# Patient Record
Sex: MALE | Race: WHITE | Employment: FULL TIME | URBAN - METROPOLITAN AREA
[De-identification: names, ages, dates, MRNs, and addresses within clinical notes are randomized per-mention and may not be internally consistent; named-entity substitution may affect disease eponyms.]

---

## 2020-06-20 ENCOUNTER — HOSPITAL ENCOUNTER (EMERGENCY)
Age: 45
Discharge: HOME | End: 2020-06-20
Attending: EMERGENCY MEDICINE
Payer: MEDICARE

## 2020-06-20 VITALS
TEMPERATURE: 98.5 F | BODY MASS INDEX: 32.07 KG/M2 | HEART RATE: 92 BPM | SYSTOLIC BLOOD PRESSURE: 129 MMHG | HEIGHT: 73 IN | DIASTOLIC BLOOD PRESSURE: 87 MMHG | RESPIRATION RATE: 17 BRPM | WEIGHT: 242 LBS | OXYGEN SATURATION: 92 %

## 2020-06-20 LAB
AMORPHOUS: ABNORMAL /HPF
ANION GAP SERPL CALCULATED.3IONS-SCNC: 15 MMOL/L (ref 3–16)
BACTERIA: ABNORMAL /HPF
BASOPHILS ABSOLUTE: 0 K/UL (ref 0–0.2)
BASOPHILS RELATIVE PERCENT: 0.3 %
BILIRUBIN URINE: NEGATIVE
BLOOD, URINE: ABNORMAL
BUN BLDV-MCNC: 16 MG/DL (ref 7–20)
CALCIUM SERPL-MCNC: 10.3 MG/DL (ref 8.3–10.6)
CHLORIDE BLD-SCNC: 98 MMOL/L (ref 99–110)
CLARITY: CLEAR
CO2: 22 MMOL/L (ref 21–32)
COLOR: YELLOW
CREAT SERPL-MCNC: 1 MG/DL (ref 0.9–1.3)
EKG ATRIAL RATE: 132 BPM
EKG DIAGNOSIS: NORMAL
EKG P AXIS: 9 DEGREES
EKG P-R INTERVAL: 156 MS
EKG Q-T INTERVAL: 294 MS
EKG QRS DURATION: 92 MS
EKG QTC CALCULATION (BAZETT): 435 MS
EKG R AXIS: 16 DEGREES
EKG T AXIS: 57 DEGREES
EKG VENTRICULAR RATE: 132 BPM
EOSINOPHILS ABSOLUTE: 0 K/UL (ref 0–0.6)
EOSINOPHILS RELATIVE PERCENT: 0.4 %
EPITHELIAL CELLS, UA: ABNORMAL /HPF (ref 0–5)
ETHANOL: NORMAL MG/DL (ref 0–0.08)
GFR AFRICAN AMERICAN: >60
GFR NON-AFRICAN AMERICAN: >60
GLUCOSE BLD-MCNC: 125 MG/DL (ref 70–99)
GLUCOSE URINE: NEGATIVE MG/DL
HCT VFR BLD CALC: 42.9 % (ref 40.5–52.5)
HEMOGLOBIN: 14.7 G/DL (ref 13.5–17.5)
KETONES, URINE: NEGATIVE MG/DL
LEUKOCYTE ESTERASE, URINE: NEGATIVE
LYMPHOCYTES ABSOLUTE: 1.1 K/UL (ref 1–5.1)
LYMPHOCYTES RELATIVE PERCENT: 11 %
MCH RBC QN AUTO: 31 PG (ref 26–34)
MCHC RBC AUTO-ENTMCNC: 34.2 G/DL (ref 31–36)
MCV RBC AUTO: 90.4 FL (ref 80–100)
MICROSCOPIC EXAMINATION: YES
MONOCYTES ABSOLUTE: 0.7 K/UL (ref 0–1.3)
MONOCYTES RELATIVE PERCENT: 7 %
MUCUS: ABNORMAL /LPF
NEUTROPHILS ABSOLUTE: 8.2 K/UL (ref 1.7–7.7)
NEUTROPHILS RELATIVE PERCENT: 81.3 %
NITRITE, URINE: NEGATIVE
PDW BLD-RTO: 13.2 % (ref 12.4–15.4)
PH UA: 6 (ref 5–8)
PLATELET # BLD: 266 K/UL (ref 135–450)
PMV BLD AUTO: 8.7 FL (ref 5–10.5)
POTASSIUM REFLEX MAGNESIUM: 4.5 MMOL/L (ref 3.5–5.1)
PROTEIN UA: 100 MG/DL
RBC # BLD: 4.75 M/UL (ref 4.2–5.9)
RBC UA: ABNORMAL /HPF (ref 0–4)
SODIUM BLD-SCNC: 135 MMOL/L (ref 136–145)
SPECIFIC GRAVITY UA: >=1.03 (ref 1–1.03)
URINE TYPE: ABNORMAL
UROBILINOGEN, URINE: 0.2 E.U./DL
WBC # BLD: 10.1 K/UL (ref 4–11)
WBC UA: ABNORMAL /HPF (ref 0–5)

## 2020-06-20 PROCEDURE — 93005 ELECTROCARDIOGRAM TRACING: CPT | Performed by: PHYSICIAN ASSISTANT

## 2020-06-20 PROCEDURE — G0480 DRUG TEST DEF 1-7 CLASSES: HCPCS

## 2020-06-20 PROCEDURE — 99284 EMERGENCY DEPT VISIT MOD MDM: CPT

## 2020-06-20 PROCEDURE — 85025 COMPLETE CBC W/AUTO DIFF WBC: CPT

## 2020-06-20 PROCEDURE — 80048 BASIC METABOLIC PNL TOTAL CA: CPT

## 2020-06-20 PROCEDURE — 6370000000 HC RX 637 (ALT 250 FOR IP): Performed by: PHYSICIAN ASSISTANT

## 2020-06-20 PROCEDURE — 81001 URINALYSIS AUTO W/SCOPE: CPT

## 2020-06-20 PROCEDURE — 2580000003 HC RX 258: Performed by: PHYSICIAN ASSISTANT

## 2020-06-20 RX ORDER — 0.9 % SODIUM CHLORIDE 0.9 %
1000 INTRAVENOUS SOLUTION INTRAVENOUS ONCE
Status: COMPLETED | OUTPATIENT
Start: 2020-06-20 | End: 2020-06-20

## 2020-06-20 RX ORDER — LEVETIRACETAM 500 MG/1
500 TABLET ORAL 2 TIMES DAILY
Qty: 60 TABLET | Refills: 0 | Status: SHIPPED | OUTPATIENT
Start: 2020-06-20 | End: 2022-02-06 | Stop reason: ALTCHOICE

## 2020-06-20 RX ORDER — LEVETIRACETAM 250 MG/1
1000 TABLET ORAL ONCE
Status: COMPLETED | OUTPATIENT
Start: 2020-06-20 | End: 2020-06-20

## 2020-06-20 RX ADMIN — LEVETIRACETAM 1000 MG: 250 TABLET ORAL at 10:46

## 2020-06-20 RX ADMIN — SODIUM CHLORIDE 1000 ML: 9 INJECTION, SOLUTION INTRAVENOUS at 10:47

## 2020-06-20 ASSESSMENT — ENCOUNTER SYMPTOMS
VOMITING: 0
DIARRHEA: 0
SHORTNESS OF BREATH: 0
ABDOMINAL PAIN: 0
GASTROINTESTINAL NEGATIVE: 1
RESPIRATORY NEGATIVE: 1

## 2020-06-20 NOTE — ED PROVIDER NOTES
[address] [facility]          PHYSICIAN ASSISTANT NOTE       Date of evaluation: 6/20/2020    Chief Complaint     Seizures      History of Present Illness     Monika Patel is a 54 y.o. male who presents seizure. Patient arrives via EMS for a reported seizure at work. Patient reports he does have a history of seizures and ran out of his keppra last week because he missed his appointment. EMS reports patient was post-ictal at the scene. Patient complains of a mild headache which is typical for him after a seizure. Patient denies any other pain. No recent illness, fever, cough, shortness of breath, vomiting, diarrhea. Otherwise well. Review of Systems     Review of Systems   Constitutional: Negative. Negative for fever. Respiratory: Negative. Negative for shortness of breath. Cardiovascular: Negative. Negative for chest pain. Gastrointestinal: Negative. Negative for abdominal pain, diarrhea and vomiting. Musculoskeletal: Negative. Negative for myalgias, neck pain and neck stiffness. Skin: Negative. Neurological: Positive for seizures. Negative for facial asymmetry, speech difficulty and weakness. All other systems reviewed and are negative. Past Medical, Surgical, Family, and Social History     He has a past medical history of Hypertension, Obesity, and Seizures ([facility] [address] He has a past surgical history that includes fracture surgery. His family history includes Other in his mother; Stroke in his father. He reports that he has quit smoking. He has never used smokeless tobacco. He reports current alcohol use. He reports that he does not use drugs.     Medications     Previous Medications    ATORVASTATIN (LIPITOR) 10 MG TABLET    Po daily    CHLORTHALIDONE (HYGROTEN) 50 MG TABLET    Po daily    LISINOPRIL (PRINIVIL;ZESTRIL) 40 MG TABLET    TAKE 1 TABLET BY MOUTH EVERY DAY    LORAZEPAM (ATIVAN) 0.5 MG TABLET    Take 1 tablet by mouth every 8 hours as needed during the hospital encounter of 06/20/20   CBC Auto Differential   Result Value Ref Range    WBC 10.1 4.0 - 11.0 K/uL    RBC 4.75 4.20 - 5.90 M/uL    Hemoglobin 14.7 13.5 - 17.5 g/dL    Hematocrit 42.9 40.5 - 52.5 %    MCV 90.4 80.0 - 100.0 fL    MCH 31.0 26.0 - 34.0 pg    MCHC 34.2 31.0 - 36.0 g/dL    RDW 13.2 12.4 - 15.4 %    Platelets 429 134 - 990 K/uL    MPV 8.7 5.0 - 10.5 fL    Neutrophils % 81.3 %    Lymphocytes % 11.0 %    Monocytes % 7.0 %    Eosinophils % 0.4 %    Basophils % 0.3 %    Neutrophils Absolute 8.2 (H) 1.7 - 7.7 K/uL    Lymphocytes Absolute 1.1 1.0 - 5.1 K/uL    Monocytes Absolute 0.7 0.0 - 1.3 K/uL    Eosinophils Absolute 0.0 0.0 - 0.6 K/uL    Basophils Absolute 0.0 0.0 - 0.2 K/uL   Basic Metabolic Panel w/ Reflex to MG   Result Value Ref Range    Sodium 135 (L) 136 - 145 mmol/L    Potassium reflex Magnesium 4.5 3.5 - 5.1 mmol/L    Chloride 98 (L) 99 - 110 mmol/L    CO2 22 21 - 32 mmol/L    Anion Gap 15 3 - 16    Glucose 125 (H) 70 - 99 mg/dL    BUN 16 7 - 20 mg/dL    CREATININE 1.0 0.9 - 1.3 mg/dL    GFR Non-African American >60 >60    GFR African American >60 >60    Calcium 10.3 8.3 - 10.6 mg/dL   Urinalysis, reflex to microscopic   Result Value Ref Range    Color, UA Yellow Straw/Yellow    Clarity, UA Clear Clear    Glucose, Ur Negative Negative mg/dL    Bilirubin Urine Negative Negative    Ketones, Urine Negative Negative mg/dL    Specific Gravity, UA >=1.030 1.005 - 1.030    Blood, Urine TRACE-INTACT (A) Negative    pH, UA 6.0 5.0 - 8.0    Protein,  (A) Negative mg/dL    Urobilinogen, Urine 0.2 <2.0 E.U./dL    Nitrite, Urine Negative Negative    Leukocyte Esterase, Urine Negative Negative    Microscopic Examination YES     Urine Type NotGiven    Ethanol   Result Value Ref Range    Ethanol Lvl None Detected mg/dL   Microscopic Urinalysis   Result Value Ref Range    Mucus, UA Rare (A) None Seen /LPF    WBC, UA 0-2 0 - 5 /HPF    RBC, UA 0-2 0 - 4 /HPF    Epithelial Cells, UA 0-1 0 - 5 /HPF    Bacteria, UA Rare (A) None Seen /HPF    Amorphous, UA 1+ /HPF   EKG 12 Lead   Result Value Ref Range    Ventricular Rate 132 BPM    Atrial Rate 132 BPM    P-R Interval 156 ms    QRS Duration 92 ms    Q-T Interval 294 ms    QTc Calculation (Bazett) 435 ms    P Axis 9 degrees    R Axis 16 degrees    T Axis 57 degrees    Diagnosis       EKG performed in ER and to be interpreted by ER physician. Confirmed by MD, ER ([id number]),  Jose Miguel Guzman ([id number]) on 6/20/2020 10:58:23 AM       ED BEDSIDE ULTRASOUND:      RECENT VITALS:  BP: 129/87, Temp: 98.5 °F (36.9 °C), Pulse: 92, Resp: 17, SpO2: 92 %     Procedures         ED Course     Nursing Notes, Past Medical Hx,Past Surgical Hx, Social Hx, Allergies, and Family Hx were reviewed. The patient was given the following medications:  Orders Placed This Encounter   Medications    0.9 % sodium chloride bolus    levETIRAcetam (KEPPRA) tablet 1,000 mg    levETIRAcetam (KEPPRA) 500 MG tablet     Sig: Take 1 tablet by mouth 2 times daily     Dispense:  60 tablet     Refill:  0       CONSULTS:  None    MEDICAL DECISION MAKING / ASSESSMENT / Will Alexandra is a 54 y.o. male with seizure. Patient is well appearing and in no acute distress. Patient is tachycardia and hypertensive on arrival. Patient is alert and oriented with a non-focal neurologic exam. No meningismus. No recent illness. Seizure likely due to medication non-compliance. Patient given a loading dose of keppra in the ER. Patient is mildly hyponatremia, given fluids in the ER. Blood work otherwise reassuring. Urinalysis without infection. Patient's heart rate improved to 92 in the ER. Patient is stable, tolerating oral intake, ambulatory. Will discharge with keppra prescription. Follow-up Neurology. Return precautions discussed. This patient was also evaluated by the attending physician. All care plans were discussed and agreed upon. Clinical Impression     1.  Seizure ([address]        Disposition PATIENT REFERRED TO:  Lily Wong MD  [facility] [address]  [phone]    Schedule an appointment as soon as possible for a visit       [facility] Emergency Department  [address] [place] [address]  [phone]    As needed, If symptoms worsen      DISCHARGE MEDICATIONS:  New Prescriptions    LEVETIRACETAM (KEPPRA) 500 MG TABLET    Take 1 tablet by mouth 2 times daily       DISPOSITION Decision To Discharge 06/20/2020 11:34:53 AM        Fabian Nava PA-C  06/20/20 2056

## 2020-06-20 NOTE — ED NOTES
Discharge order received. Patient being DC home with family. All paperwork explained to patient. He verbalizes understanding and denies any questions. IV removed. All belongings sent with patient.       Cassandra Odonnell RN  06/20/20 1666

## 2020-06-20 NOTE — ED TRIAGE NOTES
Pt states that he missed a doctor appointment last week and ran out of his keppra yesterday. Takes Keppra 500 mg BID. Pt was at work and had a seizure, fell from standing position. C/o head pain. EMS states that he was post ictal on arrival, is now A&O x4    Seizure precautions in place. Side rails padded on both sides of stretcher. Oxygen and suction available at head of bed. Will continue seizure precautions for patient safety.

## 2020-07-12 ENCOUNTER — HOSPITAL ENCOUNTER (EMERGENCY)
Age: 45
Discharge: HOME | End: 2020-07-12
Attending: STUDENT IN AN ORGANIZED HEALTH CARE EDUCATION/TRAINING PROGRAM
Payer: MEDICARE

## 2020-07-12 ENCOUNTER — APPOINTMENT (OUTPATIENT)
Dept: GENERAL RADIOLOGY | Age: 45
End: 2020-07-12
Payer: MEDICARE

## 2020-07-12 VITALS
TEMPERATURE: 99.5 F | HEART RATE: 86 BPM | RESPIRATION RATE: 14 BRPM | OXYGEN SATURATION: 96 % | DIASTOLIC BLOOD PRESSURE: 94 MMHG | SYSTOLIC BLOOD PRESSURE: 148 MMHG

## 2020-07-12 PROCEDURE — 99282 EMERGENCY DEPT VISIT SF MDM: CPT

## 2020-07-12 PROCEDURE — 73130 X-RAY EXAM OF HAND: CPT

## 2020-07-12 PROCEDURE — 12002 RPR S/N/AX/GEN/TRNK2.6-7.5CM: CPT

## 2020-07-12 ASSESSMENT — PAIN SCALES - GENERAL: PAINLEVEL_OUTOF10: 9

## 2020-07-12 ASSESSMENT — PAIN DESCRIPTION - ORIENTATION: ORIENTATION: RIGHT

## 2020-07-12 ASSESSMENT — PAIN DESCRIPTION - PROGRESSION: CLINICAL_PROGRESSION: GRADUALLY WORSENING

## 2020-07-12 ASSESSMENT — PAIN DESCRIPTION - ONSET: ONSET: SUDDEN

## 2020-07-12 ASSESSMENT — PAIN DESCRIPTION - FREQUENCY: FREQUENCY: CONTINUOUS

## 2020-07-12 ASSESSMENT — PAIN DESCRIPTION - PAIN TYPE: TYPE: ACUTE PAIN

## 2020-07-12 ASSESSMENT — PAIN DESCRIPTION - LOCATION: LOCATION: HAND

## 2020-07-12 ASSESSMENT — PAIN DESCRIPTION - DESCRIPTORS: DESCRIPTORS: SORE

## 2020-07-12 NOTE — ED TRIAGE NOTES
Laceration to right middle knuckle he was reaching into his  yesterday and cut his knuckle, bleeding is controlled however when he bends is knuckle is opens the wound back up

## 2020-07-12 NOTE — ED NOTES
Pt d/c home with avs no s/s of distress noted he denies questions      Dennise Coello, RN  07/12/20 2858

## 2020-07-12 NOTE — ED PROVIDER NOTES
need for additional repair and nerve damage    Alternatives discussed:  No treatment  Anesthesia (see MAR for exact dosages): Anesthesia method:  Local infiltration    Local anesthetic:  Lidocaine 1% WITH epi  Laceration details:     Location:  Hand    Hand location:  R hand, dorsum    Wound length (cm): 4cm. Laceration depth: 1cm. Repair type:     Repair type:  Simple  Pre-procedure details:     Preparation:  Patient was prepped and draped in usual sterile fashion  Exploration:     Hemostasis achieved with:  Epinephrine  Treatment:     Area cleansed with:  Saline    Amount of cleaning:  Standard    Irrigation solution:  Sterile saline    Visualized foreign bodies/material removed: no    Skin repair:     Repair method:  Sutures    Suture size:  4-0    Suture material:  Prolene    Suture technique:  Simple interrupted  Approximation:     Approximation:  Close  Post-procedure details:     Dressing:  Antibiotic ointment and adhesive bandage    Patient tolerance of procedure: Tolerated well, no immediate complications        ASSESSMENT AND PLAN:  Yovana Zuniga is a 54 y.o. male presents this afternoon accompanied by wife with a head lac. On exam nontoxic in no acute distress and lack was repaired using 4-0 Prolene with 7 sutures. States that he is up-to-date with his tetanus shots and no indication for tetanus. He was discharged home to follow-up with PCP or return to the emergency room in 10 to 14 days for suture removal.     ClINICAL IMPRESSION:  1.  Laceration of right hand without foreign body, initial encounter          PATIENT REFERRED TO:  Annelise Roger MD  [facility] [address]  [phone]    Schedule an appointment as soon as possible for a visit in 2 days        DISCHARGE MEDICATIONS:  Discharge Medication List as of 7/12/2020  1:14 PM        DISCONTINUED MEDICATIONS:  Discharge Medication List as of 7/12/2020  1:14 PM        DISPOSITION Decision To Discharge 07/12/2020 01:12:51 PM  -We have instructed the patient, Sandeep Helton) to return to the ED or call him PCP if him pain/symptoms worsen. -Findings and recommendations explained to patient. He expressed understanding and agreed with the plan. Adenike Navarro MD (electronically signed)  7/13/2020  _________________________________________________________________________________________  _________________________________________________________________________________________  This record is transcribed utilizing voice recognition technology. There are inherent limitations in this technology. In addition, there may be limitations in editing of this report. If there are any discrepancies, please contact me directly.         Adenike Navarro MD  07/13/20 0710

## 2020-07-19 ENCOUNTER — HOSPITAL ENCOUNTER (EMERGENCY)
Age: 45
Discharge: HOME | End: 2020-07-19
Attending: EMERGENCY MEDICINE
Payer: MEDICARE

## 2020-07-19 PROCEDURE — 99281 EMR DPT VST MAYX REQ PHY/QHP: CPT

## 2020-07-19 RX ORDER — CEPHALEXIN 500 MG/1
500 CAPSULE ORAL 4 TIMES DAILY
Qty: 40 CAPSULE | Refills: 0 | Status: SHIPPED | OUTPATIENT
Start: 2020-07-19 | End: 2020-07-29

## 2020-07-19 ASSESSMENT — PAIN SCALES - GENERAL: PAINLEVEL_OUTOF10: 6

## 2020-07-19 ASSESSMENT — PAIN DESCRIPTION - DESCRIPTORS: DESCRIPTORS: SORE

## 2020-07-19 ASSESSMENT — PAIN DESCRIPTION - PAIN TYPE: TYPE: ACUTE PAIN

## 2020-07-19 ASSESSMENT — PAIN DESCRIPTION - LOCATION: LOCATION: HAND

## 2020-07-19 ASSESSMENT — PAIN DESCRIPTION - ORIENTATION: ORIENTATION: RIGHT

## 2020-07-19 NOTE — ED NOTES
Pt wearing mask from home. Staff wearing procedural mask and eye protection (helmet or goggles) for staff protection-COVID 19      pt seen here last sunday and received sutures top of right hand (hand cut on edge of ). developed redness inpast 2 days with some clear drainage today. laceration fairly well approximated but has some yellowish-white tissue at laceration line. pain at 6.  no OTC pain meds taken. pt concerned that it may be infected.          Daryl Hdz, KATIE  07/19/20 1809

## 2020-07-19 NOTE — ED PROVIDER NOTES
eMERGENCY dEPARTMENT eNCOUnter      Pt Name: Neftali Shrestha  MRN: [id number]  [place] 1975  Date of evaluation: 7/19/2020  Provider: Kevin Peña MD     [address] [place] [address]       Chief Complaint   Patient presents with    Suture / Staple Removal     pt seen here last sunday and received sutures top of right hand (hand cut on edge of ). developed redness inpast 2 days with some clear drainage today. laceration fairly well approximated but has some yellowish-white tissue at laceration line. pain at 6.  no OTC pain meds taken. pt concerned that it may be infected.  Wound Infection         HISTORY OF PRESENT ILLNESS   (Location/Symptom, Timing/Onset,Context/Setting, Quality, Duration, Modifying Factors, Severity) Note limiting factors. HPI    Neftali Shrestha is a 54 y.o. male who presents to the emergency department for suture removal.  Patient was seen a week ago for laceration of the right hand specifically the knuckle at the MCP joint third finger. Patient received 6 sutures. Patient now thinks is infected is red warm to touch. Swollen. Patient's sutures has been there for at least 7 days. There is no fevers no chills    Nursing Notes were reviewed. REVIEW OFSYSTEMS    (2+ for level 4; 10+ for level 5)   Review of Systems    General: No fevers, chills or night sweats, No weight loss    Head:  No Sore throat,  No Ear Pain    Chest:  Nontender. No Cough, No SOB,  Chest Pain    GI: No abdominal pain or vomiting    : No dysuria or hematuria    Musculoskeletal: No unrelenting pain or night pain    Neurologic: No bowel or bladder incontinence, No saddle anesthesia, No leg weakness    All other systems reviewed and are negative.         PAST MEDICAL HISTORY     Past Medical History:   Diagnosis Date    Hyperlipidemia     Hypertension     Obesity     Seizures ([facility] [address]        SURGICAL HISTORY       Past Surgical History:   Procedure Laterality Date    FRACTURE SURGERY         CURRENT MEDICATIONS       Previous Medications    ATORVASTATIN (LIPITOR) 10 MG TABLET    Po daily    LEVETIRACETAM (KEPPRA) 500 MG TABLET    Take 1 tablet by mouth 2 times daily    LISINOPRIL (PRINIVIL;ZESTRIL) 40 MG TABLET    TAKE 1 TABLET BY MOUTH EVERY DAY    MULTIVITAMIN (THERAGRAN) PER TABLET    Take 1 tablet by mouth daily. ALLERGIES     Patient has no known allergies.     FAMILY HISTORY       Family History   Problem Relation Age of Onset    Other Mother     Stroke Father         SOCIAL HISTORY       Social History     Socioeconomic History    Marital status:      Spouse name: None    Number of children: None    Years of education: None    Highest education level: None   Occupational History    None   Social Needs    Financial resource strain: None    Food insecurity     Worry: None     Inability: None    Transportation needs     Medical: None     Non-medical: None   Tobacco Use    Smoking status: Former Smoker    Smokeless tobacco: Never Used   Substance and Sexual Activity    Alcohol use: Yes     Comment: 4 times a week    Drug use: No    Sexual activity: Not Currently     Partners: Female   Lifestyle    Physical activity     Days per week: None     Minutes per session: None    Stress: None   Relationships    Social connections     Talks on phone: None     Gets together: None     Attends [employer] service: None     Active member of club or organization: None     Attends meetings of clubs or organizations: None     Relationship status: None    Intimate partner violence     Fear of current or ex partner: None     Emotionally abused: None     Physically abused: None     Forced sexual activity: None   Other Topics Concern    None   Social History Narrative    None       SCREENINGS           PHYSICAL EXAM    (up to 7 for level 4, 8 or more for level 5)     ED Triage Vitals [07/19/20 1419]   BP Temp Temp Source Pulse Resp SpO2 Height Weight   128/83 98.1 °F (36.7 °C) Oral 100 20 95 % 6' 1\" (1.854 m) 241 lb 10 oz (109.6 kg)       Physical Exam    General: Alert and awake ×3. Nontoxic appearance. Well-developed well-nourished 58-year-old white male in no distress  HEENT: Normocephalic atraumatic. Neck is supple. Airway intact. No adenopathy  Cardiac: Regular rate and rhythm with no murmurs rubs or gallops  Pulmonary: Lungs are clear in all lung fields. No wheezing. No Rales. Abdomen: Soft and nontender. Negative hepatosplenomegaly. Bowel sounds are active  Extremities: Moving all extremities. No calf tenderness. Peripheral pulses all intact. Patient right hand has 6 sutures well-healed with moderate swelling and redness. That is consistent for a cellulitis his hands warm with a little rash. Sutures will need to be removed. There is no drainage at this time. Flexion extension is fine but moderate swelling over the joint skin: No skin lesions. No rashes  Neurologic: Cranial nerves II through XII was grossly intact. Nonfocal neurological exam  Psychiatric: Patient is pleasant. Mood is appropriate. DIAGNOSTIC RESULTS     EKG (Per Emergency Physician):       RADIOLOGY (Per Emergency Physician): Interpretation per the Radiologist below, if available at the time of this note:  No results found. ED BEDSIDE ULTRASOUND:   Performed by ED Physician - none    LABS:  Labs Reviewed - No data to display     All other labs were within normal range or not returned as of this dictation. Procedures    Suture removal without any difficulty. A total of 6 was removed. There is still some redness. No pus noted. EMERGENCY DEPARTMENT COURSE and DIFFERENTIAL DIAGNOSIS/MDM:   Vitals:    Vitals:    07/19/20 1419   BP: 128/83   Pulse: 100   Resp: 20   Temp: 98.1 °F (36.7 °C)   TempSrc: Oral   SpO2: 95%   Weight: 241 lb 10 oz (109.6 kg)   Height: 6' 1\" (1.854 m)       Medications - No data to display    MDM.     This is a 58-year-old sustained a laceration of the right hand 7 days ago requiring sutures. Patient is here for suture removal.  It was removed no foreign body noted there is redness. Suspect some infection placed on Keflex patient will follow-up with family physician discharged in good improved condition  REVAL:         CRITICAL CARE TIME   Total CriticalCare time was 0 minutes, excluding separately reportable procedures. There was a high probability of clinically significant/life threatening deterioration in the patient's condition which required my urgent intervention. CONSULTS:  None    PROCEDURES:  Unless otherwise noted below, none     [unfilled]    FINAL IMPRESSION      1. Encounter for removal of sutures    2. Cellulitis of finger of right hand          DISPOSITION/PLAN   DISPOSITION Decision To Discharge 07/19/2020 02:39:01 PM      PATIENT REFERRED TO:  Lori Marks MD  [facility] [address]  [phone]    Schedule an appointment as soon as possible for a visit in 1 week  As needed      DISCHARGE MEDICATIONS:  New Prescriptions    CEPHALEXIN (KEFLEX) 500 MG CAPSULE    Take 1 capsule by mouth 4 times daily for 10 days          (Please note:  Portions of this note were completed with a voice recognition program.Efforts were made to edit the dictations but occasionally words and phrases are mis-transcribed.)  Form v2016. J.[id number]    DEMETRA PETTY MD (electronically signed)  Emergency Medicine Provider        Darryn Fields MD  07/19/20 7974

## 2020-07-23 VITALS
HEIGHT: 73 IN | SYSTOLIC BLOOD PRESSURE: 119 MMHG | BODY MASS INDEX: 32.02 KG/M2 | TEMPERATURE: 98.1 F | RESPIRATION RATE: 20 BRPM | WEIGHT: 241.62 LBS | HEART RATE: 88 BPM | OXYGEN SATURATION: 95 % | DIASTOLIC BLOOD PRESSURE: 82 MMHG

## 2020-07-24 NOTE — ED NOTES
EMD removed sutures. EMD states pt got a little diaphoretic when sutures removed. No steri strips to lac line at EMD's request.    Home wound care teaching with pt. Pt no longer diaphoretic. Denies dizziness.    VSS   Pain to area at [address] [place] [address]  07/23/20 8298

## 2020-07-24 NOTE — ED NOTES
Discharge instructions with pt. Home wound care teaching again with pt. Explained rx. Pain to area at 6.    Home ambulatory     Bucky Fitzgerald RN  07/23/20 9445

## 2020-11-03 ENCOUNTER — HOSPITAL ENCOUNTER (EMERGENCY)
Age: 45
Discharge: HOME | End: 2020-11-03
Payer: MEDICARE

## 2020-11-03 VITALS
SYSTOLIC BLOOD PRESSURE: 140 MMHG | TEMPERATURE: 97.4 F | RESPIRATION RATE: 16 BRPM | DIASTOLIC BLOOD PRESSURE: 91 MMHG | OXYGEN SATURATION: 96 % | HEIGHT: 73 IN | HEART RATE: 95 BPM | BODY MASS INDEX: 31.96 KG/M2 | WEIGHT: 241.18 LBS

## 2020-11-03 PROCEDURE — 6370000000 HC RX 637 (ALT 250 FOR IP): Performed by: NURSE PRACTITIONER

## 2020-11-03 PROCEDURE — 99282 EMERGENCY DEPT VISIT SF MDM: CPT

## 2020-11-03 PROCEDURE — 16000 INITIAL TREATMENT OF BURN(S): CPT

## 2020-11-03 RX ORDER — CLINDAMYCIN HYDROCHLORIDE 150 MG/1
450 CAPSULE ORAL ONCE
Status: COMPLETED | OUTPATIENT
Start: 2020-11-03 | End: 2020-11-03

## 2020-11-03 RX ORDER — CLINDAMYCIN HYDROCHLORIDE 150 MG/1
450 CAPSULE ORAL 3 TIMES DAILY
Qty: 90 CAPSULE | Refills: 0 | Status: SHIPPED | OUTPATIENT
Start: 2020-11-03 | End: 2020-11-13

## 2020-11-03 RX ORDER — MUPIROCIN CALCIUM 20 MG/G
CREAM TOPICAL
Qty: 2 TUBE | Refills: 0 | Status: SHIPPED | OUTPATIENT
Start: 2020-11-03 | End: 2020-12-03

## 2020-11-03 RX ORDER — CLINDAMYCIN HYDROCHLORIDE 150 MG/1
300 CAPSULE ORAL ONCE
Status: DISCONTINUED | OUTPATIENT
Start: 2020-11-03 | End: 2020-11-03

## 2020-11-03 RX ORDER — BACITRACIN ZINC AND POLYMYXIN B SULFATE 500; 1000 [USP'U]/G; [USP'U]/G
OINTMENT TOPICAL 2 TIMES DAILY
Status: DISCONTINUED | OUTPATIENT
Start: 2020-11-03 | End: 2020-11-04 | Stop reason: HOSPADM

## 2020-11-03 RX ADMIN — FIRST AID ANTIBIOTIC OINTMENT: 500; 10000 OINTMENT TOPICAL at 21:13

## 2020-11-03 RX ADMIN — CLINDAMYCIN HYDROCHLORIDE 450 MG: 150 CAPSULE ORAL at 20:55

## 2020-11-04 NOTE — ED PROVIDER NOTES
[address] [address] [address] [facility] [address]  Dept: [phone]  Loc: [address] ENCOUNTER        This patient was not seen or evaluated by the attending physician. I evaluated this patient, the attending physician was available for consultation. CHIEF COMPLAINT    Chief Complaint   Patient presents with    Burn     Pt burned hand with grease on Sat. wears gloves for work which aggravated the area       HPI    Radha Tom is a 54 y.o. male who presents with a skin burn. Onset was days ago. The duration has been constant since the onset. The location is the dorsal aspect of the right hand. The patient has associated pain, the severity of which is 8/10. The context is he was cooking fish and all of oil, slipped the fish and some the all of oil splattered up onto his right hand. No palmar burns. He states that he is up-to-date on his tetanus within the past 10 years. Did not go to be seen or evaluated after the burn occurred. States that he works at [employer], and has to wear gloves at work, noticed that the irritation and redness increased after wearing his gloves over the past 2 days. Therefore came to the ED for further evaluation and treatment. No fevers or chills noted. Lucky Bound REVIEW OF SYSTEMS    Pulmonary: No difficulty breathing or chest tightness  Skin: see HPI  ENT: No throat swelling or tongue swelling  General: No fevers or syncope  Immunization: Tetanus status is up-to-date according to the patient, verified via care everywhere. Last tetanus was 2013. Does not need to be updated during this ED encounter. All other systems reviewed and are negative.     PAST MEDICAL & SURGICAL HISTORY    Past Medical History:   Diagnosis Date    Hyperlipidemia     Hypertension     Obesity     Seizures ([facility] [address]      Past Surgical History:   Procedure Laterality Date    FRACTURE SURGERY         CURRENT on file     Emotionally abused: Not on file     Physically abused: Not on file     Forced sexual activity: Not on file   Other Topics Concern    Not on file   Social History Narrative    Not on file     Family History   Problem Relation Age of Onset    Other Mother     Stroke Father        PHYSICAL EXAM    VITAL SIGNS: BP (!) 140/91   Pulse 95   Temp 97.4 °F (36.3 °C) (Oral)   Resp 16   Ht 6' 1\" (1.854 m)   Wt 241 lb 2.9 oz (109.4 kg)   SpO2 96%   BMI 31.82 kg/m²   Constitutional:  Well developed, well nourished, no acute distress   HENT:  Atraumatic, no facial or lip swelling  ORAL EXAM:  No tongue swelling, airway patent  NECK:  Supple, No neck swelling  Respiratory:  No respiratory distress, normal breath sounds   Cardiovascular:  regular rate and rhythm, no JVD. Right radial pulse 2+. Capillary refill less than 3 seconds. GI: nondistended, normal bowel sounds, nontender, no organomegaly   Musculoskeletal:  No edema, no acute deformities. Integument:  + Several areas, the largest of which is measuring 2 cm in diameter area of superficial desquamation on the dorsal aspect of the right hand, no weeping, erythema is surrounding these areas of desquamation, erythema is noted to be extending up to the right wrist region consistent with some possible early development of cellulitis clinically  Neurologic: Motor and sensory is intact and normal, patient is awake, alert, no slurred speech    RADIOLOGY   No orders to display       ED [address]    Pertinent Labs reviewed and interpreted. (See chart for details)  See chart for details of medications given during the ED stay. Differential diagnosis: partial thickness burn, full thickness burn, sepsis, compartment syndrome, cellulitis, other    Patient is afebrile and nontoxic in appearance.  Physical exam consistent with second-degree partial-thickness burn intermixed with some areas of first-degree burn on the dorsal aspect of the right hand. Burn cleansed thoroughly and antibiotic ointment and sterile gauze were applied. Given that the area of erythema has extended up into the right wrist region I will treat him with a course of clindamycin for concomitant cellulitis. He does have a primary care provider listed, he needs to follow-up with them in the next 48 hours for wound recheck. He is to return immediately for any new or worsening symptoms. He verbalized understanding, has no further questions or concerns. Remained afebrile and hemodynamically stable throughout his entire ED course and will be discharged home in stable condition. I estimate there is LOW risk for COMPARTMENT SYNDROME, NECROTIZING FASCIITIS, TENDON OR NEUROVASCULAR INJURY, or FOREIGN BODY, thus I consider the discharge disposition reasonable. Also, there is no evidence or peritonitis, sepsis, or toxicity. Xenia Mo and I have discussed the diagnosis and risks, and we agree with discharging home to follow-up with their primary doctor. We also discussed returning to the Emergency Department immediately if new or worsening symptoms occur. We have discussed the symptoms which are most concerning (e.g., changing or worsening pain, fever, numbness, weakness, cool or painful digits) that necessitate immediate return. Discharge Vital Signs:  Blood pressure (!) 140/91, pulse 95, temperature 97.4 °F (36.3 °C), temperature source Oral, resp. rate 16, height 6' 1\" (1.854 m), weight 241 lb 2.9 oz (109.4 kg), SpO2 96 %. The patient was instructed to follow up as an outpatient in 2 days for wound recheck. The patient was instructed to return to the ED immediately for any new or worsening symptoms. The patient verbalized understanding. FINAL IMPRESSION    1.  Burn of back of right hand, initial encounter        PLAN  Discharge with outpatient follow-up      (Please note that this note was completed with a voice recognition program.  Every attempt was made to edit the dictations, but inevitably there remain words that are mis-transcribed.)          Chetan Monterroso, SOSA - PATEL  11/03/20 2026

## 2022-02-06 ENCOUNTER — APPOINTMENT (OUTPATIENT)
Dept: GENERAL RADIOLOGY | Age: 47
End: 2022-02-06
Payer: COMMERCIAL

## 2022-02-06 ENCOUNTER — HOSPITAL ENCOUNTER (EMERGENCY)
Age: 47
Discharge: HOME | End: 2022-02-06
Payer: COMMERCIAL

## 2022-02-06 VITALS
RESPIRATION RATE: 18 BRPM | WEIGHT: 261.69 LBS | HEART RATE: 91 BPM | HEIGHT: 73 IN | OXYGEN SATURATION: 95 % | DIASTOLIC BLOOD PRESSURE: 90 MMHG | TEMPERATURE: 98.7 F | BODY MASS INDEX: 34.68 KG/M2 | SYSTOLIC BLOOD PRESSURE: 142 MMHG

## 2022-02-06 DIAGNOSIS — S51.012A LACERATION OF LEFT ELBOW, INITIAL ENCOUNTER: Primary | ICD-10-CM

## 2022-02-06 DIAGNOSIS — S60.222A CONTUSION OF LEFT HAND, INITIAL ENCOUNTER: ICD-10-CM
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
## 2022-02-06 PROCEDURE — 6370000000 HC RX 637 (ALT 250 FOR IP): Performed by: PHYSICIAN ASSISTANT

## 2022-02-06 PROCEDURE — 96365 THER/PROPH/DIAG IV INF INIT: CPT

## 2022-02-06 PROCEDURE — 73130 X-RAY EXAM OF HAND: CPT

## 2022-02-06 PROCEDURE — 73080 X-RAY EXAM OF ELBOW: CPT

## 2022-02-06 PROCEDURE — 6360000002 HC RX W HCPCS: Performed by: PHYSICIAN ASSISTANT

## 2022-02-06 PROCEDURE — 2580000003 HC RX 258: Performed by: PHYSICIAN ASSISTANT

## 2022-02-06 PROCEDURE — 12001 RPR S/N/AX/GEN/TRNK 2.5CM/<: CPT

## 2022-02-06 PROCEDURE — 99283 EMERGENCY DEPT VISIT LOW MDM: CPT

## 2022-02-06 RX ORDER — CEPHALEXIN 500 MG/1
500 CAPSULE ORAL 4 TIMES DAILY
Qty: 28 CAPSULE | Refills: 0 | Status: SHIPPED | OUTPATIENT
Start: 2022-02-06 | End: 2022-02-16

## 2022-02-06 RX ORDER — HYDROCODONE BITARTRATE AND ACETAMINOPHEN 5; 325 MG/1; MG/1
1 TABLET ORAL EVERY 8 HOURS PRN
Qty: 9 TABLET | Refills: 0 | Status: SHIPPED | OUTPATIENT
Start: 2022-02-06 | End: 2022-02-09

## 2022-02-06 RX ORDER — HYDROCODONE BITARTRATE AND ACETAMINOPHEN 5; 325 MG/1; MG/1
1 TABLET ORAL ONCE
Status: COMPLETED | OUTPATIENT
Start: 2022-02-06 | End: 2022-02-06

## 2022-02-06 RX ORDER — LEVETIRACETAM 750 MG/1
750 TABLET ORAL 2 TIMES DAILY
COMMUNITY

## 2022-02-06 RX ORDER — IBUPROFEN 800 MG/1
800 TABLET ORAL EVERY 8 HOURS PRN
Qty: 30 TABLET | Refills: 0 | Status: SHIPPED | OUTPATIENT
Start: 2022-02-06 | End: 2022-11-01

## 2022-02-06 RX ORDER — LIDOCAINE HYDROCHLORIDE AND EPINEPHRINE 10; 10 MG/ML; UG/ML
20 INJECTION, SOLUTION INFILTRATION; PERINEURAL ONCE
Status: DISCONTINUED | OUTPATIENT
Start: 2022-02-06 | End: 2022-02-07 | Stop reason: HOSPADM

## 2022-02-06 RX ADMIN — CEFAZOLIN SODIUM 2000 MG: 1 INJECTION, POWDER, FOR SOLUTION INTRAMUSCULAR; INTRAVENOUS at 22:07

## 2022-02-06 RX ADMIN — HYDROCODONE BITARTRATE AND ACETAMINOPHEN 1 TABLET: 5; 325 TABLET ORAL at 20:02

## 2022-02-06 ASSESSMENT — PAIN SCALES - GENERAL: PAINLEVEL_OUTOF10: 6

## 2022-02-06 NOTE — Clinical Note
Ynes See was seen and treated in our emergency department on 2/6/2022. He may return to work on 02/09/2022. If you have any questions or concerns, please don't hesitate to call.       Juan Jackson

## 2022-02-07 NOTE — ED PROVIDER NOTES
[address] [address] [address] [facility] [address]  Dept: [phone]  Loc: [address] ENCOUNTER        This patient was not seen or evaluated by the attending physician. I evaluated this patient, the attending physician was available for consultation. CHIEF COMPLAINT    Chief Complaint   Patient presents with    Laceration     L elbow laceration and abrasion, hit on side of house during fall at 1900 today.  Hand Pain     R hand hit on ground after slipping on ice at 1900 today. HPI    Toby Londono is a 51 y.o. male who presents with left elbow and right hand pain pain. The onset was about 1900 today. The duration has been constant since the onset. The quality of the pain is sharp. The patient has no other associated injury. The context is that he slipped on the ice and fell at his home prior to arrival. He denies hitting his head or losing consciousness. He has no further complaints at this time. REVIEW OF SYSTEMS    Skin: With abrasion and laceration to left elbow, no puncture wounds  Musculoskeletal: see HPI, no other joint or bony injury or pain  Neurologic: No loss of consciousness, no head injury, no paresthesias or focal distal extremity weakness  Immunization: Tetanus status current    All other systems reviewed and are negative.     PAST MEDICAL & SURGICAL HISTORY    Past Medical History:   Diagnosis Date    Hyperlipidemia     Hypertension     Obesity     Seizures ([facility] [address]      Past Surgical History:   Procedure Laterality Date    FRACTURE SURGERY         CURRENT MEDICATIONS  (may include discharge medications prescribed in the ED)  Current Outpatient Rx   Medication Sig Dispense Refill    cephALEXin (KEFLEX) 500 MG capsule Take 1 capsule by mouth 4 times daily for 10 days 28 capsule 0    HYDROcodone-acetaminophen (NORCO) 5-325 MG per tablet Take 1 tablet by mouth every 8 hours as needed for Pain for up to 3 days. Intended supply: 3 days. Take lowest dose possible to manage pain 9 tablet 0    ibuprofen (ADVIL;MOTRIN) 800 MG tablet Take 1 tablet by mouth every 8 hours as needed for Pain 30 tablet 0    lisinopril (PRINIVIL;ZESTRIL) 40 MG tablet TAKE 1 TABLET BY MOUTH EVERY DAY 30 tablet 2    atorvastatin (LIPITOR) 10 MG tablet TAKE 1 TABLET BY MOUTH EVERY DAY 30 tablet 3    levETIRAcetam (KEPPRA) 500 MG tablet Take 1 tablet by mouth 2 times daily 60 tablet 0    multivitamin (THERAGRAN) per tablet Take 1 tablet by mouth daily. ALLERGIES    No Known Allergies    SOCIAL & FAMILY HISTORY    Social History     Socioeconomic History    Marital status:      Spouse name: Not on file    Number of children: Not on file    Years of education: Not on file    Highest education level: Not on file   Occupational History    Not on file   Tobacco Use    Smoking status: Former Smoker    Smokeless tobacco: Never Used   Substance and Sexual Activity    Alcohol use: Yes     Comment: 4 times a week    Drug use: No    Sexual activity: Not Currently     Partners: Female   Other Topics Concern    Not on file   Social History Narrative    Not on file     Social Determinants of Health     Financial Resource Strain:     Difficulty of Paying Living Expenses: Not on file   Food Insecurity:     Worried About Running Out of Food in the Last Year: Not on file    Bonnie of Food in the Last Year: Not on file   Transportation Needs:     Lack of Transportation (Medical): Not on file    Lack of Transportation (Non-Medical):  Not on file   Physical Activity:     Days of Exercise per Week: Not on file    Minutes of Exercise per Session: Not on file   Stress:     Feeling of Stress : Not on file   Social Connections:     Frequency of Communication with Friends and Family: Not on file    Frequency of Social Gatherings with Friends and Family: Not on file    Attends Confucianist Services: Not on file   Madeline Tripp Active Member of Clubs or Organizations: Not on file    Attends Club or Organization Meetings: Not on file    Marital Status: Not on file   Intimate Partner Violence:     Fear of Current or Ex-Partner: Not on file    Emotionally Abused: Not on file    Physically Abused: Not on file    Sexually Abused: Not on file   Housing Stability:     Unable to Pay for Housing in the Last Year: Not on file    Number of [place] in the Last Year: Not on file    Unstable Housing in the Last Year: Not on file     Family History   Problem Relation Age of Onset    Other Mother     Stroke Father          PHYSICAL EXAM    VITAL SIGNS: BP (!) 151/91   Pulse 109   Temp 98.7 °F (37.1 °C) (Oral)   Resp 16   Ht 6' 1\" (1.854 m)   Wt 261 lb 11 oz (118.7 kg)   SpO2 98%   BMI 34.53 kg/m²   Constitutional:  Well nourished, no acute distress  HENT:  Atraumatic, moist mucous membranes  NECK: normal range of motion,  supple   Respiratory:  No respiratory distress  Cardiovascular:  No JVD  Vascular: left and right radial pulse 2+, capillary refill less than 2 seconds  Musculoskeletal:  + tenderness to palpation of the hypothenar eminence of the right hand, no edema, no deformity, NVS intact distally. + tenderness to palpation of the left elbow with 1.5 cm laceration, bleeding controlled, no edema, no deformity, retains FROM. NVS intact distally. No cervical, thoracic, or lumbar spine tenderness. No bony tenderness along the spine. Patient is ambulatory without difficulty. Integument:  Well hydrated, with left elbow laceration as above, no erythema  Neurologic:  Awake alert, no slurred speech, sensory and motor intact    RADIOLOGY   XR ELBOW LEFT (MIN 3 VIEWS)   Final Result      1. Tiny calcific density near the anteromedial margin of the ulna or   coronoid process on a single view is indeterminate. This may just represent   capsular calcification. Minimal chip fracture is not excluded if suspected   clinically.       2. No evidence of joint effusion. 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 No evidence of fracture or malalignment. Lac Repair    Date/Time: 2/6/2022 9:41 PM  Performed by: STARR Lockett  Authorized by: STARR Lockett     Consent:     Consent obtained:  Verbal    Consent given by:  Patient    Risks discussed:  Infection, need for additional repair, nerve damage, poor cosmetic result, pain and poor wound healing    Alternatives discussed:  No treatment  Anesthesia (see MAR for exact dosages):      Anesthesia method:  Local infiltration    Local anesthetic:  Lidocaine 1% WITH epi  Laceration details:     Location:  Shoulder/arm    Shoulder/arm location:  L elbow    Length (cm):  1.5    Depth (mm):  4  Repair type:     Repair type:  Simple  Pre-procedure details:     Preparation:  Patient was prepped and draped in usual sterile fashion and imaging obtained to evaluate for foreign bodies  Exploration:     Hemostasis achieved with:  Direct pressure and epinephrine    Wound exploration: wound explored through full range of motion and entire depth of wound probed and visualized      Wound extent: no foreign bodies/material noted, no nerve damage noted and no vascular damage noted      Contaminated: yes    Treatment:     Area cleansed with:  Saline and Shur-Clens    Amount of cleaning:  Extensive    Irrigation solution:  Sterile saline    Irrigation volume:  500    Irrigation method:  Syringe and pressure wash    Visualized foreign bodies/material removed: no (none seen)    Skin repair:     Repair method:  Sutures    Suture size:  3-0    Suture material:  Prolene    Suture technique:  Simple interrupted    Number of sutures:  2  Approximation:     Approximation:  Loose (loose per request of Dr Henry Bhatti with orthopedics)  Post-procedure details:     Dressing:  Antibiotic ointment and bulky dressing Patient tolerance of procedure: Tolerated well, no immediate complications          ED COURSE & MEDICAL DECISION MAKING   See chart for medications given during emergency department course. Differential diagnosis: includes but not limited to Arterial Injury/Ischemia, Fracture, Dislocation, Infection, Compartment Syndrome, Neurologic Deficit/Injury. No evidence of neurovascular injury on exam.  Plain films as above with no acute abnormality noted to the right hand, and with a tiny calcific density near the anterior medial margin of the ulna that is indeterminate but may represent capsular calcification versus minimal chip fracture, with no evidence of joint effusion. I discussed the case with Dr. Shannon La, who reviewed the films with me. He recommended that the patient be given Ancef 2 g, be placed on Keflex, loose repair of the laceration, and follow-up in the office on Wednesday. Patient will be discharged home with Keflex, Norco, Motrin. He is given sedation precautions related to the [facility].  He is provided with a sling for comfort, but encouraged to do gentle range of motion exercises to help prevent adhesive capsulitis. Patient does begin to complain of some more generalized body aches and muscle cramping as he has been laying in the hospital bed since his fall. He does point out that his left shoulder has begun to bother him a little bit more than when he initially presented, but declines to have this imaged at this time, and plans to follow-up with orthopedics to have this further evaluated    The patient was instructed to follow up as an outpatient in 2 days. The patient was instructed to return to the ED immediately for any new or worsening symptoms. The patient verbalized understanding. FINAL IMPRESSION    1. Laceration of left elbow, initial encounter    2. Contusion of left hand, initial encounter    3.  Fall due to slipping on ice or snow, initial encounter        PLAN  Discharge with outpatient follow-up and discharge instructions (see EMR)    (Please note that this note was completed with a voice recognition program.  Every attempt was made to edit the dictations, but inevitably there remain words that are mis-transcribed.)          Nory Cha [place]  02/06/22 6761

## 2022-02-07 NOTE — ED NOTES
Eating dinner brought to him by family member. PA-C ok with pt eating before suturing laceration.         Adis Moses RN  02/06/22 4670

## 2022-02-07 NOTE — ED NOTES
Ready to go home. Antibiotic finished. Home with family. Pain left elbow at 4. Sling in place.      Anna Dobbins RN  02/07/22 3446

## 2022-02-07 NOTE — ED NOTES
Left elbow laceration and abrasion area cleaned again with hibiclens and saline. PSO  And then gauze bandage to area. Home wound care teaching with pt. Last tetanus shot 1.5 yrs ago. Pain at 4 left elbow. Encouraged continued ice at home. Aware to follow up with orthopedic as referred. Pt doesn't remember name of his PCP. Aware that sutures need to be removed in 10-12 days. Fitted for sling for left arm. Teaching with pt for home use of sling.      Jessa Snowden RN  02/07/22 0305

## 2022-11-01 ENCOUNTER — HOSPITAL ENCOUNTER (EMERGENCY)
Age: 47
Discharge: HOME | End: 2022-11-01
Payer: COMMERCIAL

## 2022-11-01 ENCOUNTER — APPOINTMENT (OUTPATIENT)
Dept: GENERAL RADIOLOGY | Age: 47
End: 2022-11-01
Payer: COMMERCIAL

## 2022-11-01 VITALS
HEIGHT: 73 IN | HEART RATE: 98 BPM | RESPIRATION RATE: 18 BRPM | OXYGEN SATURATION: 96 % | BODY MASS INDEX: 33.89 KG/M2 | TEMPERATURE: 99.5 F | WEIGHT: 255.73 LBS | SYSTOLIC BLOOD PRESSURE: 141 MMHG | DIASTOLIC BLOOD PRESSURE: 98 MMHG

## 2022-11-01 DIAGNOSIS — R50.9 FEBRILE ILLNESS: Primary | ICD-10-CM

## 2022-11-01 DIAGNOSIS — J06.9 URI WITH COUGH AND CONGESTION: ICD-10-CM

## 2022-11-01 LAB
RAPID INFLUENZA  B AGN: NEGATIVE
RAPID INFLUENZA A AGN: NEGATIVE
S PYO AG THROAT QL: NEGATIVE
SARS-COV-2, NAAT: NOT DETECTED

## 2022-11-01 PROCEDURE — 87804 INFLUENZA ASSAY W/OPTIC: CPT

## 2022-11-01 PROCEDURE — 87880 STREP A ASSAY W/OPTIC: CPT

## 2022-11-01 PROCEDURE — 71045 X-RAY EXAM CHEST 1 VIEW: CPT

## 2022-11-01 PROCEDURE — 87081 CULTURE SCREEN ONLY: CPT

## 2022-11-01 PROCEDURE — 87635 SARS-COV-2 COVID-19 AMP PRB: CPT

## 2022-11-01 PROCEDURE — 99284 EMERGENCY DEPT VISIT MOD MDM: CPT

## 2022-11-01 PROCEDURE — 6370000000 HC RX 637 (ALT 250 FOR IP): Performed by: EMERGENCY MEDICINE

## 2022-11-01 PROCEDURE — 6370000000 HC RX 637 (ALT 250 FOR IP): Performed by: PHYSICIAN ASSISTANT

## 2022-11-01 RX ORDER — ACETAMINOPHEN 500 MG
1000 TABLET ORAL ONCE
Status: COMPLETED | OUTPATIENT
Start: 2022-11-01 | End: 2022-11-01

## 2022-11-01 RX ORDER — IBUPROFEN 600 MG/1
600 TABLET ORAL EVERY 6 HOURS PRN
Qty: 120 TABLET | Refills: 0 | Status: SHIPPED | OUTPATIENT
Start: 2022-11-01

## 2022-11-01 RX ORDER — ONDANSETRON 4 MG/1
4 TABLET, ORALLY DISINTEGRATING ORAL EVERY 8 HOURS PRN
Qty: 20 TABLET | Refills: 0 | Status: SHIPPED | OUTPATIENT
Start: 2022-11-01

## 2022-11-01 RX ORDER — GUAIFENESIN/DEXTROMETHORPHAN 100-10MG/5
5 SYRUP ORAL 3 TIMES DAILY PRN
Qty: 120 ML | Refills: 0 | Status: SHIPPED | OUTPATIENT
Start: 2022-11-01 | End: 2022-11-11

## 2022-11-01 RX ORDER — IBUPROFEN 800 MG/1
800 TABLET ORAL EVERY 8 HOURS PRN
Status: DISCONTINUED | OUTPATIENT
Start: 2022-11-01 | End: 2022-11-01 | Stop reason: HOSPADM

## 2022-11-01 RX ORDER — ACETAMINOPHEN 325 MG/1
650 TABLET ORAL EVERY 6 HOURS PRN
Qty: 120 TABLET | Refills: 0 | Status: SHIPPED | OUTPATIENT
Start: 2022-11-01

## 2022-11-01 RX ADMIN — IBUPROFEN 800 MG: 800 TABLET, FILM COATED ORAL at 13:04

## 2022-11-01 RX ADMIN — ACETAMINOPHEN 1000 MG: 500 TABLET ORAL at 14:03

## 2022-11-01 ASSESSMENT — ENCOUNTER SYMPTOMS
VOMITING: 0
DIARRHEA: 0
SINUS PRESSURE: 0
ABDOMINAL PAIN: 0
SINUS PAIN: 0
EYE REDNESS: 0
TROUBLE SWALLOWING: 0
EYE DISCHARGE: 0
EYE PAIN: 0
NAUSEA: 0
CONSTIPATION: 0
EYE ITCHING: 0
COUGH: 1
PHOTOPHOBIA: 0
COLOR CHANGE: 0
VOICE CHANGE: 0
BACK PAIN: 0
CHEST TIGHTNESS: 0
SHORTNESS OF BREATH: 0
SORE THROAT: 0
RHINORRHEA: 1

## 2022-11-01 ASSESSMENT — PAIN SCALES - GENERAL
PAINLEVEL_OUTOF10: 8
PAINLEVEL_OUTOF10: 6
PAINLEVEL_OUTOF10: 4
PAINLEVEL_OUTOF10: 4

## 2022-11-01 ASSESSMENT — PAIN - FUNCTIONAL ASSESSMENT: PAIN_FUNCTIONAL_ASSESSMENT: 0-10

## 2022-11-01 ASSESSMENT — PAIN DESCRIPTION - LOCATION: LOCATION: GENERALIZED

## 2022-11-01 ASSESSMENT — PAIN DESCRIPTION - DESCRIPTORS: DESCRIPTORS: ACHING

## 2022-11-01 NOTE — ED PROVIDER NOTES
[facility] ENCOUNTER        Pt Name: Amparo Jung  MRN: [id number]  [place] 1975  Date of evaluation: 11/1/2022  Provider: STARR Dobbs  PCP: No primary care provider on file. Note Started: 1:57 PM EDT       WILLIAMS. I have evaluated this patient. My supervising physician was available for consultation. CHIEF COMPLAINT       Chief Complaint   Patient presents with    Fever     Started last night       HISTORY OF PRESENT ILLNESS   (Location, Timing/Onset, Context/Setting, Quality, Duration, Modifying Factors, Severity, Associated Signs and Symptoms)  Note limiting factors. Chief Complaint: Fever     Amparo Jung is a 49 y.o. male with past medical history of previous CVA, hyperlipidemia, hypertension, obesity and seizure disorder who presents to the ED with complaint of a fever. States since last night has had fever. States temperatures as high as 104. Denies taking any antipyretics prior to arrival.  Was given ibuprofen upon arrival.  Denies sick contacts or recent travel. States has associated headache, myalgias, arthralgias, nonproductive cough, rhinorrhea, congestion and sore throat. Denies sinus pressure/pain, ear pain/drainage, neck pain/stiffness, chest pain, shortness of breath, [place] pain, nausea/vomiting, urinary symptoms or changes in bowel movements. Denies any rashes or lesions. Became concerned and came to the ED for further evaluation and treatment. Complains of aching pain rated 8/10 to the head and multiple joints/muscles. Nursing Notes were all reviewed and agreed with or any disagreements were addressed in the HPI. REVIEW OF SYSTEMS    (2-9 systems for level 4, 10 or more for level 5)     Review of Systems   Constitutional:  Positive for activity change, appetite change, chills and fever. Negative for diaphoresis and fatigue. HENT:  Positive for congestion and rhinorrhea.  Negative for ear discharge, ear pain, postnasal drip, sinus pressure, sinus pain, sore throat, trouble swallowing and voice change. Eyes:  Negative for photophobia, pain, discharge, redness, itching and visual disturbance. Respiratory:  Positive for cough. Negative for chest tightness and shortness of breath. Cardiovascular: Negative. Negative for chest pain, palpitations and leg swelling. Gastrointestinal:  Negative for abdominal pain, constipation, diarrhea, nausea and vomiting. Genitourinary:  Negative for decreased urine volume, difficulty urinating, dysuria, flank pain, frequency, hematuria and urgency. Musculoskeletal:  Positive for arthralgias and myalgias. Negative for back pain, gait problem, joint swelling, neck pain and neck stiffness. Skin:  Negative for color change, pallor, rash and wound. Neurological:  Positive for headaches. Negative for dizziness, tremors, seizures, syncope, facial asymmetry, speech difficulty, weakness, light-headedness and numbness. Positives and Pertinent negatives as per HPI. Except as noted above in the ROS, all other systems were reviewed and negative. PAST MEDICAL HISTORY     Past Medical History:   Diagnosis Date    Cerebral artery occlusion with cerebral infarction ([facility] [address]     Hyperlipidemia     Hypertension     Obesity     Seizures ([facility] [address]          SURGICAL HISTORY     Past Surgical History:   Procedure Laterality Date    FRACTURE SURGERY           CURRENTMEDICATIONS       Previous Medications    ATORVASTATIN (LIPITOR) 10 MG TABLET    TAKE 1 TABLET BY MOUTH EVERY DAY    LEVETIRACETAM (KEPPRA) 750 MG TABLET    Take 750 mg by mouth 2 times daily    LISINOPRIL (PRINIVIL;ZESTRIL) 40 MG TABLET    TAKE 1 TABLET BY MOUTH EVERY DAY    MULTIVITAMIN (THERAGRAN) PER TABLET    Take 1 tablet by mouth daily. ALLERGIES     Patient has no known allergies.     FAMILYHISTORY       Family History   Problem Relation Age of Onset    Other Mother     Stroke Father SOCIAL HISTORY       Social History     Tobacco Use    Smoking status: Former    Smokeless tobacco: Never   Substance Use Topics    Alcohol use: Yes     Comment: 4 times a week    Drug use: No       SCREENINGS    [place] Coma Scale  Eye Opening: Spontaneous  Best Verbal Response: Oriented  Best Motor Response: Obeys commands  Mike Coma Scale Score: 15        PHYSICAL EXAM    (up to 7 for level 4, 8 or more for level 5)     ED Triage Vitals   BP Temp Temp Source Heart Rate Resp SpO2 Height Weight   11/01/22 1251 11/01/22 1251 11/01/22 1251 11/01/22 1338 11/01/22 1251 11/01/22 1251 11/01/22 1251 11/01/22 1251   (!) 155/105 (!) 103.3 °F (39.6 °C) Oral (!) 103 18 98 % 6' 1\" (1.854 m) 255 lb 11.7 oz (116 kg)       Physical Exam  Constitutional:       General: He is not in acute distress. Appearance: Normal appearance. He is well-developed. He is not ill-appearing, toxic-appearing or diaphoretic. HENT:      Head: Normocephalic and atraumatic. Right Ear: Tympanic membrane, ear canal and external ear normal. There is no impacted cerumen. Left Ear: Tympanic membrane, ear canal and external ear normal. There is no impacted cerumen. Nose: Nose normal. No congestion or rhinorrhea. Mouth/Throat:      Mouth: Mucous membranes are moist.      Pharynx: Posterior oropharyngeal erythema present. No oropharyngeal exudate. Comments: Posterior oropharynx erythematous without significant tonsillar exudate. There is some slight symmetrical tonsillar enlargement noted bilaterally. No uvular deviation. No drooling, trismus, stridor or respiratory stress noted. No changes in phonation. Eyes:      General:         Right eye: No discharge. Left eye: No discharge. Conjunctiva/sclera: Conjunctivae normal.   Neck:      Comments: No meningismus. Cardiovascular:      Rate and Rhythm: Normal rate and regular rhythm. Pulses: Normal pulses. Heart sounds: Normal heart sounds.  No murmur heard.    No friction rub. No gallop. Pulmonary:      Effort: Pulmonary effort is normal. No respiratory distress. Breath sounds: Normal breath sounds. No stridor. No wheezing, rhonchi or rales. Chest:      Chest wall: No tenderness. Abdominal:      General: Abdomen is flat. There is no distension. Palpations: Abdomen is soft. There is no mass. Tenderness: There is no abdominal tenderness. There is no right CVA tenderness, left CVA tenderness, guarding or rebound. Hernia: No hernia is present. Musculoskeletal:         General: Normal range of motion. Cervical back: Normal range of motion and neck supple. No rigidity or tenderness. Lymphadenopathy:      Cervical: No cervical adenopathy. Skin:     General: Skin is warm and dry. Coloration: Skin is not pale. Findings: No erythema or rash. Neurological:      Mental Status: He is alert and oriented to person, place, and time. Psychiatric:         Behavior: Behavior normal.       DIAGNOSTIC RESULTS   LABS:    Labs Reviewed   COVID-19, RAPID   RAPID INFLUENZA A/B ANTIGENS   STREP SCREEN GROUP A THROAT   CULTURE, BETA STREP CONFIRM PLATES       When ordered only abnormal lab results are displayed. All other labs were within normal range or not returned as of this dictation. EKG: When ordered, EKG's are interpreted by the Emergency Department Physician in the absence of a cardiologist.  Please see their note for interpretation of EKG. RADIOLOGY:   Non-plain film images such as CT, Ultrasound and MRI are read by the radiologist. Plain radiographic images are visualized and preliminarily interpreted by the ED Provider with the below findings:        Interpretation per the Radiologist below, if available at the time of this note:    XR CHEST PORTABLE   Final Result   No acute cardiopulmonary disease. No results found.         PROCEDURES   Unless otherwise noted below, none     Procedures    CRITICAL CARE TIME CONSULTS:  None      EMERGENCY DEPARTMENT COURSE and DIFFERENTIAL DIAGNOSIS/MDM:   Vitals:    Vitals:    11/01/22 1345 11/01/22 1400 11/01/22 1415 11/01/22 1455   BP:   (!) 144/100    Pulse:   98    Resp:       Temp:   (!) 102.3 °F (39.1 °C) 99.5 °F (37.5 °C)   TempSrc:       SpO2: 93% 93% 93% 96%   Weight:       Height:           Patient was given the following medications:  Medications   ibuprofen (ADVIL;MOTRIN) tablet 800 mg (800 mg Oral Given 11/1/22 1304)   acetaminophen (TYLENOL) tablet 1,000 mg (1,000 mg Oral Given 11/1/22 1403)         Is this patient to be included in the SEP-1 Core Measure due to severe sepsis or septic shock? No   Exclusion criteria - the patient is NOT to be included for SEP-1 Core Measure due to:  Viral etiology found or highly suspected (including COVID-19) without concomitant bacterial infection    Patient is a 50-year-old male who presents to the ED with complaint of fever and upper respiratory symptoms. Patient states for the past couple days he has had fever, chills, myalgias, arthralgias, cough, rhinorrhea, congestion and headache. Patient denies taking any antipyretics prior to arrival.  Fever upon arrival 103. 3. He is not tachypneic or t significantly tachycardic. Pulse was 103. Most likely elevated secondary to fever. With improvement of fever pulse improved. Repeat temp 99.5 after 900 mg p.o. ibuprofen and 1 g p.o. Tylenol here in the ED. Patient is feeling better. States aching pain rated 2/10 at this time. He is sweaty at this time most likely secondary to breaking of fever. His flu swab was negative. COVID swab was negative. Strep swab was negative. Chest x-ray unremarkable. Do not believe further imaging or work-up indicated this time. Believe he is suffering from febrile illness secondary to viral illness at this time.   Low sufficient for meningitis, sepsis, PTA, retropharyngeal abscess, epiglottitis, bacterial tracheitis, respiratory distress, pneumonia, surgical abdomen or other emergent etiology at this time. FINAL IMPRESSION      1. Febrile illness    2.  URI with cough and congestion          DISPOSITION/PLAN   DISPOSITION Decision To Discharge 11/01/2022 02:56:04 PM      PATIENT REFERRED TO:  [place] [id number]  [phone]  Go to   As needed, If symptoms worsen    DISCHARGE MEDICATIONS:  New Prescriptions    ACETAMINOPHEN (AMINOFEN) 325 MG TABLET    Take 2 tablets by mouth every 6 hours as needed for Pain or Fever    GUAIFENESIN-DEXTROMETHORPHAN (ROBITUSSIN DM) 100-10 MG/5ML SYRUP    Take 5 mLs by mouth 3 times daily as needed for Cough    IBUPROFEN (IBU) 600 MG TABLET    Take 1 tablet by mouth every 6 hours as needed for Pain or Fever    ONDANSETRON (ZOFRAN ODT) 4 MG DISINTEGRATING TABLET    Take 1 tablet by mouth every 8 hours as needed for Nausea       DISCONTINUED MEDICATIONS:  Discontinued Medications    IBUPROFEN (ADVIL;MOTRIN) 800 MG TABLET    Take 1 tablet by mouth every 8 hours as needed for Pain              (Please note that portions of this note were completed with a voice recognition program.  Efforts were made to edit the dictations but occasionally words are mis-transcribed.)    STARR Goodwin (electronically signed)          STARR Figueroa  11/01/22 4259

## 2022-11-02 NOTE — ED NOTES
Dc'd to home  Aware to continue to treat fever  To get a thermometer  To follow up with pmd  To return for worsening or changes  Walked out with ease     Casi De La Rosa RN  11/02/22 1234

## 2022-11-03 LAB — S PYO THROAT QL CULT: NORMAL

## 2024-02-19 ENCOUNTER — APPOINTMENT (OUTPATIENT)
Dept: GENERAL RADIOLOGY | Age: 49
DRG: 917 | End: 2024-02-19
Payer: COMMERCIAL

## 2024-02-19 ENCOUNTER — APPOINTMENT (OUTPATIENT)
Dept: CT IMAGING | Age: 49
DRG: 917 | End: 2024-02-19
Payer: COMMERCIAL

## 2024-02-19 ENCOUNTER — HOSPITAL ENCOUNTER (INPATIENT)
Age: 49
LOS: 1 days | Discharge: HOME | DRG: 917 | End: 2024-02-20
Attending: STUDENT IN AN ORGANIZED HEALTH CARE EDUCATION/TRAINING PROGRAM | Admitting: INTERNAL MEDICINE
Payer: COMMERCIAL

## 2024-02-19 DIAGNOSIS — Z71.89 GOALS OF CARE, COUNSELING/DISCUSSION: ICD-10-CM

## 2024-02-19 DIAGNOSIS — R07.9 CHEST PAIN, UNSPECIFIED TYPE: ICD-10-CM

## 2024-02-19 DIAGNOSIS — F19.10 SUBSTANCE ABUSE (HCC): ICD-10-CM

## 2024-02-19 DIAGNOSIS — G40.919 BREAKTHROUGH SEIZURE (HCC): Primary | ICD-10-CM

## 2024-02-19 DIAGNOSIS — R79.89 ELEVATED TROPONIN: ICD-10-CM

## 2024-02-19 PROBLEM — I10 ESSENTIAL HYPERTENSION: Status: ACTIVE | Noted: 2024-02-19

## 2024-02-19 PROBLEM — R07.89 CHEST TIGHTNESS: Status: ACTIVE | Noted: 2024-02-19

## 2024-02-19 PROBLEM — G40.909 SEIZURE DISORDER (HCC): Status: ACTIVE | Noted: 2024-02-19

## 2024-02-19 PROBLEM — M54.50 ACUTE MIDLINE LOW BACK PAIN WITHOUT SCIATICA: Status: ACTIVE | Noted: 2024-02-19

## 2024-02-19 PROBLEM — F19.90 ILLICIT DRUG USE: Status: ACTIVE | Noted: 2024-02-19

## 2024-02-19 LAB
ALBUMIN SERPL-MCNC: 4.4 G/DL (ref 3.4–5)
ALBUMIN/GLOB SERPL: 1.5 {RATIO} (ref 1.1–2.2)
ALP SERPL-CCNC: 94 U/L (ref 40–129)
ALT SERPL-CCNC: 49 U/L (ref 10–40)
AMPHETAMINES UR QL SCN>1000 NG/ML: ABNORMAL
ANION GAP SERPL CALCULATED.3IONS-SCNC: 12 MMOL/L (ref 3–16)
ANION GAP SERPL CALCULATED.3IONS-SCNC: 26 MMOL/L (ref 3–16)
AST SERPL-CCNC: 38 U/L (ref 15–37)
BARBITURATES UR QL SCN>200 NG/ML: ABNORMAL
BASOPHILS # BLD: 0 K/UL (ref 0–0.2)
BASOPHILS # BLD: 0 K/UL (ref 0–0.2)
BASOPHILS NFR BLD: 0.3 %
BASOPHILS NFR BLD: 0.4 %
BENZODIAZ UR QL SCN>200 NG/ML: ABNORMAL
BILIRUB SERPL-MCNC: 0.7 MG/DL (ref 0–1)
BILIRUB UR QL STRIP.AUTO: NEGATIVE
BUN SERPL-MCNC: 11 MG/DL (ref 7–20)
BUN SERPL-MCNC: 9 MG/DL (ref 7–20)
CALCIUM SERPL-MCNC: 9.3 MG/DL (ref 8.3–10.6)
CALCIUM SERPL-MCNC: 9.9 MG/DL (ref 8.3–10.6)
CANNABINOIDS UR QL SCN>50 NG/ML: POSITIVE
CHLORIDE SERPL-SCNC: 100 MMOL/L (ref 99–110)
CHLORIDE SERPL-SCNC: 96 MMOL/L (ref 99–110)
CLARITY UR: CLEAR
CO2 SERPL-SCNC: 13 MMOL/L (ref 21–32)
CO2 SERPL-SCNC: 24 MMOL/L (ref 21–32)
COCAINE UR QL SCN: POSITIVE
COLOR UR: YELLOW
CREAT SERPL-MCNC: 0.7 MG/DL (ref 0.9–1.3)
CREAT SERPL-MCNC: 1.1 MG/DL (ref 0.9–1.3)
DEPRECATED RDW RBC AUTO: 12.8 % (ref 12.4–15.4)
DEPRECATED RDW RBC AUTO: 13.1 % (ref 12.4–15.4)
DRUG SCREEN COMMENT UR-IMP: ABNORMAL
EKG ATRIAL RATE: 101 BPM
EKG DIAGNOSIS: NORMAL
EKG P AXIS: 31 DEGREES
EKG P-R INTERVAL: 162 MS
EKG Q-T INTERVAL: 366 MS
EKG QRS DURATION: 98 MS
EKG QTC CALCULATION (BAZETT): 474 MS
EKG R AXIS: 10 DEGREES
EKG T AXIS: 13 DEGREES
EKG VENTRICULAR RATE: 101 BPM
EOSINOPHIL # BLD: 0.1 K/UL (ref 0–0.6)
EOSINOPHIL # BLD: 0.1 K/UL (ref 0–0.6)
EOSINOPHIL NFR BLD: 0.5 %
EOSINOPHIL NFR BLD: 1 %
EPI CELLS #/AREA URNS HPF: NORMAL /HPF (ref 0–5)
ETHANOLAMINE SERPL-MCNC: NORMAL MG/DL (ref 0–0.08)
FENTANYL SCREEN, URINE: ABNORMAL
GFR SERPLBLD CREATININE-BSD FMLA CKD-EPI: >60 ML/MIN/{1.73_M2}
GFR SERPLBLD CREATININE-BSD FMLA CKD-EPI: >60 ML/MIN/{1.73_M2}
GLUCOSE BLD-MCNC: 186 MG/DL (ref 70–99)
GLUCOSE SERPL-MCNC: 185 MG/DL (ref 70–99)
GLUCOSE SERPL-MCNC: 95 MG/DL (ref 70–99)
GLUCOSE UR STRIP.AUTO-MCNC: NEGATIVE MG/DL
HCT VFR BLD AUTO: 42.7 % (ref 40.5–52.5)
HCT VFR BLD AUTO: 46.1 % (ref 40.5–52.5)
HGB BLD-MCNC: 14.8 G/DL (ref 13.5–17.5)
HGB BLD-MCNC: 15.3 G/DL (ref 13.5–17.5)
HGB UR QL STRIP.AUTO: ABNORMAL
KETONES UR STRIP.AUTO-MCNC: NEGATIVE MG/DL
LEUKOCYTE ESTERASE UR QL STRIP.AUTO: NEGATIVE
LYMPHOCYTES # BLD: 1.8 K/UL (ref 1–5.1)
LYMPHOCYTES # BLD: 2.4 K/UL (ref 1–5.1)
LYMPHOCYTES NFR BLD: 17.8 %
LYMPHOCYTES NFR BLD: 22.4 %
MAGNESIUM SERPL-MCNC: 1.9 MG/DL (ref 1.8–2.4)
MCH RBC QN AUTO: 31 PG (ref 26–34)
MCH RBC QN AUTO: 31.2 PG (ref 26–34)
MCHC RBC AUTO-ENTMCNC: 33.2 G/DL (ref 31–36)
MCHC RBC AUTO-ENTMCNC: 34.6 G/DL (ref 31–36)
MCV RBC AUTO: 89.6 FL (ref 80–100)
MCV RBC AUTO: 93.9 FL (ref 80–100)
METHADONE UR QL SCN>300 NG/ML: ABNORMAL
MONOCYTES # BLD: 1.2 K/UL (ref 0–1.3)
MONOCYTES # BLD: 1.2 K/UL (ref 0–1.3)
MONOCYTES NFR BLD: 15.1 %
MONOCYTES NFR BLD: 9 %
NEUTROPHILS # BLD: 4.8 K/UL (ref 1.7–7.7)
NEUTROPHILS # BLD: 9.8 K/UL (ref 1.7–7.7)
NEUTROPHILS NFR BLD: 61.1 %
NEUTROPHILS NFR BLD: 72.4 %
NITRITE UR QL STRIP.AUTO: NEGATIVE
OPIATES UR QL SCN>300 NG/ML: ABNORMAL
OXYCODONE UR QL SCN: ABNORMAL
PCP UR QL SCN>25 NG/ML: ABNORMAL
PERFORMED ON: ABNORMAL
PH UR STRIP.AUTO: 6 [PH] (ref 5–8)
PH UR STRIP: 6 [PH]
PLATELET # BLD AUTO: 196 K/UL (ref 135–450)
PLATELET # BLD AUTO: 212 K/UL (ref 135–450)
PMV BLD AUTO: 9.2 FL (ref 5–10.5)
PMV BLD AUTO: 9.5 FL (ref 5–10.5)
POTASSIUM SERPL-SCNC: 3.6 MMOL/L (ref 3.5–5.1)
POTASSIUM SERPL-SCNC: 3.9 MMOL/L (ref 3.5–5.1)
PROT SERPL-MCNC: 7.4 G/DL (ref 6.4–8.2)
PROT UR STRIP.AUTO-MCNC: 30 MG/DL
RBC # BLD AUTO: 4.76 M/UL (ref 4.2–5.9)
RBC # BLD AUTO: 4.91 M/UL (ref 4.2–5.9)
RBC #/AREA URNS HPF: NORMAL /HPF (ref 0–4)
SODIUM SERPL-SCNC: 135 MMOL/L (ref 136–145)
SODIUM SERPL-SCNC: 136 MMOL/L (ref 136–145)
SP GR UR STRIP.AUTO: 1.02 (ref 1–1.03)
TRICHOMONAS #/AREA URNS HPF: NORMAL /HPF
TROPONIN, HIGH SENSITIVITY: 18 NG/L (ref 0–22)
TROPONIN, HIGH SENSITIVITY: 18 NG/L (ref 0–22)
TROPONIN, HIGH SENSITIVITY: 25 NG/L (ref 0–22)
UA COMPLETE W REFLEX CULTURE PNL UR: ABNORMAL
UA DIPSTICK W REFLEX MICRO PNL UR: YES
URN SPEC COLLECT METH UR: ABNORMAL
UROBILINOGEN UR STRIP-ACNC: 0.2 E.U./DL
WBC # BLD AUTO: 13.5 K/UL (ref 4–11)
WBC # BLD AUTO: 7.9 K/UL (ref 4–11)
WBC #/AREA URNS HPF: NORMAL /HPF (ref 0–5)

## 2024-02-19 PROCEDURE — 80053 COMPREHEN METABOLIC PANEL: CPT

## 2024-02-19 PROCEDURE — 71260 CT THORAX DX C+: CPT

## 2024-02-19 PROCEDURE — 96375 TX/PRO/DX INJ NEW DRUG ADDON: CPT

## 2024-02-19 PROCEDURE — 6370000000 HC RX 637 (ALT 250 FOR IP): Performed by: STUDENT IN AN ORGANIZED HEALTH CARE EDUCATION/TRAINING PROGRAM

## 2024-02-19 PROCEDURE — 72131 CT LUMBAR SPINE W/O DYE: CPT

## 2024-02-19 PROCEDURE — 82077 ASSAY SPEC XCP UR&BREATH IA: CPT

## 2024-02-19 PROCEDURE — 6360000002 HC RX W HCPCS: Performed by: INTERNAL MEDICINE

## 2024-02-19 PROCEDURE — 2580000003 HC RX 258: Performed by: INTERNAL MEDICINE

## 2024-02-19 PROCEDURE — 36415 COLL VENOUS BLD VENIPUNCTURE: CPT

## 2024-02-19 PROCEDURE — 2060000000 HC ICU INTERMEDIATE R&B

## 2024-02-19 PROCEDURE — 93005 ELECTROCARDIOGRAM TRACING: CPT | Performed by: STUDENT IN AN ORGANIZED HEALTH CARE EDUCATION/TRAINING PROGRAM

## 2024-02-19 PROCEDURE — 70450 CT HEAD/BRAIN W/O DYE: CPT

## 2024-02-19 PROCEDURE — 2580000003 HC RX 258: Performed by: STUDENT IN AN ORGANIZED HEALTH CARE EDUCATION/TRAINING PROGRAM

## 2024-02-19 PROCEDURE — 6360000002 HC RX W HCPCS: Performed by: STUDENT IN AN ORGANIZED HEALTH CARE EDUCATION/TRAINING PROGRAM

## 2024-02-19 PROCEDURE — 6370000000 HC RX 637 (ALT 250 FOR IP): Performed by: INTERNAL MEDICINE

## 2024-02-19 PROCEDURE — 93010 ELECTROCARDIOGRAM REPORT: CPT | Performed by: INTERNAL MEDICINE

## 2024-02-19 PROCEDURE — 85025 COMPLETE CBC W/AUTO DIFF WBC: CPT

## 2024-02-19 PROCEDURE — 6360000004 HC RX CONTRAST MEDICATION: Performed by: STUDENT IN AN ORGANIZED HEALTH CARE EDUCATION/TRAINING PROGRAM

## 2024-02-19 PROCEDURE — 81001 URINALYSIS AUTO W/SCOPE: CPT

## 2024-02-19 PROCEDURE — 71045 X-RAY EXAM CHEST 1 VIEW: CPT

## 2024-02-19 PROCEDURE — 80307 DRUG TEST PRSMV CHEM ANLYZR: CPT

## 2024-02-19 PROCEDURE — 84484 ASSAY OF TROPONIN QUANT: CPT

## 2024-02-19 PROCEDURE — 96365 THER/PROPH/DIAG IV INF INIT: CPT

## 2024-02-19 PROCEDURE — 83735 ASSAY OF MAGNESIUM: CPT

## 2024-02-19 PROCEDURE — 99285 EMERGENCY DEPT VISIT HI MDM: CPT

## 2024-02-19 RX ORDER — LEVETIRACETAM 500 MG/1
750 TABLET ORAL 2 TIMES DAILY
Status: DISCONTINUED | OUTPATIENT
Start: 2024-02-19 | End: 2024-02-20 | Stop reason: HOSPADM

## 2024-02-19 RX ORDER — ONDANSETRON 2 MG/ML
4 INJECTION INTRAMUSCULAR; INTRAVENOUS EVERY 4 HOURS PRN
Status: DISCONTINUED | OUTPATIENT
Start: 2024-02-19 | End: 2024-02-20 | Stop reason: HOSPADM

## 2024-02-19 RX ORDER — ACETAMINOPHEN 325 MG/1
650 TABLET ORAL EVERY 4 HOURS PRN
Status: DISCONTINUED | OUTPATIENT
Start: 2024-02-19 | End: 2024-02-20 | Stop reason: HOSPADM

## 2024-02-19 RX ORDER — ATORVASTATIN CALCIUM 10 MG/1
10 TABLET, FILM COATED ORAL DAILY
Status: DISCONTINUED | OUTPATIENT
Start: 2024-02-19 | End: 2024-02-20

## 2024-02-19 RX ORDER — HYDROCODONE BITARTRATE AND ACETAMINOPHEN 5; 325 MG/1; MG/1
1 TABLET ORAL ONCE
Status: COMPLETED | OUTPATIENT
Start: 2024-02-19 | End: 2024-02-19

## 2024-02-19 RX ORDER — MAGNESIUM SULFATE IN WATER 40 MG/ML
2000 INJECTION, SOLUTION INTRAVENOUS PRN
Status: DISCONTINUED | OUTPATIENT
Start: 2024-02-19 | End: 2024-02-20 | Stop reason: HOSPADM

## 2024-02-19 RX ORDER — FENTANYL CITRATE 50 UG/ML
50 INJECTION, SOLUTION INTRAMUSCULAR; INTRAVENOUS ONCE
Status: COMPLETED | OUTPATIENT
Start: 2024-02-19 | End: 2024-02-19

## 2024-02-19 RX ORDER — KETOROLAC TROMETHAMINE 30 MG/ML
30 INJECTION, SOLUTION INTRAMUSCULAR; INTRAVENOUS EVERY 6 HOURS PRN
Status: DISCONTINUED | OUTPATIENT
Start: 2024-02-19 | End: 2024-02-20 | Stop reason: HOSPADM

## 2024-02-19 RX ORDER — DEXAMETHASONE SODIUM PHOSPHATE 10 MG/ML
10 INJECTION, SOLUTION INTRAMUSCULAR; INTRAVENOUS ONCE
Status: COMPLETED | OUTPATIENT
Start: 2024-02-19 | End: 2024-02-19

## 2024-02-19 RX ORDER — ACETAMINOPHEN 650 MG/1
650 SUPPOSITORY RECTAL EVERY 4 HOURS PRN
Status: DISCONTINUED | OUTPATIENT
Start: 2024-02-19 | End: 2024-02-20 | Stop reason: HOSPADM

## 2024-02-19 RX ORDER — REGADENOSON 0.08 MG/ML
0.4 INJECTION, SOLUTION INTRAVENOUS
Status: COMPLETED | OUTPATIENT
Start: 2024-02-19 | End: 2024-02-20

## 2024-02-19 RX ORDER — LISINOPRIL 40 MG/1
40 TABLET ORAL DAILY
Status: DISCONTINUED | OUTPATIENT
Start: 2024-02-19 | End: 2024-02-20 | Stop reason: HOSPADM

## 2024-02-19 RX ORDER — SODIUM CHLORIDE 0.9 % (FLUSH) 0.9 %
10 SYRINGE (ML) INJECTION EVERY 12 HOURS SCHEDULED
Status: DISCONTINUED | OUTPATIENT
Start: 2024-02-19 | End: 2024-02-20 | Stop reason: HOSPADM

## 2024-02-19 RX ORDER — ASPIRIN 81 MG/1
324 TABLET, CHEWABLE ORAL ONCE
Status: DISCONTINUED | OUTPATIENT
Start: 2024-02-19 | End: 2024-02-19

## 2024-02-19 RX ORDER — SODIUM CHLORIDE 9 MG/ML
INJECTION, SOLUTION INTRAVENOUS PRN
Status: DISCONTINUED | OUTPATIENT
Start: 2024-02-19 | End: 2024-02-20 | Stop reason: HOSPADM

## 2024-02-19 RX ORDER — ORPHENADRINE CITRATE 30 MG/ML
60 INJECTION INTRAMUSCULAR; INTRAVENOUS ONCE
Status: COMPLETED | OUTPATIENT
Start: 2024-02-19 | End: 2024-02-19

## 2024-02-19 RX ORDER — POTASSIUM CHLORIDE 7.45 MG/ML
10 INJECTION INTRAVENOUS PRN
Status: DISCONTINUED | OUTPATIENT
Start: 2024-02-19 | End: 2024-02-20 | Stop reason: HOSPADM

## 2024-02-19 RX ORDER — 0.9 % SODIUM CHLORIDE 0.9 %
1000 INTRAVENOUS SOLUTION INTRAVENOUS ONCE
Status: COMPLETED | OUTPATIENT
Start: 2024-02-19 | End: 2024-02-19

## 2024-02-19 RX ORDER — SODIUM CHLORIDE 0.9 % (FLUSH) 0.9 %
10 SYRINGE (ML) INJECTION PRN
Status: DISCONTINUED | OUTPATIENT
Start: 2024-02-19 | End: 2024-02-20 | Stop reason: HOSPADM

## 2024-02-19 RX ORDER — POLYETHYLENE GLYCOL 3350 17 G/17G
17 POWDER, FOR SOLUTION ORAL DAILY PRN
Status: DISCONTINUED | OUTPATIENT
Start: 2024-02-19 | End: 2024-02-20 | Stop reason: HOSPADM

## 2024-02-19 RX ORDER — ASPIRIN 81 MG/1
324 TABLET, CHEWABLE ORAL ONCE
Status: COMPLETED | OUTPATIENT
Start: 2024-02-19 | End: 2024-02-19

## 2024-02-19 RX ORDER — POTASSIUM CHLORIDE 20 MEQ/1
40 TABLET, EXTENDED RELEASE ORAL PRN
Status: DISCONTINUED | OUTPATIENT
Start: 2024-02-19 | End: 2024-02-20 | Stop reason: HOSPADM

## 2024-02-19 RX ORDER — ENOXAPARIN SODIUM 100 MG/ML
30 INJECTION SUBCUTANEOUS 2 TIMES DAILY
Status: DISCONTINUED | OUTPATIENT
Start: 2024-02-19 | End: 2024-02-20 | Stop reason: HOSPADM

## 2024-02-19 RX ORDER — HYDRALAZINE HYDROCHLORIDE 20 MG/ML
10 INJECTION INTRAMUSCULAR; INTRAVENOUS EVERY 4 HOURS PRN
Status: DISCONTINUED | OUTPATIENT
Start: 2024-02-19 | End: 2024-02-20 | Stop reason: HOSPADM

## 2024-02-19 RX ORDER — IBUPROFEN 600 MG/1
600 TABLET ORAL EVERY 6 HOURS PRN
Status: DISCONTINUED | OUTPATIENT
Start: 2024-02-19 | End: 2024-02-19

## 2024-02-19 RX ADMIN — FENTANYL CITRATE 50 MCG: 50 INJECTION INTRAMUSCULAR; INTRAVENOUS at 08:41

## 2024-02-19 RX ADMIN — HYDROCODONE BITARTRATE AND ACETAMINOPHEN 1 TABLET: 5; 325 TABLET ORAL at 12:55

## 2024-02-19 RX ADMIN — ENOXAPARIN SODIUM 30 MG: 100 INJECTION SUBCUTANEOUS at 20:37

## 2024-02-19 RX ADMIN — ASPIRIN 324 MG: 81 TABLET, CHEWABLE ORAL at 13:43

## 2024-02-19 RX ADMIN — ORPHENADRINE CITRATE 60 MG: 60 INJECTION INTRAMUSCULAR; INTRAVENOUS at 08:36

## 2024-02-19 RX ADMIN — LEVETIRACETAM 750 MG: 500 TABLET, FILM COATED ORAL at 20:36

## 2024-02-19 RX ADMIN — LEVETIRACETAM 2000 MG: 100 INJECTION, SOLUTION INTRAVENOUS at 08:30

## 2024-02-19 RX ADMIN — DEXAMETHASONE SODIUM PHOSPHATE 10 MG: 10 INJECTION INTRAMUSCULAR; INTRAVENOUS at 17:58

## 2024-02-19 RX ADMIN — IOMEPROL INJECTION 100 ML: 714 INJECTION, SOLUTION INTRAVASCULAR at 09:10

## 2024-02-19 RX ADMIN — ACETAMINOPHEN 650 MG: 325 TABLET ORAL at 20:37

## 2024-02-19 RX ADMIN — SODIUM CHLORIDE 1000 ML: 9 INJECTION, SOLUTION INTRAVENOUS at 08:23

## 2024-02-19 RX ADMIN — ATORVASTATIN CALCIUM 10 MG: 10 TABLET, FILM COATED ORAL at 17:58

## 2024-02-19 RX ADMIN — LISINOPRIL 40 MG: 40 TABLET ORAL at 17:58

## 2024-02-19 RX ADMIN — Medication 10 ML: at 20:39

## 2024-02-19 RX ADMIN — KETOROLAC TROMETHAMINE 30 MG: 30 INJECTION, SOLUTION INTRAMUSCULAR; INTRAVENOUS at 17:58

## 2024-02-19 ASSESSMENT — PAIN DESCRIPTION - LOCATION
LOCATION: BACK
LOCATION: BACK;HEAD
LOCATION: BACK
LOCATION: BACK

## 2024-02-19 ASSESSMENT — PAIN DESCRIPTION - DESCRIPTORS
DESCRIPTORS: SHARP;STABBING
DESCRIPTORS: ACHING

## 2024-02-19 ASSESSMENT — PAIN DESCRIPTION - ORIENTATION
ORIENTATION: LOWER

## 2024-02-19 ASSESSMENT — PAIN DESCRIPTION - PAIN TYPE
TYPE: ACUTE PAIN
TYPE: ACUTE PAIN

## 2024-02-19 ASSESSMENT — PAIN DESCRIPTION - FREQUENCY: FREQUENCY: CONTINUOUS

## 2024-02-19 ASSESSMENT — PAIN SCALES - GENERAL
PAINLEVEL_OUTOF10: 5
PAINLEVEL_OUTOF10: 4
PAINLEVEL_OUTOF10: 5
PAINLEVEL_OUTOF10: 7
PAINLEVEL_OUTOF10: 0
PAINLEVEL_OUTOF10: 5
PAINLEVEL_OUTOF10: 9
PAINLEVEL_OUTOF10: 7
PAINLEVEL_OUTOF10: 5

## 2024-02-19 ASSESSMENT — PAIN SCALES - WONG BAKER: WONGBAKER_NUMERICALRESPONSE: 0

## 2024-02-19 ASSESSMENT — PAIN - FUNCTIONAL ASSESSMENT: PAIN_FUNCTIONAL_ASSESSMENT: 0-10

## 2024-02-19 ASSESSMENT — PAIN DESCRIPTION - ONSET: ONSET: SUDDEN

## 2024-02-19 ASSESSMENT — HEART SCORE: ECG: 1
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
## 2024-02-19 NOTE — PROGRESS NOTES
4 Eyes Skin Assessment     NAME:  Kvng Damon  YOB: 1975  MEDICAL RECORD NUMBER:  [id number]    The patient is being assessed for  Admission    I agree that at least one RN has performed a thorough Head to Toe Skin Assessment on the patient. ALL assessment sites listed below have been assessed.      Areas assessed by both nurses:    Head, Face, Ears, Shoulders, Back, Chest, Arms, Elbows, Hands, Sacrum. Buttock, Coccyx, Ischium, Legs. Feet and Heels, and Under Medical Devices         Does the Patient have a Wound? No noted wound(s)       Feliz Prevention initiated by RN: Yes  Wound Care Orders initiated by RN: No    Pressure Injury (Stage 3,4, Unstageable, DTI, NWPT, and Complex wounds) if present, place Wound referral order by RN under : No    New Ostomies, if present place, Ostomy referral order under : No     Nurse 1 eSignature: Electronically signed by Fabio Cancino RN on 2/19/24 at 5:17 PM EST    **SHARE this note so that the co-signing nurse can place an eSignature**    Nurse 2 eSignature: Electronically signed by Ann Marie Lamar RN on 2/19/24 at 6:30 PM EST
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
## 2024-02-19 NOTE — SUBJECTIVE & OBJECTIVE
Subjective:     ***    Objective:     Vitals:    02/19/24 1245 02/19/24 1615 02/19/24 1655 02/19/24 1718   BP: (!) 144/101 (!) 150/91 (!) 149/97    Pulse: 94 77 83    Resp: 21 17 17    Temp:   97.4 °F (36.3 °C)    TempSrc:   Oral    SpO2: 98% 97% 95%    Weight:    116.3 kg (256 lb 6.3 oz)   Height:    1.854 m (6' 1\")        Intake andOutput:  Current Shift: No intake/output data recorded.  Last three shifts: No intake/output data recorded.     Physical Exam      Medications:  Current Facility-Administered Medications   Medication Dose Route Frequency    lisinopril (PRINIVIL;ZESTRIL) tablet 40 mg  40 mg Oral Daily    levETIRAcetam (KEPPRA) tablet 750 mg  750 mg Oral BID    atorvastatin (LIPITOR) tablet 10 mg  10 mg Oral Daily    sodium chloride flush 0.9 % injection 10 mL  10 mL IntraVENous 2 times per day    sodium chloride flush 0.9 % injection 10 mL  10 mL IntraVENous PRN    0.9 % sodium chloride infusion   IntraVENous PRN    potassium chloride (KLOR-CON M) extended release tablet 40 mEq  40 mEq Oral PRN    Or    potassium bicarb-citric acid (EFFER-K) effervescent tablet 40 mEq  40 mEq Oral PRN    Or    potassium chloride 10 mEq/100 mL IVPB (Peripheral Line)  10 mEq IntraVENous PRN    magnesium sulfate 2000 mg in 50 mL IVPB premix  2,000 mg IntraVENous PRN    enoxaparin Sodium (LOVENOX) injection 30 mg  30 mg SubCUTAneous BID    ondansetron (ZOFRAN) injection 4 mg  4 mg IntraVENous Q4H PRN    polyethylene glycol (GLYCOLAX) packet 17 g  17 g Oral Daily PRN    acetaminophen (TYLENOL) tablet 650 mg  650 mg Oral Q4H PRN    Or    acetaminophen (TYLENOL) suppository 650 mg  650 mg Rectal Q4H PRN    regadenoson (LEXISCAN) injection 0.4 mg  0.4 mg IntraVENous ONCE PRN    ketorolac (TORADOL) injection 30 mg  30 mg IntraVENous Q6H PRN    dexAMETHasone (PF) (DECADRON) injection 10 mg  10 mg IntraVENous Once        Medications Reviewed    :

## 2024-02-19 NOTE — FLOWSHEET NOTE
Pt to room 5118 via EMS from [place]. Vitals listed below. Pt complaining of lower back pain at this Dr. Donato at bedside. Pt ambulated in room as CGA. Oriented to room and call light. Fall precautions in place. 4 eyes and assessment initiated. Pt connected to telebox #19; [facility] called and confirmed pt and NSR. Pt has no expressed needs at this time. Care plan ongoing.    02/19/24 1655   Vital Signs   Temp 97.4 °F (36.3 °C)   Temp Source Oral   Pulse 83   Heart Rate Source Monitor   Respirations 17   BP (!) 149/97   MAP (Calculated) 114   BP Location Right upper arm   BP Method Automatic   Patient Position Semi fowlers   Pain Assessment   Pain Assessment 0-10   Pain Level 7   Pain Location Back   Pain Orientation Lower   Pain Descriptors Aching   Pain Type Acute pain   Oxygen Therapy   SpO2 95 %   Pulse Oximetry Type Intermittent   Pulse Oximeter Device Mode Intermittent   Pulse Oximeter Device Location Finger
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
## 2024-02-20 VITALS
RESPIRATION RATE: 18 BRPM | HEART RATE: 68 BPM | HEIGHT: 73 IN | WEIGHT: 263.23 LBS | BODY MASS INDEX: 34.89 KG/M2 | OXYGEN SATURATION: 98 % | TEMPERATURE: 97.6 F | DIASTOLIC BLOOD PRESSURE: 96 MMHG | SYSTOLIC BLOOD PRESSURE: 142 MMHG

## 2024-02-20 LAB
ANION GAP SERPL CALCULATED.3IONS-SCNC: 10 MMOL/L (ref 3–16)
BASOPHILS # BLD: 0 K/UL (ref 0–0.2)
BASOPHILS NFR BLD: 0.1 %
BUN SERPL-MCNC: 12 MG/DL (ref 7–20)
CALCIUM SERPL-MCNC: 9.6 MG/DL (ref 8.3–10.6)
CHLORIDE SERPL-SCNC: 103 MMOL/L (ref 99–110)
CO2 SERPL-SCNC: 24 MMOL/L (ref 21–32)
CREAT SERPL-MCNC: 0.8 MG/DL (ref 0.9–1.3)
DEPRECATED RDW RBC AUTO: 12.7 % (ref 12.4–15.4)
EOSINOPHIL # BLD: 0 K/UL (ref 0–0.6)
EOSINOPHIL NFR BLD: 0 %
GFR SERPLBLD CREATININE-BSD FMLA CKD-EPI: >60 ML/MIN/{1.73_M2}
GLUCOSE SERPL-MCNC: 149 MG/DL (ref 70–99)
HCT VFR BLD AUTO: 44.1 % (ref 40.5–52.5)
HGB BLD-MCNC: 15.2 G/DL (ref 13.5–17.5)
LYMPHOCYTES # BLD: 0.5 K/UL (ref 1–5.1)
LYMPHOCYTES NFR BLD: 7.9 %
MCH RBC QN AUTO: 31.4 PG (ref 26–34)
MCHC RBC AUTO-ENTMCNC: 34.5 G/DL (ref 31–36)
MCV RBC AUTO: 91 FL (ref 80–100)
MONOCYTES # BLD: 0.2 K/UL (ref 0–1.3)
MONOCYTES NFR BLD: 2.6 %
NEUTROPHILS # BLD: 5.5 K/UL (ref 1.7–7.7)
NEUTROPHILS NFR BLD: 89.4 %
PLATELET # BLD AUTO: 219 K/UL (ref 135–450)
PMV BLD AUTO: 9.8 FL (ref 5–10.5)
POTASSIUM SERPL-SCNC: 4.5 MMOL/L (ref 3.5–5.1)
RBC # BLD AUTO: 4.85 M/UL (ref 4.2–5.9)
SLIDE REVIEW: ABNORMAL
SODIUM SERPL-SCNC: 137 MMOL/L (ref 136–145)
WBC # BLD AUTO: 6.2 K/UL (ref 4–11)

## 2024-02-20 PROCEDURE — 85025 COMPLETE CBC W/AUTO DIFF WBC: CPT

## 2024-02-20 PROCEDURE — 3430000000 HC RX DIAGNOSTIC RADIOPHARMACEUTICAL: Performed by: INTERNAL MEDICINE

## 2024-02-20 PROCEDURE — 80048 BASIC METABOLIC PNL TOTAL CA: CPT

## 2024-02-20 PROCEDURE — 2580000003 HC RX 258: Performed by: INTERNAL MEDICINE

## 2024-02-20 PROCEDURE — A9502 TC99M TETROFOSMIN: HCPCS | Performed by: INTERNAL MEDICINE

## 2024-02-20 PROCEDURE — 78452 HT MUSCLE IMAGE SPECT MULT: CPT

## 2024-02-20 PROCEDURE — 93017 CV STRESS TEST TRACING ONLY: CPT

## 2024-02-20 PROCEDURE — 6370000000 HC RX 637 (ALT 250 FOR IP): Performed by: INTERNAL MEDICINE

## 2024-02-20 PROCEDURE — 6360000002 HC RX W HCPCS: Performed by: INTERNAL MEDICINE

## 2024-02-20 PROCEDURE — 36415 COLL VENOUS BLD VENIPUNCTURE: CPT

## 2024-02-20 RX ORDER — REGADENOSON 0.08 MG/ML
0.4 INJECTION, SOLUTION INTRAVENOUS
Status: DISCONTINUED | OUTPATIENT
Start: 2024-02-20 | End: 2024-02-20 | Stop reason: SDUPTHER

## 2024-02-20 RX ADMIN — LISINOPRIL 40 MG: 40 TABLET ORAL at 10:36

## 2024-02-20 RX ADMIN — ACETAMINOPHEN 650 MG: 325 TABLET ORAL at 10:36

## 2024-02-20 RX ADMIN — ENOXAPARIN SODIUM 30 MG: 100 INJECTION SUBCUTANEOUS at 10:37

## 2024-02-20 RX ADMIN — REGADENOSON 0.4 MG: 0.08 INJECTION, SOLUTION INTRAVENOUS at 08:36

## 2024-02-20 RX ADMIN — LEVETIRACETAM 750 MG: 500 TABLET, FILM COATED ORAL at 10:35

## 2024-02-20 RX ADMIN — TETROFOSMIN 10 MILLICURIE: 1.38 INJECTION, POWDER, LYOPHILIZED, FOR SOLUTION INTRAVENOUS at 07:20

## 2024-02-20 RX ADMIN — Medication 10 ML: at 10:38

## 2024-02-20 RX ADMIN — TETROFOSMIN 30 MILLICURIE: 1.38 INJECTION, POWDER, LYOPHILIZED, FOR SOLUTION INTRAVENOUS at 08:43

## 2024-02-20 ASSESSMENT — PAIN SCALES - GENERAL: PAINLEVEL_OUTOF10: 5

## 2024-02-20 ASSESSMENT — PAIN SCALES - WONG BAKER: WONGBAKER_NUMERICALRESPONSE: 0

## 2024-02-20 NOTE — PROGRESS NOTES
Patient rested in bed during the night. Pt needed some encouragement to go to the restroom. Pt voided with no issues once in the restroom. Pt was NPO since midnight. Call light within reach.    Electronically signed by Radha Arenas RN on 2/20/2024 at 7:09 AM

## 2024-02-20 NOTE — PROGRESS NOTES
Discharge order noted by this RN. Pt verbalized he will not have a ride for a few hours. Electronically signed by Fabio Cancino RN on 2/20/24 at 11:54 AM EST     Discharge teaching completed with pt and family. AVS reviewed and all questions answered. Medication regimen reviewed and pt understands schedule. Pt educated on importance of following up with PCP and neurologist. Pt verbalized he does not remember who his PCP is so this RN provided a list of PCPs. IV removed with no complications. Bedside monitor removed from pt and returned to [facility]. 60+ minutes of education completed. Required core measures completed. Pt vitals WDL. Pt discharged with all belongings to home with mother. Pt ambulated independently off unit per pt request. Pt left with cell phone, headphones, wallet, clothing, and shoes. No complications. Electronically signed by Fabio Cancino RN on 2/20/24 at 1:37 PM EST
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
## 2024-02-20 NOTE — PROGRESS NOTES
Medication Reconciliation    List of medications patient is currently taking is complete.     Source of information: 1. Conversation with patient at bedside                                      2. EPIC records        Notes regarding home medications:   1. Atorvastatin removed from list.         Chante Cosme [facility], PharmD, 2/20/2024 10:27 AM

## 2024-02-20 NOTE — CARE COORDINATION
needed at discharge: N/A            Potential DME:  none  Patient expects to discharge to: House  Plan for transportation at discharge: Family    Financial    Payor: BCBS / Plan: BCBS OUT OF STATE / Product Type: *No Product type* /     Does insurance require precert for SNF: Yes    Potential assistance Purchasing Medications: No  Meds-to-Beds request:        [id number] - [place], [place] - [address] - P [phone] - F [phone] [address] [address]  Phone: [phone] Fax: [phone]      Notes:    Factors facilitating achievement of predicted outcomes: Family support, Cooperative, Pleasant, Sense of humor, and Good insight into deficits    Barriers to discharge: Long standing deficits    Additional Case Management Notes:   DC to home with son.  Patient has transport.  Denied any needs or resources.      The Plan for Transition of Care is related to the following treatment goals of Substance abuse (HCC) [F19.10]  Elevated troponin [R79.89]  Goals of care, counseling/discussion [Z71.89]  Breakthrough seizure (HCC) [G40.919]  Chest pain, unspecified type [R07.9]    The Patient and/or patient representative Kvng and his family were provided with a choice of provider and agrees with the discharge plan. Freedom of choice list with basic dialogue that supports the patient's individualized plan of care/goals and shares the quality data associated with the providers was provided to: Patient          The Patient and/or Patient Representative Agree with the Discharge Plan? Yes    Abimbola Mix RN  Case Management Department  Ph: [phone]

## 2024-03-20 PROBLEM — R79.89 ELEVATED TROPONIN: Status: RESOLVED | Noted: 2024-02-19 | Resolved: 2024-03-20

## 2024-07-31 NOTE — LETTER
Head, normocephalic, atraumatic, Face, Face within normal limits, Ears, External ears within normal limits, Nose/Nasopharynx, External nose normal appearance, nares patent, no nasal discharge, Mouth and Throat, Oral cavity appearance normal, Lips, Appearance normal [facility] [address]  Phone: [phone]               November 1, 2022    Patient: Flip Mayen   YOB: 1975   Date of Visit: 11/1/2022       To Whom It May Concern:    Farhan Degroot was seen and treated in our emergency department on 11/1/2022. He may return to work on Nov 7th. .      Sincerely,       Katerin Saini RN         Signature:__________________________________

## 2024-12-01 ENCOUNTER — APPOINTMENT (OUTPATIENT)
Dept: CT IMAGING | Age: 49
End: 2024-12-01
Attending: EMERGENCY MEDICINE
Payer: COMMERCIAL

## 2024-12-01 ENCOUNTER — HOSPITAL ENCOUNTER (EMERGENCY)
Age: 49
Discharge: HOME | End: 2024-12-01
Attending: EMERGENCY MEDICINE
Payer: COMMERCIAL

## 2024-12-01 VITALS
TEMPERATURE: 98.4 F | BODY MASS INDEX: 33.16 KG/M2 | RESPIRATION RATE: 20 BRPM | HEART RATE: 102 BPM | HEIGHT: 73 IN | OXYGEN SATURATION: 96 % | SYSTOLIC BLOOD PRESSURE: 109 MMHG | WEIGHT: 250.22 LBS | DIASTOLIC BLOOD PRESSURE: 72 MMHG

## 2024-12-01 DIAGNOSIS — S01.81XA LACERATION OF FOREHEAD, INITIAL ENCOUNTER: ICD-10-CM

## 2024-12-01 DIAGNOSIS — S09.90XA CLOSED HEAD INJURY, INITIAL ENCOUNTER: ICD-10-CM

## 2024-12-01 DIAGNOSIS — G40.919 BREAKTHROUGH SEIZURE (HCC): Primary | ICD-10-CM

## 2024-12-01 LAB
ALBUMIN SERPL-MCNC: 4.3 G/DL (ref 3.4–5)
ALBUMIN/GLOB SERPL: 1.6 {RATIO} (ref 1.1–2.2)
ALP SERPL-CCNC: 98 U/L (ref 40–129)
ALT SERPL-CCNC: 43 U/L (ref 10–40)
ANION GAP SERPL CALCULATED.3IONS-SCNC: 19 MMOL/L (ref 3–16)
AST SERPL-CCNC: 36 U/L (ref 15–37)
BASE EXCESS BLDV CALC-SCNC: -1.1 MMOL/L (ref -3–3)
BASOPHILS # BLD: 0 K/UL (ref 0–0.2)
BASOPHILS NFR BLD: 0.2 %
BILIRUB SERPL-MCNC: 0.4 MG/DL (ref 0–1)
BUN SERPL-MCNC: 14 MG/DL (ref 7–20)
CALCIUM SERPL-MCNC: 10.3 MG/DL (ref 8.3–10.6)
CHLORIDE SERPL-SCNC: 102 MMOL/L (ref 99–110)
CO2 BLDV-SCNC: 23 MMOL/L
CO2 SERPL-SCNC: 20 MMOL/L (ref 21–32)
CREAT SERPL-MCNC: 0.8 MG/DL (ref 0.9–1.3)
DEPRECATED RDW RBC AUTO: 13 % (ref 12.4–15.4)
EOSINOPHIL # BLD: 0 K/UL (ref 0–0.6)
EOSINOPHIL NFR BLD: 0.1 %
GFR SERPLBLD CREATININE-BSD FMLA CKD-EPI: >90 ML/MIN/{1.73_M2}
GLUCOSE BLD-MCNC: 142 MG/DL (ref 70–99)
GLUCOSE SERPL-MCNC: 118 MG/DL (ref 70–99)
HCO3 BLDV-SCNC: 23.4 MMOL/L (ref 23–29)
HCT VFR BLD AUTO: 44.2 % (ref 40.5–52.5)
HGB BLD-MCNC: 15.2 G/DL (ref 13.5–17.5)
LIPASE SERPL-CCNC: 31 U/L (ref 13–60)
LYMPHOCYTES # BLD: 0.6 K/UL (ref 1–5.1)
LYMPHOCYTES NFR BLD: 4.6 %
MAGNESIUM SERPL-MCNC: 2.5 MG/DL (ref 1.8–2.4)
MCH RBC QN AUTO: 31.5 PG (ref 26–34)
MCHC RBC AUTO-ENTMCNC: 34.4 G/DL (ref 31–36)
MCV RBC AUTO: 91.6 FL (ref 80–100)
MONOCYTES # BLD: 0.9 K/UL (ref 0–1.3)
MONOCYTES NFR BLD: 6.3 %
NEUTROPHILS # BLD: 12.6 K/UL (ref 1.7–7.7)
NEUTROPHILS NFR BLD: 88.8 %
O2 THERAPY: ABNORMAL
PCO2 BLDV: 36.4 MMHG (ref 40–50)
PERFORMED ON: ABNORMAL
PH BLDV: 7.42 [PH] (ref 7.35–7.45)
PLATELET # BLD AUTO: 254 K/UL (ref 135–450)
PMV BLD AUTO: 9 FL (ref 5–10.5)
PO2 BLDV: 63.8 MMHG (ref 25–40)
POTASSIUM SERPL-SCNC: 4.4 MMOL/L (ref 3.5–5.1)
PROT SERPL-MCNC: 7 G/DL (ref 6.4–8.2)
RBC # BLD AUTO: 4.82 M/UL (ref 4.2–5.9)
SAO2 % BLDV: 93 %
SODIUM SERPL-SCNC: 141 MMOL/L (ref 136–145)
TROPONIN, HIGH SENSITIVITY: 20 NG/L (ref 0–22)
WBC # BLD AUTO: 14.1 K/UL (ref 4–11)

## 2024-12-01 PROCEDURE — 6360000004 HC RX CONTRAST MEDICATION: Performed by: EMERGENCY MEDICINE

## 2024-12-01 PROCEDURE — 74177 CT ABD & PELVIS W/CONTRAST: CPT

## 2024-12-01 PROCEDURE — 96374 THER/PROPH/DIAG INJ IV PUSH: CPT

## 2024-12-01 PROCEDURE — 83690 ASSAY OF LIPASE: CPT

## 2024-12-01 PROCEDURE — 36415 COLL VENOUS BLD VENIPUNCTURE: CPT

## 2024-12-01 PROCEDURE — 80053 COMPREHEN METABOLIC PANEL: CPT

## 2024-12-01 PROCEDURE — 83735 ASSAY OF MAGNESIUM: CPT

## 2024-12-01 PROCEDURE — 6370000000 HC RX 637 (ALT 250 FOR IP): Performed by: EMERGENCY MEDICINE

## 2024-12-01 PROCEDURE — 82803 BLOOD GASES ANY COMBINATION: CPT

## 2024-12-01 PROCEDURE — 71260 CT THORAX DX C+: CPT

## 2024-12-01 PROCEDURE — 6370000000 HC RX 637 (ALT 250 FOR IP): Performed by: PHYSICIAN ASSISTANT

## 2024-12-01 PROCEDURE — 6360000002 HC RX W HCPCS: Performed by: EMERGENCY MEDICINE

## 2024-12-01 PROCEDURE — 12014 RPR F/E/E/N/L/M 5.1-7.5 CM: CPT

## 2024-12-01 PROCEDURE — 85025 COMPLETE CBC W/AUTO DIFF WBC: CPT

## 2024-12-01 PROCEDURE — 93005 ELECTROCARDIOGRAM TRACING: CPT | Performed by: EMERGENCY MEDICINE

## 2024-12-01 PROCEDURE — 72125 CT NECK SPINE W/O DYE: CPT

## 2024-12-01 PROCEDURE — 99285 EMERGENCY DEPT VISIT HI MDM: CPT

## 2024-12-01 PROCEDURE — 70450 CT HEAD/BRAIN W/O DYE: CPT

## 2024-12-01 PROCEDURE — 84484 ASSAY OF TROPONIN QUANT: CPT

## 2024-12-01 RX ORDER — CHLORDIAZEPOXIDE HYDROCHLORIDE 25 MG/1
25 CAPSULE, GELATIN COATED ORAL ONCE
Status: DISCONTINUED | OUTPATIENT
Start: 2024-12-01 | End: 2024-12-01

## 2024-12-01 RX ORDER — IBUPROFEN 800 MG/1
800 TABLET, FILM COATED ORAL ONCE
Status: COMPLETED | OUTPATIENT
Start: 2024-12-01 | End: 2024-12-01

## 2024-12-01 RX ORDER — DIAZEPAM 5 MG/1
10 TABLET ORAL ONCE
Status: COMPLETED | OUTPATIENT
Start: 2024-12-01 | End: 2024-12-01

## 2024-12-01 RX ORDER — LIDOCAINE 4 G/G
2 PATCH TOPICAL ONCE
Status: DISCONTINUED | OUTPATIENT
Start: 2024-12-01 | End: 2024-12-01 | Stop reason: HOSPADM

## 2024-12-01 RX ORDER — THIAMINE HYDROCHLORIDE 100 MG/ML
100 INJECTION, SOLUTION INTRAMUSCULAR; INTRAVENOUS DAILY
Status: DISCONTINUED | OUTPATIENT
Start: 2024-12-01 | End: 2024-12-01 | Stop reason: HOSPADM

## 2024-12-01 RX ORDER — NALTREXONE HYDROCHLORIDE 50 MG/1
1 TABLET, FILM COATED ORAL DAILY
COMMUNITY
Start: 2024-10-18

## 2024-12-01 RX ORDER — ACETAMINOPHEN 500 MG
1000 TABLET ORAL ONCE
Status: COMPLETED | OUTPATIENT
Start: 2024-12-01 | End: 2024-12-01

## 2024-12-01 RX ORDER — LIDOCAINE HYDROCHLORIDE AND EPINEPHRINE BITARTRATE 20; .01 MG/ML; MG/ML
20 INJECTION, SOLUTION SUBCUTANEOUS ONCE
Status: DISCONTINUED | OUTPATIENT
Start: 2024-12-01 | End: 2024-12-01 | Stop reason: HOSPADM

## 2024-12-01 RX ADMIN — IOMEPROL INJECTION 100 ML: 714 INJECTION, SOLUTION INTRAVASCULAR at 15:22

## 2024-12-01 RX ADMIN — THIAMINE HYDROCHLORIDE 100 MG: 100 INJECTION, SOLUTION INTRAMUSCULAR; INTRAVENOUS at 16:14

## 2024-12-01 RX ADMIN — IBUPROFEN 800 MG: 800 TABLET, FILM COATED ORAL at 17:03

## 2024-12-01 RX ADMIN — ACETAMINOPHEN 1000 MG: 500 TABLET ORAL at 17:04

## 2024-12-01 RX ADMIN — DIAZEPAM 10 MG: 5 TABLET ORAL at 16:14

## 2024-12-01 ASSESSMENT — PAIN DESCRIPTION - LOCATION
LOCATION: HEAD
LOCATION: HEAD;BACK;GENERALIZED
LOCATION: HEAD;GENERALIZED

## 2024-12-01 ASSESSMENT — PAIN - FUNCTIONAL ASSESSMENT
PAIN_FUNCTIONAL_ASSESSMENT: ACTIVITIES ARE NOT PREVENTED
PAIN_FUNCTIONAL_ASSESSMENT: 0-10
PAIN_FUNCTIONAL_ASSESSMENT: ACTIVITIES ARE NOT PREVENTED
PAIN_FUNCTIONAL_ASSESSMENT: 0-10
PAIN_FUNCTIONAL_ASSESSMENT: ACTIVITIES ARE NOT PREVENTED

## 2024-12-01 ASSESSMENT — PAIN DESCRIPTION - DESCRIPTORS
DESCRIPTORS: ACHING
DESCRIPTORS: ACHING
DESCRIPTORS: ACHING;DISCOMFORT

## 2024-12-01 ASSESSMENT — PAIN SCALES - GENERAL
PAINLEVEL_OUTOF10: 5
PAINLEVEL_OUTOF10: 7
PAINLEVEL_OUTOF10: 5

## 2024-12-01 ASSESSMENT — PAIN DESCRIPTION - ONSET
ONSET: SUDDEN
ONSET: ON-GOING

## 2024-12-01 ASSESSMENT — PAIN DESCRIPTION - PAIN TYPE
TYPE: ACUTE PAIN
TYPE: ACUTE PAIN

## 2024-12-01 ASSESSMENT — PAIN DESCRIPTION - ORIENTATION: ORIENTATION: UPPER

## 2024-12-01 NOTE — ED NOTES
Pt's SaO2 noticed to drift to 86% on RA BBS clear pt states he has had a little congestion lately.

## 2024-12-01 NOTE — ED NOTES
Fall risk screening completed.  Fall risk bracelet applied to patient.  Non-skid socks provided and placed on patient.  The fall risk is indicated using  fall sign .  Based on score, a bed alarm was indicated and applied.  The call light is within the patient's reach, and instructions for use were provided.  The bed is in the lowest position with wheels locked.  The patient has been advised to notify staff, using the call light, if there is a need to get up or use restroom.  The patient verbalized understanding of safety precautions and how to contact staff for assistance.

## 2024-12-01 NOTE — ED PROVIDER NOTES
heart   dysfunction or as normal variation.   5. Mild bronchial wall thickening potentially due to pulmonary vascular   congestion, reactive airways disease, or bronchitis.   6. Possible findings of mild cystitis, although the appearance is more likely   related to incomplete distention and changes of chronic outlet obstruction   with the area also obscured by artifact from a left hip prosthesis.   7. A few additional incidental findings as above for which no dedicated   follow-up is recommended.         CT LUMBAR SPINE BONY RECONSTRUCTION   Final Result   1. No evident acute injury involving the chest, abdomen, or pelvis.   Specifically, no acute findings in the thoracic spine or lumbar spine.   2. No findings of pulmonary embolism.   3. Cardiovascular findings that can be seen with pulmonary hypertension.   4. Slight reflux of the contrast bolus that can be seen with right heart   dysfunction or as normal variation.   5. Mild bronchial wall thickening potentially due to pulmonary vascular   congestion, reactive airways disease, or bronchitis.   6. Possible findings of mild cystitis, although the appearance is more likely   related to incomplete distention and changes of chronic outlet obstruction   with the area also obscured by artifact from a left hip prosthesis.   7. A few additional incidental findings as above for which no dedicated   follow-up is recommended.         CT HEAD WO CONTRAST   Final Result   No acute intracranial abnormality is identified.         CT CERVICAL SPINE WO CONTRAST   Final Result   No acute abnormality of the cervical spine.           Plan:  On reevaluation he does not appear acutely ill  VS have essentially normalized  He states that his pain has improved and is well-controlled now  Imaging studies are negative for significant, overt, acute, traumatic injuries  Following negative imaging of his cervical spine he is able to move his head and neck through full active range of motion 
Tyrone GIVENS MD    Consent:     Consent obtained:  Verbal    Consent given by:  Patient    Risks, benefits, and alternatives were discussed: yes    Anesthesia:     Anesthesia method:  Local infiltration    Local anesthetic:  Lidocaine 2% WITH epi  Laceration details:     Location:  Face    Face location:  Forehead    Length (cm):  6    Depth (mm):  4  Pre-procedure details:     Preparation:  Patient was prepped and draped in usual sterile fashion  Exploration:     Hemostasis achieved with:  Epinephrine    Imaging outcome: foreign body not noted    Treatment:     Area cleansed with:  Saline    Amount of cleaning:  Standard    Irrigation solution:  Sterile saline    Irrigation method:  Syringe  Skin repair:     Repair method:  Sutures    Suture size:  5-0 and 6-0    Suture material:  Nylon    Suture technique:  Running  Approximation:     Approximation:  Loose  Repair type:     Repair type:  Simple  Post-procedure details:     Procedure completion:  Tolerated well, no immediate complications      CRITICAL CARE TIME (.cctime)       PAST MEDICAL HISTORY      has a past medical history of Cerebral artery occlusion with cerebral infarction (HCC), Hyperlipidemia, Hypertension, Obesity, and Seizures (HCC).     Chronic Conditions affecting Care:     EMERGENCY DEPARTMENT COURSE and DIFFERENTIAL DIAGNOSIS/MDM:   Vitals:    Vitals:    12/01/24 1700 12/01/24 1702 12/01/24 1703 12/01/24 1704   BP:       Pulse: (!) 103 (!) 101 100 (!) 102   Resp: 19 22 18 20   Temp:       SpO2: 95% 95% 93% 96%   Weight:       Height:           Patient was given the following medications:  Medications   thiamine (B-1) injection 100 mg (100 mg IntraVENous Given 12/1/24 1614)   lidocaine 4 % external patch 2 patch (2 patches TransDERmal Patch Applied 12/1/24 1459)   lidocaine-EPINEPHrine 2%-1:128822 injection 20 mL (20 mLs IntraDERmal Handoff 12/1/24 1658)   iomeprol (IOMERON 350) 71.44 % injection 100 mL (100 mLs IntraVENous Given 12/1/24 1522)

## 2024-12-01 NOTE — DISCHARGE INSTRUCTIONS
together.    If your caregiver wants to wait before closing your wound: Sometimes a wound has to be left open for a few days before it is sutured (sewn) shut by your caregiver. This is called delayed closure. Ask your caregiver for more information about wound care for an open wound, or a wound that is packed with gauze.    If you have sutures (stitches) or staples:     Most wounds may be kept covered with a non-stick, sterile dressing for the first 24 to 48 hours after treatment. After that, you should gently clean the area as described above. Then, keep the area as dry and clean as possible. Your caregiver may want you to apply antibiotic ointment to the sutures or staples. Ask your caregiver if you should re-cover the area with a clean dressing, or leave it open to air.    Ask your caregiver when you should have your sutures or staples removed. They may need to stay in for five days to three weeks or longer, depending on where the wound is. Sutures on your face may need to be removed sooner. A caregiver will need to check your wound before the sutures or staples are removed. Some sutures will be absorbed by your body and do not need to be removed.     Decrease your chance of scarring: A scar is the joe that stays on your skin after a wound heals. Using antibiotic or other ointment on your wound may decrease the amount of scarring that you have. Ask your caregiver what ointment to buy, and how often to use it. The skin of your wound area may turn a different color if it is exposed to direct sunlight. After your wound is healed, use sunscreen over the area when you are out in the sun. You should do this for at least six months to one year after your injury. Some wounds scar less if they are covered while they heal. Ask your caregiver if your wound should be covered with a clean bandage, or left open to air.   CONTACT A CAREGIVER IF:   You have a shaking, chills, fever, or any other signs of infection. Signs of

## 2024-12-02 LAB
EKG ATRIAL RATE: 117 BPM
EKG DIAGNOSIS: NORMAL
EKG P AXIS: 13 DEGREES
EKG P-R INTERVAL: 168 MS
EKG Q-T INTERVAL: 330 MS
EKG QRS DURATION: 94 MS
EKG QTC CALCULATION (BAZETT): 460 MS
EKG R AXIS: -1 DEGREES
EKG T AXIS: 26 DEGREES
EKG VENTRICULAR RATE: 117 BPM

## 2024-12-02 PROCEDURE — 93010 ELECTROCARDIOGRAM REPORT: CPT | Performed by: INTERNAL MEDICINE

## 2025-01-02 ENCOUNTER — APPOINTMENT (OUTPATIENT)
Dept: CT IMAGING | Age: 50
End: 2025-01-02
Payer: COMMERCIAL

## 2025-01-02 ENCOUNTER — HOSPITAL ENCOUNTER (EMERGENCY)
Age: 50
Discharge: HOME | End: 2025-01-03
Payer: COMMERCIAL

## 2025-01-02 ENCOUNTER — APPOINTMENT (OUTPATIENT)
Dept: GENERAL RADIOLOGY | Age: 50
End: 2025-01-02
Payer: COMMERCIAL

## 2025-01-02 VITALS
SYSTOLIC BLOOD PRESSURE: 160 MMHG | TEMPERATURE: 97.9 F | HEART RATE: 97 BPM | OXYGEN SATURATION: 100 % | RESPIRATION RATE: 16 BRPM | DIASTOLIC BLOOD PRESSURE: 89 MMHG

## 2025-01-02 DIAGNOSIS — S09.90XA INJURY OF HEAD, INITIAL ENCOUNTER: ICD-10-CM

## 2025-01-02 DIAGNOSIS — Z79.899 SEIZURE SECONDARY TO SUBTHERAPEUTIC ANTICONVULSANT MEDICATION (HCC): ICD-10-CM

## 2025-01-02 DIAGNOSIS — R56.9 SEIZURE SECONDARY TO SUBTHERAPEUTIC ANTICONVULSANT MEDICATION (HCC): ICD-10-CM

## 2025-01-02 DIAGNOSIS — F10.10 ALCOHOL ABUSE: ICD-10-CM

## 2025-01-02 DIAGNOSIS — R56.9 SEIZURE (HCC): Primary | ICD-10-CM

## 2025-01-02 DIAGNOSIS — R79.89 ELEVATED LFTS: ICD-10-CM

## 2025-01-02 LAB
ALBUMIN SERPL-MCNC: 4.8 G/DL (ref 3.4–5)
ALBUMIN/GLOB SERPL: 1.7 {RATIO} (ref 1.1–2.2)
ALP SERPL-CCNC: 98 U/L (ref 40–129)
ALT SERPL-CCNC: 54 U/L (ref 10–40)
ANION GAP SERPL CALCULATED.3IONS-SCNC: 20 MMOL/L (ref 3–16)
AST SERPL-CCNC: 69 U/L (ref 15–37)
BACTERIA URNS QL MICRO: ABNORMAL /HPF
BASOPHILS # BLD: 0 K/UL (ref 0–0.2)
BASOPHILS NFR BLD: 0.1 %
BILIRUB SERPL-MCNC: 0.7 MG/DL (ref 0–1)
BILIRUB UR QL STRIP.AUTO: NEGATIVE
BUN SERPL-MCNC: 14 MG/DL (ref 7–20)
CALCIUM SERPL-MCNC: 9.3 MG/DL (ref 8.3–10.6)
CHLORIDE SERPL-SCNC: 94 MMOL/L (ref 99–110)
CLARITY UR: ABNORMAL
CO2 SERPL-SCNC: 18 MMOL/L (ref 21–32)
COLOR UR: YELLOW
CREAT SERPL-MCNC: 1.1 MG/DL (ref 0.9–1.3)
DEPRECATED RDW RBC AUTO: 13.5 % (ref 12.4–15.4)
EOSINOPHIL # BLD: 0 K/UL (ref 0–0.6)
EOSINOPHIL NFR BLD: 0 %
EPI CELLS #/AREA URNS AUTO: 0 /HPF (ref 0–5)
GFR SERPLBLD CREATININE-BSD FMLA CKD-EPI: 81 ML/MIN/{1.73_M2}
GLUCOSE SERPL-MCNC: 143 MG/DL (ref 70–99)
GLUCOSE UR STRIP.AUTO-MCNC: NEGATIVE MG/DL
HCT VFR BLD AUTO: 44.4 % (ref 40.5–52.5)
HGB BLD-MCNC: 15.4 G/DL (ref 13.5–17.5)
HGB UR QL STRIP.AUTO: ABNORMAL
HYALINE CASTS #/AREA URNS AUTO: 0 /LPF (ref 0–8)
KETONES UR STRIP.AUTO-MCNC: ABNORMAL MG/DL
LEUKOCYTE ESTERASE UR QL STRIP.AUTO: NEGATIVE
LEVETIRACETAM SERPL-MCNC: <2 UG/ML (ref 6–46)
LYMPHOCYTES # BLD: 0.7 K/UL (ref 1–5.1)
LYMPHOCYTES NFR BLD: 4.2 %
MCH RBC QN AUTO: 31.3 PG (ref 26–34)
MCHC RBC AUTO-ENTMCNC: 34.8 G/DL (ref 31–36)
MCV RBC AUTO: 90.1 FL (ref 80–100)
MEDICATION DOSE-MCNC: ABNORMAL
MONOCYTES # BLD: 1.2 K/UL (ref 0–1.3)
MONOCYTES NFR BLD: 7.3 %
NEUTROPHILS # BLD: 14.6 K/UL (ref 1.7–7.7)
NEUTROPHILS NFR BLD: 88.4 %
NITRITE UR QL STRIP.AUTO: NEGATIVE
PH UR STRIP.AUTO: 5.5 [PH] (ref 5–8)
PLATELET # BLD AUTO: 234 K/UL (ref 135–450)
PMV BLD AUTO: 9.2 FL (ref 5–10.5)
POTASSIUM SERPL-SCNC: 3.7 MMOL/L (ref 3.5–5.1)
PROT SERPL-MCNC: 7.7 G/DL (ref 6.4–8.2)
PROT UR STRIP.AUTO-MCNC: 100 MG/DL
RBC # BLD AUTO: 4.93 M/UL (ref 4.2–5.9)
RBC CLUMPS #/AREA URNS AUTO: 0 /HPF (ref 0–4)
SODIUM SERPL-SCNC: 132 MMOL/L (ref 136–145)
SP GR UR STRIP.AUTO: 1.01 (ref 1–1.03)
UA COMPLETE W REFLEX CULTURE PNL UR: ABNORMAL
UA DIPSTICK W REFLEX MICRO PNL UR: YES
URIC ACID CRYSTALS: PRESENT
URN SPEC COLLECT METH UR: ABNORMAL
UROBILINOGEN UR STRIP-ACNC: 0.2 E.U./DL
WBC # BLD AUTO: 16.5 K/UL (ref 4–11)
WBC #/AREA URNS AUTO: 0 /HPF (ref 0–5)

## 2025-01-02 PROCEDURE — 85025 COMPLETE CBC W/AUTO DIFF WBC: CPT

## 2025-01-02 PROCEDURE — 71045 X-RAY EXAM CHEST 1 VIEW: CPT

## 2025-01-02 PROCEDURE — 96375 TX/PRO/DX INJ NEW DRUG ADDON: CPT

## 2025-01-02 PROCEDURE — 70450 CT HEAD/BRAIN W/O DYE: CPT

## 2025-01-02 PROCEDURE — 2580000003 HC RX 258: Performed by: PHYSICIAN ASSISTANT

## 2025-01-02 PROCEDURE — 6360000002 HC RX W HCPCS: Performed by: PHYSICIAN ASSISTANT

## 2025-01-02 PROCEDURE — 72125 CT NECK SPINE W/O DYE: CPT

## 2025-01-02 PROCEDURE — 99284 EMERGENCY DEPT VISIT MOD MDM: CPT

## 2025-01-02 PROCEDURE — 81001 URINALYSIS AUTO W/SCOPE: CPT

## 2025-01-02 PROCEDURE — 96374 THER/PROPH/DIAG INJ IV PUSH: CPT

## 2025-01-02 PROCEDURE — 80053 COMPREHEN METABOLIC PANEL: CPT

## 2025-01-02 PROCEDURE — 80177 DRUG SCRN QUAN LEVETIRACETAM: CPT

## 2025-01-02 PROCEDURE — 6370000000 HC RX 637 (ALT 250 FOR IP): Performed by: PHYSICIAN ASSISTANT

## 2025-01-02 RX ORDER — ACETAMINOPHEN 325 MG/1
650 TABLET ORAL ONCE
Status: COMPLETED | OUTPATIENT
Start: 2025-01-02 | End: 2025-01-02

## 2025-01-02 RX ORDER — LORAZEPAM 2 MG/ML
1 INJECTION INTRAMUSCULAR ONCE
Status: COMPLETED | OUTPATIENT
Start: 2025-01-02 | End: 2025-01-02

## 2025-01-02 RX ORDER — METOCLOPRAMIDE HYDROCHLORIDE 5 MG/ML
10 INJECTION INTRAMUSCULAR; INTRAVENOUS ONCE
Status: COMPLETED | OUTPATIENT
Start: 2025-01-02 | End: 2025-01-02

## 2025-01-02 RX ORDER — LEVETIRACETAM 500 MG/5ML
1000 INJECTION, SOLUTION, CONCENTRATE INTRAVENOUS ONCE
Status: COMPLETED | OUTPATIENT
Start: 2025-01-02 | End: 2025-01-02

## 2025-01-02 RX ORDER — 0.9 % SODIUM CHLORIDE 0.9 %
1000 INTRAVENOUS SOLUTION INTRAVENOUS ONCE
Status: COMPLETED | OUTPATIENT
Start: 2025-01-02 | End: 2025-01-02

## 2025-01-02 RX ORDER — DIPHENHYDRAMINE HYDROCHLORIDE 50 MG/ML
12.5 INJECTION INTRAMUSCULAR; INTRAVENOUS ONCE
Status: COMPLETED | OUTPATIENT
Start: 2025-01-02 | End: 2025-01-02

## 2025-01-02 RX ADMIN — LORAZEPAM 1 MG: 2 INJECTION INTRAMUSCULAR; INTRAVENOUS at 22:11

## 2025-01-02 RX ADMIN — DIPHENHYDRAMINE HYDROCHLORIDE 12.5 MG: 50 INJECTION INTRAMUSCULAR; INTRAVENOUS at 20:30

## 2025-01-02 RX ADMIN — LEVETIRACETAM 1000 MG: 100 INJECTION INTRAVENOUS at 22:02

## 2025-01-02 RX ADMIN — SODIUM CHLORIDE 1000 ML: 9 INJECTION, SOLUTION INTRAVENOUS at 22:01

## 2025-01-02 RX ADMIN — METOCLOPRAMIDE 10 MG: 5 INJECTION, SOLUTION INTRAMUSCULAR; INTRAVENOUS at 20:30

## 2025-01-02 RX ADMIN — ACETAMINOPHEN 650 MG: 325 TABLET ORAL at 22:11

## 2025-01-02 ASSESSMENT — PAIN SCALES - GENERAL
PAINLEVEL_OUTOF10: 9
PAINLEVEL_OUTOF10: 7

## 2025-01-02 ASSESSMENT — ENCOUNTER SYMPTOMS
COLOR CHANGE: 0
SHORTNESS OF BREATH: 0
VOMITING: 0
PHOTOPHOBIA: 0
FACIAL SWELLING: 1
ABDOMINAL PAIN: 0

## 2025-01-02 ASSESSMENT — PAIN DESCRIPTION - LOCATION
LOCATION: HEAD
LOCATION: HEAD

## 2025-01-02 ASSESSMENT — PAIN DESCRIPTION - FREQUENCY: FREQUENCY: INTERMITTENT

## 2025-01-02 ASSESSMENT — PAIN DESCRIPTION - DESCRIPTORS
DESCRIPTORS: ACHING
DESCRIPTORS: ACHING

## 2025-01-02 ASSESSMENT — PAIN - FUNCTIONAL ASSESSMENT
PAIN_FUNCTIONAL_ASSESSMENT: ACTIVITIES ARE NOT PREVENTED
PAIN_FUNCTIONAL_ASSESSMENT: ACTIVITIES ARE NOT PREVENTED

## 2025-01-03 NOTE — ED NOTES
Provider order placed for patient's discharge. Provider reviewed decision to discharge with the patient. Discharge paperwork and any prescriptions were reviewed with the patient. Patient verbalized understanding of discharge education and any prescriptions and has no further questions or further needs at this time. Patient left with all personal belongings and was stable upon departure. Patient thanked for choosing [facility] and informed to return should any need arise.

## 2025-01-03 NOTE — ED PROVIDER NOTES
[facility] EMERGENCY DEPARTMENT  EMERGENCY DEPARTMENT ENCOUNTER        Pt Name: Kvng Damon  MRN: [id number]  Birthdate 1975  Date of evaluation: 1/2/2025  Provider: STARR Chi  PCP: No primary care provider on file.  Note Started: 8:31 PM EST 1/2/25      WILLIAMS. I have evaluated this patient.        CHIEF COMPLAINT       Chief Complaint   Patient presents with    Seizures     Pt to ED with report of seizure today at ~ 0400.   Pt states he takes keppra at home. Pt reports no missed doses. Pt states he was drinking alcohol last night at a party. Pt states he hit his head.       HISTORY OF PRESENT ILLNESS: 1 or more Elements     History from : Patient    Limitations to history : None    Kvng Damon is a 49 y.o. male who presents status post head injury. He tells me that he anesthesia around 4 AM. He had been sleeping got up and was walking when he had a seizure and tells me that he hit his head into the drywall. He is had a headache all day. Some nausea. No vomiting denies chest pain or abdominal pain denies other injury. He tells me he is on Keppra and has not had a dose change in about a year and a half. He tells me he does have a neurologist. He tells me he is had compliance with Keppra but that the same thing happened about a month ago after he had drank heavily at a party. He does not drink daily tells me he only drinks occasionally but when he does he at times drinks heavily. Denies drug use. Denies numbness or weakness. Denies blood thinner use. He denies blurred vision or loss of vision. Has contusion over the left side of his forehead and eye.    Nursing Notes were all reviewed and agreed with or any disagreements were addressed in the HPI.    REVIEW OF SYSTEMS :      Review of Systems   Constitutional:  Negative for fever.   HENT:  Positive for facial swelling.    Eyes:  Negative for photophobia and visual disturbance.   Respiratory:  Negative for shortness of

## 2025-01-24 ENCOUNTER — APPOINTMENT (OUTPATIENT)
Dept: GENERAL RADIOLOGY | Age: 50
End: 2025-01-24
Payer: COMMERCIAL

## 2025-01-24 ENCOUNTER — HOSPITAL ENCOUNTER (EMERGENCY)
Age: 50
Discharge: HOME | End: 2025-01-24
Attending: EMERGENCY MEDICINE
Payer: COMMERCIAL

## 2025-01-24 VITALS
BODY MASS INDEX: 32.64 KG/M2 | WEIGHT: 246.25 LBS | HEART RATE: 78 BPM | OXYGEN SATURATION: 94 % | TEMPERATURE: 97.8 F | HEIGHT: 73 IN | RESPIRATION RATE: 16 BRPM | SYSTOLIC BLOOD PRESSURE: 129 MMHG | DIASTOLIC BLOOD PRESSURE: 94 MMHG

## 2025-01-24 DIAGNOSIS — S76.112A RUPTURE OF LEFT QUADRICEPS MUSCLE, INITIAL ENCOUNTER: Primary | ICD-10-CM

## 2025-01-24 PROCEDURE — 99284 EMERGENCY DEPT VISIT MOD MDM: CPT

## 2025-01-24 PROCEDURE — 96372 THER/PROPH/DIAG INJ SC/IM: CPT

## 2025-01-24 PROCEDURE — 73562 X-RAY EXAM OF KNEE 3: CPT

## 2025-01-24 PROCEDURE — 6360000002 HC RX W HCPCS: Performed by: EMERGENCY MEDICINE

## 2025-01-24 RX ORDER — MORPHINE SULFATE 4 MG/ML
4 INJECTION, SOLUTION INTRAMUSCULAR; INTRAVENOUS
Status: COMPLETED | OUTPATIENT
Start: 2025-01-24 | End: 2025-01-24

## 2025-01-24 RX ORDER — HYDROCODONE BITARTRATE AND ACETAMINOPHEN 5; 325 MG/1; MG/1
1 TABLET ORAL EVERY 6 HOURS PRN
Qty: 20 TABLET | Refills: 0 | Status: SHIPPED | OUTPATIENT
Start: 2025-01-24 | End: 2025-01-28

## 2025-01-24 RX ORDER — IBUPROFEN 400 MG/1
400 TABLET, FILM COATED ORAL EVERY 6 HOURS PRN
Qty: 120 TABLET | Refills: 0 | Status: ON HOLD | OUTPATIENT
Start: 2025-01-24 | End: 2025-01-29 | Stop reason: HOSPADM

## 2025-01-24 RX ORDER — KETOROLAC TROMETHAMINE 30 MG/ML
30 INJECTION, SOLUTION INTRAMUSCULAR; INTRAVENOUS ONCE
Status: COMPLETED | OUTPATIENT
Start: 2025-01-24 | End: 2025-01-24

## 2025-01-24 RX ADMIN — KETOROLAC TROMETHAMINE 30 MG: 30 INJECTION, SOLUTION INTRAMUSCULAR at 22:19

## 2025-01-24 RX ADMIN — MORPHINE SULFATE 4 MG: 4 INJECTION, SOLUTION INTRAMUSCULAR; INTRAVENOUS at 22:19

## 2025-01-24 ASSESSMENT — PAIN DESCRIPTION - LOCATION
LOCATION: KNEE

## 2025-01-24 ASSESSMENT — PAIN SCALES - GENERAL
PAINLEVEL_OUTOF10: 6
PAINLEVEL_OUTOF10: 8
PAINLEVEL_OUTOF10: 8

## 2025-01-24 ASSESSMENT — LIFESTYLE VARIABLES
HOW OFTEN DO YOU HAVE A DRINK CONTAINING ALCOHOL: 2-4 TIMES A MONTH
HOW MANY STANDARD DRINKS CONTAINING ALCOHOL DO YOU HAVE ON A TYPICAL DAY: 3 OR 4

## 2025-01-24 ASSESSMENT — PAIN DESCRIPTION - PAIN TYPE
TYPE: ACUTE PAIN
TYPE: ACUTE PAIN

## 2025-01-24 ASSESSMENT — PAIN DESCRIPTION - ORIENTATION
ORIENTATION: LEFT

## 2025-01-24 ASSESSMENT — PAIN DESCRIPTION - DESCRIPTORS
DESCRIPTORS: CRUSHING;DISCOMFORT

## 2025-01-24 ASSESSMENT — PAIN - FUNCTIONAL ASSESSMENT
PAIN_FUNCTIONAL_ASSESSMENT: PREVENTS OR INTERFERES WITH MANY ACTIVE NOT PASSIVE ACTIVITIES
PAIN_FUNCTIONAL_ASSESSMENT: 0-10
PAIN_FUNCTIONAL_ASSESSMENT: PREVENTS OR INTERFERES WITH MANY ACTIVE NOT PASSIVE ACTIVITIES
PAIN_FUNCTIONAL_ASSESSMENT: PREVENTS OR INTERFERES WITH MANY ACTIVE NOT PASSIVE ACTIVITIES

## 2025-01-25 NOTE — ED NOTES
Ice packed placed on his left knee. Pedal pulses + 3. No noted deformity, limited movement.     Pt did state he's had about a half a bottle of wine tonight.

## 2025-01-25 NOTE — ED PROVIDER NOTES
CHIEF COMPLAINT  Chief Complaint   Patient presents with    Knee Pain     Pt presents to the ED after slipping on ice \"blowing out\" his left knee; x 1 hr.        HISTORY OF PRESENT ILLNESS  Kvng Damon is a 49 y.o. male who presents to the ED complaining of having slipped on the ice tonight injuring the left knee with complaints of pain over the anterior aspect of the left knee and patella that radiates up the anterior quadriceps toward the hip.  Patient denies any other injury to the head, trunk, neck or back.  No leg paresthesias although patient reports he cannot bear weight on the left leg or extend the knee.  No knee or leg paresthesias    No other complaints, modifying factors or associated symptoms.     Nursing notes reviewed.   Past Medical History:   Diagnosis Date    Cerebral artery occlusion with cerebral infarction (HCC)     Hyperlipidemia     Hypertension     Obesity     Seizures (HCC)      Past Surgical History:   Procedure Laterality Date    FRACTURE SURGERY       Family History   Problem Relation Age of Onset    Other Mother     Stroke Father      Social History     Socioeconomic History    Marital status:      Spouse name: Not on file    Number of children: Not on file    Years of education: Not on file    Highest education level: Not on file   Occupational History    Not on file   Tobacco Use    Smoking status: Former     Passive exposure: Past    Smokeless tobacco: Never   Vaping Use    Vaping status: Never Used   Substance and Sexual Activity    Alcohol use: Yes     Comment: daily    Drug use: Yes     Types: Marijuana (Weed)    Sexual activity: Not Currently     Partners: Female   Other Topics Concern    Not on file   Social History Narrative    Not on file     Social Determinants of Health     Financial Resource Strain: Not on file   Food Insecurity: No Food Insecurity (6/2/2024)    Received from [facility]    Hunger Vital Sign     Worried About Running Out of Food in the Last Year:

## 2025-01-25 NOTE — ED NOTES
Discharge and education instructions reviewed. Patient verbalized understanding, teach-back successful. Patient denied questions at this time. No acute distress noted. Patient instructed to follow-up as noted - return to emergency department if symptoms worsen. Patient verbalized understanding. Discharged per EDMD with discharged instructions.    Significant other is driving the pt home.

## 2025-01-25 NOTE — ED NOTES
KATIE Hill cleaning the abrasions with chlorhexidine on his left ring and pinky finger.    [Negative] : Cardiovascular

## 2025-01-25 NOTE — ED NOTES
Liz RN has given crutches to the patient, adjusted them and provided complete instructions on safe use. Pt gave a return demo. No further questions.

## 2025-01-27 ENCOUNTER — TELEPHONE (OUTPATIENT)
Dept: ORTHOPEDIC SURGERY | Age: 50
End: 2025-01-27

## 2025-01-27 NOTE — TELEPHONE ENCOUNTER
Did leave message regarding ED referral for an appointment. Upon return call please schedule with Dr. Biggs.

## 2025-01-28 ENCOUNTER — OFFICE VISIT (OUTPATIENT)
Dept: ORTHOPEDIC SURGERY | Age: 50
End: 2025-01-28
Payer: COMMERCIAL

## 2025-01-28 ENCOUNTER — PREP FOR PROCEDURE (OUTPATIENT)
Dept: ORTHOPEDIC SURGERY | Age: 50
End: 2025-01-28

## 2025-01-28 ENCOUNTER — ANESTHESIA EVENT (OUTPATIENT)
Dept: OPERATING ROOM | Age: 50
End: 2025-01-28
Payer: COMMERCIAL

## 2025-01-28 VITALS — HEIGHT: 73 IN | WEIGHT: 246 LBS | BODY MASS INDEX: 32.6 KG/M2

## 2025-01-28 DIAGNOSIS — S76.112A RUPTURE, TENDON, QUADRICEPS, LEFT, INITIAL ENCOUNTER: ICD-10-CM

## 2025-01-28 DIAGNOSIS — S76.112A RUPTURE OF LEFT QUADRICEPS TENDON, INITIAL ENCOUNTER: Primary | ICD-10-CM

## 2025-01-28 PROCEDURE — 99204 OFFICE O/P NEW MOD 45 MIN: CPT | Performed by: ORTHOPAEDIC SURGERY

## 2025-01-28 RX ORDER — OXYCODONE AND ACETAMINOPHEN 5; 325 MG/1; MG/1
1-2 TABLET ORAL EVERY 6 HOURS PRN
Qty: 40 TABLET | Refills: 0 | Status: SHIPPED | OUTPATIENT
Start: 2025-01-28 | End: 2025-02-04

## 2025-01-28 NOTE — PROGRESS NOTES
[facility] PRE-OPERATIVE INSTRUCTIONS    Day of Procedure:  1/29              Arrival time:  1140              Surgery time:1340    Take the following medications with a sip of water:  Follow your MD/Surgeons pre-procedure instructions regarding your medications     Do not eat or drink anything after 12:00 midnight prior to your surgery.  This includes water, chewing gum, mints and ice chips.   You may brush your teeth and gargle the morning of your surgery, but do not swallow the water.     Please see your family doctor/pediatrician for a history and physical and/or concerning medications.   Bring any test results/reports from your physicians office.   If you are under the care of a heart doctor or specialist doctor, please be aware that you may be asked to see them for clearance.    You may be asked to stop blood thinners such as Coumadin, Plavix, Fragmin, Lovenox, etc., or any anti-inflammatories such as:  Aspirin, Ibuprofen, Advil, Naproxen prior to your surgery.    We also ask that you stop any over the counter medications such as fish oil, vitamin E, glucosamine, garlic, Multivitamins, COQ 10, etc.    We ask that you do not smoke 24 hours prior to surgery.  We ask that you do not  drink any alcoholic beverages 24 hours prior to surgery     You must make arrangements for a responsible adult to take you home after your surgery.    For your safety, you will not be allowed to leave alone or drive yourself home.  Your surgery will be cancelled if you do not have a ride home.     Also for your safety, you must have someone stay with you the first 24 hours after your surgery.     A parent or legal guardian must accompany a child scheduled for surgery and plan to stay at the hospital until the child is discharged.    Please do not bring other children with you.    For your comfort, please wear simple loose fitting clothing to the hospital.  Please do not bring valuables.    Do not wear any make-up on face

## 2025-01-28 NOTE — PROGRESS NOTES
Kvng Damon  [id number]  January 28, 2025    Chief Complaint   Patient presents with    Knee Pain     Left       History: The patient is a 49-year-old gentleman who is here for evaluation of his left knee.  The patient reportedly slipped on some ice and injured his left knee.  He landed onto his hyperflexed left knee.  The injury occurred on 1/24.  He ultimately presented to the emergency room and x-rays were obtained.  He was placed in a knee immobilizer.  The patient does state that he is an  and recently started a new job.    The patient's  past medical history, medications, allergies,  family history, social history, and have been reviewed, and dated and are recorded in the chart.  Pertinent items are noted in HPI.  Review of systems reviewed from Pertinent History Form dated on 1/28 and available in the patient's chart under the Media tab.     Vitals:  Ht 1.854 m (6' 1\")   Wt 111.6 kg (246 lb)   BMI 32.46 kg/m²     Physical: On examination today, the patient is alert and oriented x 3.  Examination of the left knee reveals moderate swelling.  The patient does have a palpable defect within the quadriceps tendon.  He is unable to perform a straight leg raise.  He is nontender about the joint line.  There is no evidence of gross instability with with stressing of the left knee.  Examination of the skin reveals no lesions or ulcerations.  He is neurovascularly intact distally.    X-rays: 3 views of the left knee obtained in the emergency room were extensively reviewed.  There is evidence of a calcific density proximal to the patella consistent with an avulsion.    Impression: Left knee quadriceps tendon rupture    Plan: At this time, we will go ahead and schedule the patient for a left knee quadriceps tendon repair.  We will go ahead and obtain a stat MRI scan to evaluate the knee.  The patient understands the risks of recurrent injury, infection, neurovascular injury, DVT, chronic pain, chronic

## 2025-01-29 ENCOUNTER — HOSPITAL ENCOUNTER (OUTPATIENT)
Age: 50
Setting detail: OUTPATIENT SURGERY
Discharge: HOME | End: 2025-01-29
Attending: ORTHOPAEDIC SURGERY | Admitting: ORTHOPAEDIC SURGERY
Payer: COMMERCIAL

## 2025-01-29 ENCOUNTER — HOSPITAL ENCOUNTER (OUTPATIENT)
Dept: MRI IMAGING | Age: 50
Discharge: HOME | End: 2025-01-29
Attending: ORTHOPAEDIC SURGERY
Payer: COMMERCIAL

## 2025-01-29 ENCOUNTER — ANESTHESIA (OUTPATIENT)
Dept: OPERATING ROOM | Age: 50
End: 2025-01-29
Payer: COMMERCIAL

## 2025-01-29 VITALS
HEART RATE: 88 BPM | OXYGEN SATURATION: 96 % | HEIGHT: 73 IN | DIASTOLIC BLOOD PRESSURE: 102 MMHG | SYSTOLIC BLOOD PRESSURE: 153 MMHG | TEMPERATURE: 98.2 F | BODY MASS INDEX: 32.6 KG/M2 | RESPIRATION RATE: 16 BRPM | WEIGHT: 246 LBS

## 2025-01-29 DIAGNOSIS — S76.112A RUPTURE OF LEFT QUADRICEPS TENDON, INITIAL ENCOUNTER: ICD-10-CM

## 2025-01-29 PROCEDURE — 73721 MRI JNT OF LWR EXTRE W/O DYE: CPT

## 2025-01-29 PROCEDURE — 6370000000 HC RX 637 (ALT 250 FOR IP): Performed by: ANESTHESIOLOGY

## 2025-01-29 PROCEDURE — 6360000002 HC RX W HCPCS: Performed by: ORTHOPAEDIC SURGERY

## 2025-01-29 PROCEDURE — 2709999900 HC NON-CHARGEABLE SUPPLY: Performed by: ORTHOPAEDIC SURGERY

## 2025-01-29 PROCEDURE — 6360000002 HC RX W HCPCS: Performed by: ANESTHESIOLOGY

## 2025-01-29 PROCEDURE — 3700000000 HC ANESTHESIA ATTENDED CARE: Performed by: ORTHOPAEDIC SURGERY

## 2025-01-29 PROCEDURE — 2500000003 HC RX 250 WO HCPCS: Performed by: NURSE ANESTHETIST, CERTIFIED REGISTERED

## 2025-01-29 PROCEDURE — 2500000003 HC RX 250 WO HCPCS: Performed by: ORTHOPAEDIC SURGERY

## 2025-01-29 PROCEDURE — 2580000003 HC RX 258: Performed by: NURSE ANESTHETIST, CERTIFIED REGISTERED

## 2025-01-29 PROCEDURE — 3600000014 HC SURGERY LEVEL 4 ADDTL 15MIN: Performed by: ORTHOPAEDIC SURGERY

## 2025-01-29 PROCEDURE — 7100000010 HC PHASE II RECOVERY - FIRST 15 MIN: Performed by: ORTHOPAEDIC SURGERY

## 2025-01-29 PROCEDURE — 3700000001 HC ADD 15 MINUTES (ANESTHESIA): Performed by: ORTHOPAEDIC SURGERY

## 2025-01-29 PROCEDURE — 7100000000 HC PACU RECOVERY - FIRST 15 MIN: Performed by: ORTHOPAEDIC SURGERY

## 2025-01-29 PROCEDURE — 7100000011 HC PHASE II RECOVERY - ADDTL 15 MIN: Performed by: ORTHOPAEDIC SURGERY

## 2025-01-29 PROCEDURE — 2580000003 HC RX 258: Performed by: ORTHOPAEDIC SURGERY

## 2025-01-29 PROCEDURE — 6360000002 HC RX W HCPCS: Performed by: NURSE ANESTHETIST, CERTIFIED REGISTERED

## 2025-01-29 PROCEDURE — 7100000001 HC PACU RECOVERY - ADDTL 15 MIN: Performed by: ORTHOPAEDIC SURGERY

## 2025-01-29 PROCEDURE — 3600000004 HC SURGERY LEVEL 4 BASE: Performed by: ORTHOPAEDIC SURGERY

## 2025-01-29 RX ORDER — HYDRALAZINE HYDROCHLORIDE 20 MG/ML
INJECTION INTRAMUSCULAR; INTRAVENOUS
Status: DISCONTINUED | OUTPATIENT
Start: 2025-01-29 | End: 2025-01-29 | Stop reason: SDUPTHER

## 2025-01-29 RX ORDER — LIDOCAINE HYDROCHLORIDE 20 MG/ML
INJECTION, SOLUTION EPIDURAL; INFILTRATION; INTRACAUDAL; PERINEURAL
Status: DISCONTINUED | OUTPATIENT
Start: 2025-01-29 | End: 2025-01-29 | Stop reason: SDUPTHER

## 2025-01-29 RX ORDER — ACETAMINOPHEN 325 MG/1
650 TABLET ORAL
Status: DISCONTINUED | OUTPATIENT
Start: 2025-01-29 | End: 2025-01-29 | Stop reason: HOSPADM

## 2025-01-29 RX ORDER — SODIUM CHLORIDE 9 MG/ML
INJECTION, SOLUTION INTRAVENOUS PRN
Status: DISCONTINUED | OUTPATIENT
Start: 2025-01-29 | End: 2025-01-29 | Stop reason: HOSPADM

## 2025-01-29 RX ORDER — BUPIVACAINE HYDROCHLORIDE AND EPINEPHRINE 2.5; 5 MG/ML; UG/ML
INJECTION, SOLUTION EPIDURAL; INFILTRATION; INTRACAUDAL; PERINEURAL
Status: COMPLETED | OUTPATIENT
Start: 2025-01-29 | End: 2025-01-29

## 2025-01-29 RX ORDER — OXYCODONE HYDROCHLORIDE 5 MG/1
5 TABLET ORAL PRN
Status: COMPLETED | OUTPATIENT
Start: 2025-01-29 | End: 2025-01-29

## 2025-01-29 RX ORDER — OXYCODONE HYDROCHLORIDE 10 MG/1
10 TABLET ORAL PRN
Status: COMPLETED | OUTPATIENT
Start: 2025-01-29 | End: 2025-01-29

## 2025-01-29 RX ORDER — ONDANSETRON 2 MG/ML
4 INJECTION INTRAMUSCULAR; INTRAVENOUS
Status: COMPLETED | OUTPATIENT
Start: 2025-01-29 | End: 2025-01-29

## 2025-01-29 RX ORDER — PROPOFOL 10 MG/ML
INJECTION, EMULSION INTRAVENOUS
Status: DISCONTINUED | OUTPATIENT
Start: 2025-01-29 | End: 2025-01-29 | Stop reason: SDUPTHER

## 2025-01-29 RX ORDER — ROCURONIUM BROMIDE 10 MG/ML
INJECTION, SOLUTION INTRAVENOUS
Status: DISCONTINUED | OUTPATIENT
Start: 2025-01-29 | End: 2025-01-29 | Stop reason: SDUPTHER

## 2025-01-29 RX ORDER — SODIUM CHLORIDE 9 MG/ML
INJECTION, SOLUTION INTRAVENOUS
Status: DISCONTINUED | OUTPATIENT
Start: 2025-01-29 | End: 2025-01-29 | Stop reason: SDUPTHER

## 2025-01-29 RX ORDER — DEXAMETHASONE SODIUM PHOSPHATE 4 MG/ML
INJECTION, SOLUTION INTRA-ARTICULAR; INTRALESIONAL; INTRAMUSCULAR; INTRAVENOUS; SOFT TISSUE
Status: DISCONTINUED | OUTPATIENT
Start: 2025-01-29 | End: 2025-01-29 | Stop reason: SDUPTHER

## 2025-01-29 RX ORDER — SODIUM CHLORIDE 0.9 % (FLUSH) 0.9 %
5-40 SYRINGE (ML) INJECTION EVERY 12 HOURS SCHEDULED
Status: DISCONTINUED | OUTPATIENT
Start: 2025-01-29 | End: 2025-01-29 | Stop reason: HOSPADM

## 2025-01-29 RX ORDER — SODIUM CHLORIDE 0.9 % (FLUSH) 0.9 %
5-40 SYRINGE (ML) INJECTION PRN
Status: DISCONTINUED | OUTPATIENT
Start: 2025-01-29 | End: 2025-01-29 | Stop reason: HOSPADM

## 2025-01-29 RX ORDER — NALOXONE HYDROCHLORIDE 0.4 MG/ML
INJECTION, SOLUTION INTRAMUSCULAR; INTRAVENOUS; SUBCUTANEOUS PRN
Status: DISCONTINUED | OUTPATIENT
Start: 2025-01-29 | End: 2025-01-29 | Stop reason: HOSPADM

## 2025-01-29 RX ORDER — MIDAZOLAM HYDROCHLORIDE 1 MG/ML
INJECTION, SOLUTION INTRAMUSCULAR; INTRAVENOUS
Status: DISCONTINUED | OUTPATIENT
Start: 2025-01-29 | End: 2025-01-29 | Stop reason: SDUPTHER

## 2025-01-29 RX ORDER — FENTANYL CITRATE 50 UG/ML
INJECTION, SOLUTION INTRAMUSCULAR; INTRAVENOUS
Status: DISCONTINUED | OUTPATIENT
Start: 2025-01-29 | End: 2025-01-29 | Stop reason: SDUPTHER

## 2025-01-29 RX ORDER — METOPROLOL TARTRATE 1 MG/ML
INJECTION, SOLUTION INTRAVENOUS
Status: DISCONTINUED | OUTPATIENT
Start: 2025-01-29 | End: 2025-01-29 | Stop reason: SDUPTHER

## 2025-01-29 RX ORDER — DEXMEDETOMIDINE HYDROCHLORIDE 100 UG/ML
INJECTION, SOLUTION INTRAVENOUS
Status: DISCONTINUED | OUTPATIENT
Start: 2025-01-29 | End: 2025-01-29 | Stop reason: SDUPTHER

## 2025-01-29 RX ORDER — METHOCARBAMOL 100 MG/ML
INJECTION, SOLUTION INTRAMUSCULAR; INTRAVENOUS
Status: DISCONTINUED | OUTPATIENT
Start: 2025-01-29 | End: 2025-01-29 | Stop reason: SDUPTHER

## 2025-01-29 RX ORDER — FENTANYL CITRATE 0.05 MG/ML
50 INJECTION, SOLUTION INTRAMUSCULAR; INTRAVENOUS EVERY 5 MIN PRN
Status: DISCONTINUED | OUTPATIENT
Start: 2025-01-29 | End: 2025-01-29 | Stop reason: HOSPADM

## 2025-01-29 RX ORDER — MAGNESIUM HYDROXIDE 1200 MG/15ML
LIQUID ORAL CONTINUOUS PRN
Status: COMPLETED | OUTPATIENT
Start: 2025-01-29 | End: 2025-01-29

## 2025-01-29 RX ORDER — ONDANSETRON 2 MG/ML
INJECTION INTRAMUSCULAR; INTRAVENOUS
Status: DISCONTINUED | OUTPATIENT
Start: 2025-01-29 | End: 2025-01-29 | Stop reason: SDUPTHER

## 2025-01-29 RX ORDER — SUCCINYLCHOLINE/SOD CL,ISO/PF 200MG/10ML
SYRINGE (ML) INTRAVENOUS
Status: DISCONTINUED | OUTPATIENT
Start: 2025-01-29 | End: 2025-01-29 | Stop reason: SDUPTHER

## 2025-01-29 RX ADMIN — HYDROMORPHONE HYDROCHLORIDE 0.5 MG: 1 INJECTION, SOLUTION INTRAMUSCULAR; INTRAVENOUS; SUBCUTANEOUS at 16:02

## 2025-01-29 RX ADMIN — METOPROLOL TARTRATE 3 MG: 5 INJECTION, SOLUTION INTRAVENOUS at 14:22

## 2025-01-29 RX ADMIN — HYDROMORPHONE HYDROCHLORIDE 0.5 MG: 1 INJECTION, SOLUTION INTRAMUSCULAR; INTRAVENOUS; SUBCUTANEOUS at 16:08

## 2025-01-29 RX ADMIN — PROPOFOL 200 MG: 10 INJECTION, EMULSION INTRAVENOUS at 13:52

## 2025-01-29 RX ADMIN — DEXMEDETOMIDINE HYDROCHLORIDE 10 MCG: 100 INJECTION, SOLUTION INTRAVENOUS at 14:24

## 2025-01-29 RX ADMIN — FENTANYL CITRATE 50 MCG: 0.05 INJECTION, SOLUTION INTRAMUSCULAR; INTRAVENOUS at 15:42

## 2025-01-29 RX ADMIN — FENTANYL CITRATE 50 MCG: 0.05 INJECTION, SOLUTION INTRAMUSCULAR; INTRAVENOUS at 15:37

## 2025-01-29 RX ADMIN — FENTANYL CITRATE 100 MCG: 50 INJECTION INTRAMUSCULAR; INTRAVENOUS at 13:52

## 2025-01-29 RX ADMIN — ONDANSETRON 4 MG: 2 INJECTION INTRAMUSCULAR; INTRAVENOUS at 14:00

## 2025-01-29 RX ADMIN — Medication 160 MG: at 13:52

## 2025-01-29 RX ADMIN — HYDRALAZINE HYDROCHLORIDE 5 MG: 20 INJECTION INTRAMUSCULAR; INTRAVENOUS at 15:23

## 2025-01-29 RX ADMIN — LIDOCAINE HYDROCHLORIDE 50 MG: 20 INJECTION, SOLUTION EPIDURAL; INFILTRATION; INTRACAUDAL; PERINEURAL at 13:52

## 2025-01-29 RX ADMIN — HYDROMORPHONE HYDROCHLORIDE 0.5 MG: 1 INJECTION, SOLUTION INTRAMUSCULAR; INTRAVENOUS; SUBCUTANEOUS at 15:01

## 2025-01-29 RX ADMIN — ROCURONIUM BROMIDE 5 MG: 10 SOLUTION INTRAVENOUS at 13:52

## 2025-01-29 RX ADMIN — HYDROMORPHONE HYDROCHLORIDE 0.5 MG: 1 INJECTION, SOLUTION INTRAMUSCULAR; INTRAVENOUS; SUBCUTANEOUS at 16:17

## 2025-01-29 RX ADMIN — SODIUM CHLORIDE: 9 INJECTION, SOLUTION INTRAVENOUS at 13:49

## 2025-01-29 RX ADMIN — OXYCODONE HYDROCHLORIDE 10 MG: 10 TABLET ORAL at 16:42

## 2025-01-29 RX ADMIN — HYDRALAZINE HYDROCHLORIDE 5 MG: 20 INJECTION INTRAMUSCULAR; INTRAVENOUS at 15:05

## 2025-01-29 RX ADMIN — METHOCARBAMOL 1000 MG: 100 INJECTION INTRAMUSCULAR; INTRAVENOUS at 14:05

## 2025-01-29 RX ADMIN — DEXMEDETOMIDINE HYDROCHLORIDE 5 MCG: 100 INJECTION, SOLUTION INTRAVENOUS at 14:46

## 2025-01-29 RX ADMIN — SUGAMMADEX 200 MG: 100 INJECTION, SOLUTION INTRAVENOUS at 15:14

## 2025-01-29 RX ADMIN — HYDROMORPHONE HYDROCHLORIDE 1 MG: 1 INJECTION, SOLUTION INTRAMUSCULAR; INTRAVENOUS; SUBCUTANEOUS at 14:12

## 2025-01-29 RX ADMIN — FENTANYL CITRATE 50 MCG: 0.05 INJECTION, SOLUTION INTRAMUSCULAR; INTRAVENOUS at 15:48

## 2025-01-29 RX ADMIN — ROCURONIUM BROMIDE 60 MG: 10 SOLUTION INTRAVENOUS at 14:00

## 2025-01-29 RX ADMIN — DEXAMETHASONE SODIUM PHOSPHATE 4 MG: 4 INJECTION, SOLUTION INTRAMUSCULAR; INTRAVENOUS at 14:00

## 2025-01-29 RX ADMIN — METOPROLOL TARTRATE 2 MG: 5 INJECTION, SOLUTION INTRAVENOUS at 14:18

## 2025-01-29 RX ADMIN — HYDROMORPHONE HYDROCHLORIDE 0.5 MG: 1 INJECTION, SOLUTION INTRAMUSCULAR; INTRAVENOUS; SUBCUTANEOUS at 15:27

## 2025-01-29 RX ADMIN — MIDAZOLAM 2 MG: 1 INJECTION INTRAMUSCULAR; INTRAVENOUS at 13:49

## 2025-01-29 RX ADMIN — SODIUM CHLORIDE 2000 MG: 900 INJECTION INTRAVENOUS at 13:56

## 2025-01-29 RX ADMIN — PROPOFOL 100 MG: 10 INJECTION, EMULSION INTRAVENOUS at 13:53

## 2025-01-29 RX ADMIN — ONDANSETRON 4 MG: 2 INJECTION, SOLUTION INTRAMUSCULAR; INTRAVENOUS at 16:00

## 2025-01-29 ASSESSMENT — PAIN - FUNCTIONAL ASSESSMENT
PAIN_FUNCTIONAL_ASSESSMENT: 0-10
PAIN_FUNCTIONAL_ASSESSMENT: 0-10

## 2025-01-29 ASSESSMENT — PAIN DESCRIPTION - DESCRIPTORS
DESCRIPTORS: ACHING
DESCRIPTORS: ACHING;DISCOMFORT
DESCRIPTORS: ACHING
DESCRIPTORS: ACHING
DESCRIPTORS: ACHING;DISCOMFORT
DESCRIPTORS: ACHING

## 2025-01-29 ASSESSMENT — PAIN DESCRIPTION - ONSET
ONSET: ON-GOING

## 2025-01-29 ASSESSMENT — PAIN DESCRIPTION - ORIENTATION
ORIENTATION: LEFT

## 2025-01-29 ASSESSMENT — PAIN DESCRIPTION - PAIN TYPE
TYPE: SURGICAL PAIN

## 2025-01-29 ASSESSMENT — PAIN SCALES - GENERAL
PAINLEVEL_OUTOF10: 6
PAINLEVEL_OUTOF10: 8
PAINLEVEL_OUTOF10: 6
PAINLEVEL_OUTOF10: 7
PAINLEVEL_OUTOF10: 8
PAINLEVEL_OUTOF10: 7
PAINLEVEL_OUTOF10: 7
PAINLEVEL_OUTOF10: 8
PAINLEVEL_OUTOF10: 8
PAINLEVEL_OUTOF10: 6

## 2025-01-29 ASSESSMENT — PAIN DESCRIPTION - FREQUENCY
FREQUENCY: CONTINUOUS

## 2025-01-29 ASSESSMENT — PAIN DESCRIPTION - LOCATION
LOCATION: KNEE

## 2025-01-29 ASSESSMENT — LIFESTYLE VARIABLES: SMOKING_STATUS: 0

## 2025-01-29 NOTE — ANESTHESIA PRE PROCEDURE
Department of Anesthesiology  Preprocedure Note       Name:  Kvng Damon   Age:  49 y.o.  :  1975                                          MRN:  [id number]         Date:  2025      Surgeon: Surgeon(s):  Naveen Biggs MD    Procedure: Procedure(s):  QUADRICEPS TENDON REPAIR- LEFT KNEE    Medications prior to admission:   Prior to Admission medications    Medication Sig Start Date End Date Taking? Authorizing Provider   oxyCODONE-acetaminophen (PERCOCET) 5-325 MG per tablet Take 1-2 tablets by mouth every 6 hours as needed for Pain for up to 7 days. Max Daily Amount: 8 tablets 25 Yes Naveen Biggs MD   ibuprofen (IBU) 400 MG tablet Take 1 tablet by mouth every 6 hours as needed for Pain 25  Yes Roger Donato MD   MULTIPLE VITAMINS PO Take 1 capsule by mouth daily   Yes ProviderLoraine MD   lisinopril (PRINIVIL;ZESTRIL) 40 MG tablet TAKE 1 TABLET BY MOUTH EVERY DAY  Patient taking differently: Take 1 tablet by mouth daily 22  Yes Melony García MD   levETIRAcetam (KEPPRA) 750 MG tablet Take 1 tablet by mouth 2 times daily   Yes ProviderLoraine MD   naltrexone (DEPADE) 50 MG tablet Take 1 tablet by mouth daily 10/18/24   ProviderLoraine MD       Current medications:    Current Facility-Administered Medications   Medication Dose Route Frequency Provider Last Rate Last Admin    ceFAZolin (ANCEF) 2,000 mg in sodium chloride 0.9 % 50 mL IVPB (mini-bag)  2,000 mg IntraVENous On Call to OR Naveen Biggs MD           Allergies:  No Known Allergies    Problem List:    Patient Active Problem List   Diagnosis Code    Breakthrough seizure (HCC) G40.919    Essential hypertension I10    Seizure disorder (HCC) G40.909    Acute midline low back pain without sciatica M54.50    Illicit drug use F19.90    Chest tightness R07.89    Rupture, tendon, quadriceps, left, initial encounter S76.112A       Past Medical History:        Diagnosis Date

## 2025-01-29 NOTE — DISCHARGE INSTRUCTIONS
1.  Discharge home #2 nonweightbearing left lower extremity #3 keep the dressing clean and dry and intact until follow-up #4 do not bend the left knee #5 knee immobilizer left lower extremity #6 May loosen the knee immobilizer when not up #7 ice the left knee is much as possible #8 follow-up with Dr. Biggs in 7 to 10 days.  #9 the patient has a Percocet prescription

## 2025-01-29 NOTE — H&P
The patient was interviewed and examined and there have been no changes since the documented History and Physical.  I have presented reasonable alternatives to the patient's proposed care, treatment and services.  The discussion I have done encompassed risks, benefits and side effects related to the alternatives and the risks related to not receiving the proposed care, treatment and services.  Electronically signed by Naveen Biggs MD on 1/29/2025 at 1:52 PM

## 2025-01-29 NOTE — ANESTHESIA POSTPROCEDURE EVALUATION
Department of Anesthesiology  Postprocedure Note    Patient: Kvng Damon  MRN: [id number]  YOB: 1975  Date of evaluation: 1/29/2025    Procedure Summary       Date: 01/29/25 Room / Location: [address]    Anesthesia Start: 1349 Anesthesia Stop: 1530    Procedure: QUADRICEPS TENDON REPAIR- LEFT KNEE (Left: Leg Upper) Diagnosis:       Rupture, tendon, quadriceps, left, initial encounter      (Rupture, tendon, quadriceps, left, initial encounter [S76.112A])    Surgeons: Naveen Biggs MD Responsible Provider: Jessi Kurtz MD    Anesthesia Type: General ASA Status: 3            Anesthesia Type: General    Caleb Phase I: Caleb Score: 10    Caleb Phase II: Caleb Score: 10    Anesthesia Post Evaluation    Patient location during evaluation: PACU  Patient participation: complete - patient participated  Level of consciousness: awake  Airway patency: patent  Nausea & Vomiting: no nausea and no vomiting  Cardiovascular status: hemodynamically stable  Respiratory status: acceptable  Hydration status: stable  Pain management: adequate    No notable events documented.

## 2025-01-29 NOTE — PROGRESS NOTES
Pt received into room 3 from PACU. Report obtained. Vss. Pt stable. Left knee dressing with knee immobilizer C/D/I. Already tolerating PO from PACU. Requesting oral pain pills for pain level 7. Family to room. Call light in reach.

## 2025-01-29 NOTE — OP NOTE
administered per SCIP. A nonsterile tourniquet was applied to the thigh. The left lower   extremity was then Chloraprepped in standard fashion. It was then draped out in standard fashion. We sealed off the skin with the Ioban. We then elevated the tourniquet to 300 mmHg. We then were ready for incision. Using a standard anterior longitudinal midline incision we incised skin and   coagulated all bleeders with Bovie electrocautery. We then dissected down and elevated our skin flaps both medially and laterally. We then noted   complete rupture of the quadriceps tendon. We did evacuate the hematoma and hemarthrosis. The tear involved the quadriceps tendon as well as the retinaculum both medially and laterally. We freshened up the superior pole of the patella with our curette and rongeur. We created a nice bleeding bed of bone. We then went ahead and were ready for passage of our suture. We went ahead and first used a number 5 fiberwire suture and passed this in a weaving Krackow locking fashion. We then passed a number 2 fiberwire suture in a weaving Krackow type of fashion as well. We had a   suture arm from a number 5 suture and a number 2 suture, both medially and laterally. We then were ready for our drill holes through the patella. We   drilled from superior to inferior. We had a medial drill hole and a lateral   drill hole. We then passed our respective suture arms through the   appropriate drill hole with the Petros suture passer. We then extended the knee and first tied our number 5 fiberwire at the inferior pole of the   patella. We then tied our number 2 fiberwire at the inferior pole of the   patella. We then reinforced our repair with number 2 Ethibond sutures. We then closed the retinaculum both medially and laterally with interrupted   figure-of-eight number 1 Vicryl sutures. We were able to range the knee to approximately 80 degrees without any gapping at the repair site. We then irrigated all layers

## 2025-01-29 NOTE — PROGRESS NOTES
To pacu from OR.  PT awake, states pain 8/10 - medicated per crna. Dressing to left knee dry and intact. Pedal pulses palpable.  OLSEN to request.  IV infusing.  Monitor in sinus rhythm.

## 2025-01-29 NOTE — PROGRESS NOTES
D/C instructions reviewed. PT hypertensive. Stated this is his norm at home, takes Lisinopril. Encouraged to monitor BP at home and follow up with PCP. Verbalized understanding. Pt stated filled Rx's at home.

## 2025-01-31 ENCOUNTER — TELEPHONE (OUTPATIENT)
Dept: ORTHOPEDIC SURGERY | Age: 50
End: 2025-01-31

## 2025-01-31 NOTE — TELEPHONE ENCOUNTER
Reaching out to clinic regarding pain medication concern.    S/P LT Quad tendon repairn 1/29   170.18

## 2025-01-31 NOTE — TELEPHONE ENCOUNTER
General Question     Subject: PAIN MED NOT CONTROLLING THE PAIN   Patient and /or Facility Request: Kvng Damon   Contact Number: [phone]      Pt ISN'T ABLE TO SLEEP, AND WANTS TO TAKE MORE PAIN MEDS SOONER, BUT IS ASKING FOR ADVICE ON HOW TO CONTROL THE PAIN BETTER.     PLEASE CALL ASA.     [facility]/pharmacy #[id number] - [place], [place] - [address] - P [phone] - F [phone]

## 2025-01-31 NOTE — TELEPHONE ENCOUNTER
I spoke to pt and advised he not take the Percocet more than prescribed, but he can take ES Acetaminophen in between doses of Percocet. I advised using ice packs for pain relief, and to elevate the leg. He will call us back Tuesday to let us know how he is doing.

## 2025-02-01 ENCOUNTER — TELEPHONE (OUTPATIENT)
Dept: ORTHOPEDIC SURGERY | Age: 50
End: 2025-02-01

## 2025-02-01 NOTE — TELEPHONE ENCOUNTER
Patient called today (2/1/25) stating that he will run out of Percocet tomorrow and pharmacy isn't open on Sundays, so requesting refill today. I looked at chart and saw that Dr Biggs prescribed a 7-day supply on 1/28, so he should not be out of pain meds until 3 days from now. He admits taking more than prescribed because the pain is severe. I explained that we cannot give him anymore narcotics until 2/4. I recommended Tylenol in the meantime (unsure whether Dr Biggs is okay with NSAIDs postoperatively). Patient voiced understanding.

## 2025-02-03 ENCOUNTER — TELEPHONE (OUTPATIENT)
Dept: ORTHOPEDIC SURGERY | Age: 50
End: 2025-02-03

## 2025-02-03 DIAGNOSIS — S76.112A RUPTURE OF LEFT QUADRICEPS TENDON, INITIAL ENCOUNTER: ICD-10-CM

## 2025-02-03 RX ORDER — OXYCODONE AND ACETAMINOPHEN 5; 325 MG/1; MG/1
1-2 TABLET ORAL EVERY 6 HOURS PRN
Qty: 40 TABLET | Refills: 0 | Status: SHIPPED | OUTPATIENT
Start: 2025-02-03 | End: 2025-02-03 | Stop reason: SDUPTHER

## 2025-02-03 RX ORDER — OXYCODONE AND ACETAMINOPHEN 5; 325 MG/1; MG/1
1-2 TABLET ORAL EVERY 6 HOURS PRN
Qty: 40 TABLET | Refills: 0 | Status: SHIPPED | OUTPATIENT
Start: 2025-02-03 | End: 2025-02-10

## 2025-02-03 NOTE — TELEPHONE ENCOUNTER
Other PATIENT CALLED BACK AND WOULD LIKE TO SPEAK TO DR LOUISE. PATIENT UNDERSTOOD PREV ENCOUNTER BUT DOESN'T UNDERSTAND. [phone]

## 2025-02-03 NOTE — TELEPHONE ENCOUNTER
Prescription Refill     Medication Name:  OXYcodone  Pharmacy: [id number] [place] [place] - [address] - P [phone] - F [phone]   Patient Contact Number:  [phone]

## 2025-02-03 NOTE — TELEPHONE ENCOUNTER
Prescription Refill     Medication Name:  OXYCODONE   Pharmacy: [address]   Patient Contact Number:  [phone]         [facility] CALLED IN TO SEE IF THEY CAN SOMEONE IN THE OFFICE ABOUT HIS CURRENT OXYCODONE 5-325MG MEDICATION TO BE SENT TO [facility]. [address] [place] [address] ROAD...     JUST LOOKING FOR TWO DAY. UNTIL THEY MEDS IN STOCK...

## 2025-02-03 NOTE — TELEPHONE ENCOUNTER
General Question     Subject: PRIOR AUTHORIZATION   Patient and /or Facility Request: Kvng Damon \"Gene\"   Contact Number: [phone]     MEMBER FROM [facility] PRIOR AUTHORIZATION DEPARTMENT REQUESTING A CALL BACK REGARDING THE PATIENT'S PRESCRIPTION     PLEASE CALL BACK AT THE ABOVE NUMBER

## 2025-02-03 NOTE — TELEPHONE ENCOUNTER
General Question     Subject: REQ A CALL BACK   Patient and /or Facility Request: Kvng Damon \"Gene\"   Contact Number: [phone]     PATIENT REQ A CALL BACK ABOUT HIS MEDS...    TO SEE IF HE CAN SEE WHERE HIS MEDS ARE..    PLEASE ADVISE

## 2025-02-03 NOTE — TELEPHONE ENCOUNTER
Rx was sent to [facility] on [facility] this morning. I called [facility] and they confirmed they are out of stock and unsure of stock arrival. Dr. Biggs will be back in office tomorrow. We can send another Rx to [facility] then.     I spoke to pt and let him know we can send the Rx tomorrow. He then became irate and verbally abusive. I disconnected the call.

## 2025-02-04 ENCOUNTER — OFFICE VISIT (OUTPATIENT)
Dept: ORTHOPEDIC SURGERY | Age: 50
End: 2025-02-04

## 2025-02-04 VITALS — BODY MASS INDEX: 32.6 KG/M2 | RESPIRATION RATE: 16 BRPM | WEIGHT: 246 LBS | HEIGHT: 73 IN

## 2025-02-04 DIAGNOSIS — S76.112A RUPTURE OF LEFT QUADRICEPS TENDON, INITIAL ENCOUNTER: Primary | ICD-10-CM

## 2025-02-04 PROCEDURE — 99024 POSTOP FOLLOW-UP VISIT: CPT | Performed by: ORTHOPAEDIC SURGERY

## 2025-02-04 NOTE — PROGRESS NOTES
Kvng Damon  [id number]  February 4, 2025    Chief Complaint   Patient presents with    Follow-up     L quad tendon repair        History: The patient is a 49-year-old gentleman who is here for evaluation of his left knee.  He underwent left knee quadriceps tendon repair 6 days ago.  The patient actually walked in today with the knee immobilizer slid down and he was bearing weight through the left lower extremity with a walker.  He is here sooner than his regular scheduled appointment due to the immobilizer loosening.  He rates his pain as 10/10.    The patient's  past medical history, medications, allergies,  family history, social history, and have been reviewed, and dated and are recorded in the chart.  Pertinent items are noted in HPI.  Review of systems reviewed from Pertinent History Form dated on 2/4 and available in the patient's chart under the Media tab.     Vitals:  Resp 16   Ht 1.854 m (6' 1\")   Wt 111.6 kg (246 lb)   BMI 32.46 kg/m²     Physical: On examination, the patient is alert and oriented x 3.  There is no evidence of DVT.  He has mild to moderate swelling.  The incision is well-approximated.  There is no evidence of active drainage.  There is no evidence of erythema or warmth.  On deep palpation, the quadriceps tendon appears intact.  He is vascularly intact distally    Impression: Status post left quadriceps tendon repair    Plan: At this time, a sterile Xeroform dressing was applied to the left knee.  The patient may shower with the Tegaderm dressing.  He was instructed to not bend the knee at all.  He was instructed to remain nonweightbearing on the left lower extremity.  The patient will follow-up with me on Friday of this week and we will reassess him then.  At follow-up, the staples will be removed and Steri-Strips will be applied.  We will ultimately get the patient into a rehab program approximately 3 weeks postop.  He will need to follow-up with me approximately 2 weeks after

## 2025-02-07 ENCOUNTER — OFFICE VISIT (OUTPATIENT)
Dept: ORTHOPEDIC SURGERY | Age: 50
End: 2025-02-07

## 2025-02-07 VITALS — WEIGHT: 246 LBS | HEIGHT: 73 IN | BODY MASS INDEX: 32.6 KG/M2

## 2025-02-07 DIAGNOSIS — S76.112A RUPTURE OF LEFT QUADRICEPS TENDON, INITIAL ENCOUNTER: Primary | ICD-10-CM

## 2025-02-07 DIAGNOSIS — Z98.890 S/P TENDON REPAIR: ICD-10-CM

## 2025-02-07 PROCEDURE — 99024 POSTOP FOLLOW-UP VISIT: CPT | Performed by: ORTHOPAEDIC SURGERY

## 2025-02-07 NOTE — PROGRESS NOTES
Kvng Damon  [id number]  February 7, 2025    Chief Complaint   Patient presents with    Post-Op Check     DOS 1/29/25 L quad tendon repair       History: The patient is a 49-year-old gentleman who is here for evaluation of his left knee.  He underwent left knee quadriceps tendon repair 9 days ago.  The patient walked into the office today with a walker.  He continues to wear the knee immobilizer.  He reports moderate pain.    The patient's  past medical history, medications, allergies,  family history, social history, and have been reviewed, and dated and are recorded in the chart.  Pertinent items are noted in HPI.  Review of systems reviewed from Pertinent History Form dated on 2/4 and available in the patient's chart under the Media tab.     Vitals:  Ht 1.854 m (6' 1\")   Wt 111.6 kg (246 lb)   BMI 32.46 kg/m²     Physical: On examination, the patient is alert and oriented x 3.  There is no evidence of DVT.  He has mild to moderate swelling.  The incision is well-approximated.  There is no evidence of active drainage.  There is no evidence of erythema or warmth.  On deep palpation, the quadriceps tendon appears intact.  He is vascularly intact distally    Impression: Status post left quadriceps tendon repair    Plan: At this time, the staples were removed and Steri-Strips were applied.  The patient will continue toe-touch weightbearing with the knee immobilizer.  She was instructed to keep the knee straight as possible.  We will hold off on physical therapy.  The patient can follow-up with me in 10 days and we will reassess him then.  We will then consider progressing him to 50% weightbearing with the knee immobilizer.  We will also begin a rehab program and allow him to flex the knee to approximately 90 degrees as tolerated.  We will hold off on active and active assisted knee extension.    No orders of the defined types were placed in this encounter.

## 2025-02-10 DIAGNOSIS — S76.112A RUPTURE OF LEFT QUADRICEPS TENDON, INITIAL ENCOUNTER: Primary | ICD-10-CM

## 2025-02-10 NOTE — TELEPHONE ENCOUNTER
Prescription Refill     Medication Name:  OXYCODONE   [facility] [address]   Patient Contact Number:  [phone]     PATIENT CALLED AND STATED HE IS NEEDING HIS OXYCODONE SENT AS A NEW PRESCRIPTION AS HIS PHARMACY WILL NOT FILL IT AS A REFILL    PLEASE CALL BACK THE ABOVE NUMBER

## 2025-02-10 NOTE — TELEPHONE ENCOUNTER
This will be addressed tomorrow when Dr Biggs is back in office.     S/P L quad tendon repair; DOS 1/29/25    Last Rx:  Percocet  1-2  q6  #40  2/3 - 2/10.

## 2025-02-11 RX ORDER — OXYCODONE HYDROCHLORIDE 5 MG/1
5-10 TABLET ORAL EVERY 8 HOURS PRN
Qty: 40 TABLET | Refills: 0 | Status: SHIPPED | OUTPATIENT
Start: 2025-02-11 | End: 2025-02-18

## 2025-02-18 ENCOUNTER — OFFICE VISIT (OUTPATIENT)
Dept: ORTHOPEDIC SURGERY | Age: 50
End: 2025-02-18

## 2025-02-18 VITALS — BODY MASS INDEX: 32.6 KG/M2 | WEIGHT: 246 LBS | HEIGHT: 73 IN

## 2025-02-18 DIAGNOSIS — S76.112A RUPTURE OF LEFT QUADRICEPS TENDON, INITIAL ENCOUNTER: ICD-10-CM

## 2025-02-18 DIAGNOSIS — Z98.890 S/P TENDON REPAIR: Primary | ICD-10-CM

## 2025-02-18 PROCEDURE — 99024 POSTOP FOLLOW-UP VISIT: CPT | Performed by: ORTHOPAEDIC SURGERY

## 2025-02-18 RX ORDER — OXYCODONE HYDROCHLORIDE 5 MG/1
5-10 TABLET ORAL
Qty: 30 TABLET | Refills: 0 | Status: SHIPPED | OUTPATIENT
Start: 2025-02-18 | End: 2025-02-25

## 2025-02-18 NOTE — PROGRESS NOTES
Kvng Damon  [id number]  February 18, 2025    Chief Complaint   Patient presents with    Post-Op Check     DOS 1/29/25 L quad tendon repair        History: The patient is a 49-year-old gentleman who is here for evaluation of his left knee.  He underwent left knee quadriceps tendon repair 3 weeks ago.  He continues to wear the knee immobilizer.  He reports moderate pain.  He has been taking oxycodone.  He is an  and plans on working.    The patient's  past medical history, medications, allergies,  family history, social history, and have been reviewed, and dated and are recorded in the chart.  Pertinent items are noted in HPI.  Review of systems reviewed from Pertinent History Form dated on 2/4 and available in the patient's chart under the Media tab.     Vitals:  Ht 1.854 m (6' 1\")   Wt 111.6 kg (246 lb)   BMI 32.46 kg/m²     Physical: On examination, the patient is alert and oriented x 3.  There is no evidence of DVT.  He has mild to moderate swelling.  The incision is well-approximated.  There is no evidence of active drainage.  There is no evidence of erythema or warmth.  On deep palpation, the quadriceps tendon appears intact.  He is vascularly intact distally    Impression: Status post left quadriceps tendon repair    Plan: At this time, we will get the patient into a rehab program.  We will allow the patient to flex the knee to approximately 90 degrees as tolerated.  We will keep the patient at 50% weightbearing with the knee immobilizer.  Patient will also work on passive knee extension to 0 degrees.  No active knee extension at this time.  He will follow-up with me in 2 weeks and we will reassess him then.  We will then consider progressing his flexion as tolerated.  We will also consider beginning active assisted knee extension.      No orders of the defined types were placed in this encounter.

## 2025-02-25 ENCOUNTER — HOSPITAL ENCOUNTER (OUTPATIENT)
Dept: PHYSICAL THERAPY | Age: 50
Setting detail: THERAPIES SERIES
Discharge: HOME | End: 2025-02-25
Attending: ORTHOPAEDIC SURGERY
Payer: COMMERCIAL

## 2025-02-25 DIAGNOSIS — M25.562 ACUTE PAIN OF LEFT KNEE: Primary | ICD-10-CM

## 2025-02-25 DIAGNOSIS — R26.2 DIFFICULTY WALKING: ICD-10-CM

## 2025-02-25 DIAGNOSIS — R22.40 LOCALIZED SWELLING OF LOWER LEG: ICD-10-CM

## 2025-02-25 PROCEDURE — 97110 THERAPEUTIC EXERCISES: CPT | Performed by: PHYSICAL THERAPIST

## 2025-02-25 PROCEDURE — 97161 PT EVAL LOW COMPLEX 20 MIN: CPT | Performed by: PHYSICAL THERAPIST

## 2025-02-25 NOTE — PLAN OF CARE
[facility]: [address], [place] [address] office: [phone] fax: [phone]     Physical Therapy Initial Evaluation Certification      Dear Naveen Biggs MD ,    We had the pleasure of evaluating the following patient for physical therapy services at [facility].  A summary of our findings can be found in the initial assessment below.  This includes our plan of care.  If you have any questions or concerns regarding these findings, please do not hesitate to contact me at the office phone number listed above.  Thank you for the referral.     Physician Signature:_______________________________Date:__________________  By signing above (or electronic signature), therapist’s plan is approved by physician       Physical Therapy: TREATMENT/PROGRESS NOTE   Patient: Kvng Damon (49 y.o. male)   Examination Date: 2025   :  1975 MRN: [id number]   Visit #: 1   Insurance Allowable Auth Needed   20 hard max w/ [employer] auth [x]Yes    []No    Insurance: Payor: [place] BCBS / Plan: [place]BS - [place] P[place] / Product Type: *No Product type* /   Insurance ID: [id number] - ([facility])  Secondary Insurance (if applicable):    Treatment Diagnosis:     ICD-10-CM    1. Acute pain of left knee  M25.562       2. Difficulty walking  R26.2    3.     R22.4 Localized swelling of lower limb      Medical Diagnosis:  Rupture of left quadriceps tendon, initial encounter [S76.112A]   Referring Physician: Naveen Biggs MD  PCP: Jak Lou MD     Plan of care signed (Y/N):     Date of Patient follow up with Physician:      Plan of Care Report: EVAL today  POC update due: (10 visits /OR AUTH LIMITS, whichever is less)  3/27/2025                                             Medical History:  Comorbidities: htn, hyperlipidemia, CVA, prior seizure per pt report lasted a couple of seconds a few months ago, L hip fx surgery, daily

## 2025-02-27 DIAGNOSIS — S76.112A RUPTURE OF LEFT QUADRICEPS TENDON, INITIAL ENCOUNTER: ICD-10-CM

## 2025-02-27 NOTE — TELEPHONE ENCOUNTER
The patient had a left quad tendon repair on 1/29/25. Please advise on refill.      I spoke to the patient and informed him this would be addressed tomorrow when Dr. Biggs is back in office. He would like a call when the medication is sent to the pharmacy.

## 2025-02-27 NOTE — TELEPHONE ENCOUNTER
Prescription Refill     Medication Name:  OXYCODONE   Pharmacy: [facility] [id number] [address] [address] [phone] - F [phone]   Patient Contact Number:  [phone]       PT CALLING IN REGARDING NEEDING A MEDICATION REFILL.     PT IS COMPLETELY OUT     PLEASE CALL BACK PT

## 2025-02-28 RX ORDER — OXYCODONE HYDROCHLORIDE 5 MG/1
5-10 TABLET ORAL
Qty: 30 TABLET | Refills: 0 | Status: SHIPPED | OUTPATIENT
Start: 2025-02-28 | End: 2025-03-07

## 2025-03-04 ENCOUNTER — OFFICE VISIT (OUTPATIENT)
Dept: ORTHOPEDIC SURGERY | Age: 50
End: 2025-03-04

## 2025-03-04 VITALS — BODY MASS INDEX: 32.6 KG/M2 | RESPIRATION RATE: 16 BRPM | HEIGHT: 73 IN | WEIGHT: 246 LBS

## 2025-03-04 DIAGNOSIS — Z98.890 S/P TENDON REPAIR: Primary | ICD-10-CM

## 2025-03-04 DIAGNOSIS — S76.112A RUPTURE OF LEFT QUADRICEPS TENDON, INITIAL ENCOUNTER: ICD-10-CM

## 2025-03-04 PROCEDURE — 99024 POSTOP FOLLOW-UP VISIT: CPT | Performed by: ORTHOPAEDIC SURGERY

## 2025-03-04 NOTE — PROGRESS NOTES
Kvng Damon  [id number]  March 4, 2025    Chief Complaint   Patient presents with    Follow-up     L quad tendon repair        History: The patient is a 49-year-old gentleman who is here for evaluation of his left knee.  He underwent left knee quadriceps tendon repair 5 weeks ago.  He continues to wear the knee immobilizer.  The patient reportedly slipped out of his bed on Sunday with his knee out in extension.  He stopped himself with the surgical leg.  He noted some increasing pain over the past few days.  He is participating in therapy.     The patient's  past medical history, medications, allergies,  family history, social history, and have been reviewed, and dated and are recorded in the chart.  Pertinent items are noted in HPI.  Review of systems reviewed from Pertinent History Form dated on 2/4 and available in the patient's chart under the Media tab.     Vitals:  Resp 16   Ht 1.854 m (6' 1\")   Wt 111.6 kg (246 lb)   BMI 32.46 kg/m²     Physical: On examination, the patient is alert and oriented x 3.  There is no evidence of DVT.  He has mild swelling.  The incision is well-approximated.  There is no evidence of active drainage.  There is no evidence of erythema or warmth.  On deep palpation, the quadriceps tendon appears intact.  He is vascularly intact distally.  We flexed the left knee to approximately 95 degrees.  He has full knee extension.    Impression: Status post left quadriceps tendon repair    Plan: At this time, the patient will continue the rehab program.  He may begin to weight-bear as tolerated with the knee immobilizer.  He will continue with those restrictions for 1 week.  He may then gradually discontinue the knee immobilizer.  The patient may increase his knee flexion past 90 degrees in 1 week.  He will begin active and active assisted knee extension in 1 week.  The patient can follow-up with me in 2 weeks and we will reassess him then.        No orders of the defined types were

## 2025-03-06 DIAGNOSIS — S76.112A RUPTURE OF LEFT QUADRICEPS TENDON, INITIAL ENCOUNTER: ICD-10-CM

## 2025-03-06 RX ORDER — OXYCODONE HYDROCHLORIDE 5 MG/1
5-10 TABLET ORAL
Qty: 30 TABLET | Refills: 0 | Status: SHIPPED | OUTPATIENT
Start: 2025-03-06 | End: 2025-03-13

## 2025-03-06 NOTE — TELEPHONE ENCOUNTER
General Question     Subject: RED FLAG   Patient and /or Facility Request: Kvng Damon \"Gene\"   Contact Number: [phone]       PT WIFE CALLING IN SHE DOESN'T WANT TO SPEAK WITH TRIAGE BUT THE PT HAD A FALL AND HAD SX ON HIS LT KNEE ON 1- AND THEY BELIEVE HE HURT HIS LT KNEE.    PT FELL ON LAST NIGHT.     PT PREFER TO SPEAK DIRECTLY TO DR. LOUISE OR NURSE I DID OFFER APPT

## 2025-03-06 NOTE — TELEPHONE ENCOUNTER
I spoke to pt. He has an appt tomorrow. I advised he rest and ice the knee until then. He is requesting refill of pain medication. Rx pended.

## 2025-03-07 ENCOUNTER — OFFICE VISIT (OUTPATIENT)
Dept: ORTHOPEDIC SURGERY | Age: 50
End: 2025-03-07

## 2025-03-07 ENCOUNTER — HOSPITAL ENCOUNTER (OUTPATIENT)
Dept: MRI IMAGING | Age: 50
Discharge: HOME | End: 2025-03-07
Attending: ORTHOPAEDIC SURGERY
Payer: COMMERCIAL

## 2025-03-07 VITALS — BODY MASS INDEX: 32.6 KG/M2 | WEIGHT: 246 LBS | HEIGHT: 73 IN

## 2025-03-07 DIAGNOSIS — Z98.890 S/P TENDON REPAIR: ICD-10-CM

## 2025-03-07 DIAGNOSIS — S76.112S RUPTURE OF LEFT QUADRICEPS TENDON, SEQUELA: ICD-10-CM

## 2025-03-07 DIAGNOSIS — S76.112S RUPTURE OF LEFT QUADRICEPS TENDON, SEQUELA: Primary | ICD-10-CM

## 2025-03-07 PROCEDURE — 73721 MRI JNT OF LWR EXTRE W/O DYE: CPT

## 2025-03-07 PROCEDURE — 99024 POSTOP FOLLOW-UP VISIT: CPT | Performed by: ORTHOPAEDIC SURGERY

## 2025-03-07 NOTE — PROGRESS NOTES
or autograft may be necessary.      No orders of the defined types were placed in this encounter.

## 2025-03-10 ENCOUNTER — OFFICE VISIT (OUTPATIENT)
Dept: ORTHOPEDIC SURGERY | Age: 50
End: 2025-03-10
Payer: COMMERCIAL

## 2025-03-10 VITALS — HEIGHT: 73 IN | BODY MASS INDEX: 32.6 KG/M2 | WEIGHT: 246 LBS

## 2025-03-10 DIAGNOSIS — S76.112A RUPTURE OF LEFT QUADRICEPS TENDON, INITIAL ENCOUNTER: ICD-10-CM

## 2025-03-10 DIAGNOSIS — E55.9 VITAMIN D DEFICIENCY: Primary | ICD-10-CM

## 2025-03-10 PROCEDURE — 99215 OFFICE O/P EST HI 40 MIN: CPT | Performed by: ORTHOPAEDIC SURGERY

## 2025-03-10 RX ORDER — OXYCODONE HYDROCHLORIDE 5 MG/1
5-10 TABLET ORAL
Qty: 30 TABLET | Refills: 0 | Status: SHIPPED | OUTPATIENT
Start: 2025-03-10 | End: 2025-03-17

## 2025-03-10 RX ORDER — TRAMADOL HYDROCHLORIDE 50 MG/1
50 TABLET ORAL EVERY 4 HOURS PRN
Qty: 20 TABLET | Refills: 0 | Status: SHIPPED | OUTPATIENT
Start: 2025-03-10 | End: 2025-03-15

## 2025-03-10 NOTE — PROGRESS NOTES
3/10/2025     Reason for visit:  Status post left quadriceps tendon repair on 1/29/2025    History of Present Illness:  The patient is a 49-year-old male who presents for evaluation.  He underwent a left quadriceps tendon repair by Dr. Biggs.  The patient was recovering well postoperatively up until he had a seizure on 3/5/2025.  As result of this this did result in repeat injury of the left knee and subsequent MRI demonstrated a rerupture of his quadriceps tendon.  He currently has continued pain and dysfunction within the left knee and the inability to perform a straight leg raise.    Medical History:  Past Medical History:   Diagnosis Date    Cerebral artery occlusion with cerebral infarction (HCC)     Hyperlipidemia     Hypertension     Obesity     Seizures (HCC)     pt states a couple months ago- it was just a couple seconds. 1/28/25      Past Surgical History:   Procedure Laterality Date    FRACTURE SURGERY      LEFT HIP    LEG MUSCLE SURGERY Left 1/29/2025    QUADRICEPS TENDON REPAIR- LEFT KNEE performed by Naveen Biggs MD at [facility] OR      Family History   Problem Relation Age of Onset    Other Mother     Stroke Father       Social History     Socioeconomic History    Marital status:      Spouse name: Not on file    Number of children: Not on file    Years of education: Not on file    Highest education level: Not on file   Occupational History    Not on file   Tobacco Use    Smoking status: Former     Passive exposure: Past    Smokeless tobacco: Never   Vaping Use    Vaping status: Never Used   Substance and Sexual Activity    Alcohol use: Yes     Comment: daily- told to NOT drink 24 hours prior to sx    Drug use: Yes     Types: Marijuana (Weed)     Comment: TOLD NOT TO SMOKE PRIOR TO SURGERY    Sexual activity: Not Currently     Partners: Female   Other Topics Concern    Not on file   Social History Narrative    Not on file     Social Drivers of Health     Financial Resource Strain: Not

## 2025-03-11 ENCOUNTER — PREP FOR PROCEDURE (OUTPATIENT)
Dept: ORTHOPEDIC SURGERY | Age: 50
End: 2025-03-11

## 2025-03-11 ENCOUNTER — TELEPHONE (OUTPATIENT)
Dept: ORTHOPEDIC SURGERY | Age: 50
End: 2025-03-11

## 2025-03-11 DIAGNOSIS — G89.18 POST-OP PAIN: Primary | ICD-10-CM

## 2025-03-11 DIAGNOSIS — S76.112A RUPTURE OF QUADRICEPS MUSCLE, LEFT, INITIAL ENCOUNTER: ICD-10-CM

## 2025-03-11 NOTE — TELEPHONE ENCOUNTER
Spoke with patient. Went over surgery and arrival time, pre op instructions, and answered any additonal questions. Patient expressed understanding.

## 2025-03-12 ENCOUNTER — TELEPHONE (OUTPATIENT)
Dept: ORTHOPEDIC SURGERY | Age: 50
End: 2025-03-12

## 2025-03-12 RX ORDER — SODIUM CHLORIDE 0.9 % (FLUSH) 0.9 %
5-40 SYRINGE (ML) INJECTION EVERY 12 HOURS SCHEDULED
Status: CANCELLED | OUTPATIENT
Start: 2025-03-12

## 2025-03-12 RX ORDER — SODIUM CHLORIDE 9 MG/ML
INJECTION, SOLUTION INTRAVENOUS PRN
Status: CANCELLED | OUTPATIENT
Start: 2025-03-12

## 2025-03-12 RX ORDER — LORAZEPAM 0.5 MG/1
0.5 TABLET ORAL EVERY 6 HOURS PRN
Status: ON HOLD | COMMUNITY
Start: 2025-03-05 | End: 2025-03-28

## 2025-03-12 RX ORDER — SODIUM CHLORIDE 0.9 % (FLUSH) 0.9 %
5-40 SYRINGE (ML) INJECTION PRN
Status: CANCELLED | OUTPATIENT
Start: 2025-03-12

## 2025-03-12 RX ORDER — VITAMIN B COMPLEX
1 CAPSULE ORAL DAILY
COMMUNITY

## 2025-03-12 NOTE — TELEPHONE ENCOUNTER
Spoke with patient. Unable to get in with PCP for pre op physical. I called PCP and got him scheduled for appt today. Pt aware of appt and will proceed as instructed.

## 2025-03-12 NOTE — PROGRESS NOTES
Email Sherry at Dr. Bob's office concerning H&P. She stated taht patient would need to see his pcp for the H&P prior to surgery. She stated she would call the patient and inform him of this. This writer also called and left voice mail for the patient, informed him Sherry would be calling, but that he would need to have the H&P per his pcp.

## 2025-03-12 NOTE — PROGRESS NOTES
Email to Dr. Bal concerning patient's medical history of seizures in January '25 and most recently 3/5/25. No further orders nor documentation requested.

## 2025-03-13 ENCOUNTER — ANESTHESIA EVENT (OUTPATIENT)
Dept: OPERATING ROOM | Age: 50
End: 2025-03-13
Payer: COMMERCIAL

## 2025-03-13 RX ORDER — HYDROCODONE BITARTRATE AND ACETAMINOPHEN 10; 325 MG/1; MG/1
1 TABLET ORAL EVERY 4 HOURS PRN
Qty: 30 TABLET | Refills: 0 | Status: SHIPPED | OUTPATIENT
Start: 2025-03-14 | End: 2025-03-21

## 2025-03-13 RX ORDER — ONDANSETRON 4 MG/1
4 TABLET, FILM COATED ORAL EVERY 8 HOURS PRN
Qty: 21 TABLET | Refills: 0 | Status: SHIPPED | OUTPATIENT
Start: 2025-03-14

## 2025-03-13 NOTE — ANESTHESIA PRE PROCEDURE
Department of Anesthesiology  Preprocedure Note       Name:  Kvng Damon   Age:  49 y.o.  :  1975                                          MRN:  [id number]         Date:  3/13/2025      Surgeon: Surgeon(s):  Remi Bob MD    Procedure: Procedure(s):  LEFT REVISION QUADRICEPS TENDON REPAIR, POSSIBLE AUTOGRAFT VERSUS ALLOGRAFT, POSSIBLE EXTERNAL FIXATION    Medications prior to admission:   Prior to Admission medications    Medication Sig Start Date End Date Taking? Authorizing Provider   Vitamin D3 125 MCG (5000 UT) TABS tablet Take 2 tablets by mouth daily   Yes Loraine Mcgowan MD   b complex vitamins capsule Take 1 capsule by mouth daily   Yes Loraine Mcgowan MD   LORazepam (ATIVAN) 0.5 MG tablet Take 1 tablet by mouth every 6 hours as needed. 3/5/25  Yes Loraine Mcgowan MD   HYDROcodone-acetaminophen (NORCO)  MG per tablet Take 1 tablet by mouth every 4 hours as needed for Pain for up to 7 days. Intended supply: 30 days Max Daily Amount: 6 tablets 3/14/25 3/21/25  Remi Bob MD   ondansetron (ZOFRAN) 4 MG tablet Take 1 tablet by mouth every 8 hours as needed for Nausea 3/14/25   Remi Bob MD   apixaban (ELIQUIS) 2.5 MG TABS tablet Take 1 tablet by mouth 2 times daily 3/14/25   Remi Bob MD   oxyCODONE (ROXICODONE) 5 MG immediate release tablet Take 1-2 tablets by mouth every 8-12 hours as needed for Pain for up to 7 days. Max Daily Amount: 30 mg 3/10/25 3/17/25  Remi Bob MD   traMADol (ULTRAM) 50 MG tablet Take 1 tablet by mouth every 4 hours as needed for Pain for up to 5 days. Intended supply: 5 days. Take lowest dose possible to manage pain Max Daily Amount: 300 mg 3/10/25 3/15/25  Remi Bob MD   MULTIPLE VITAMINS PO Take 1 capsule by mouth daily    Loraine Mcgowan MD   naltrexone (DEPADE) 50 MG tablet Take 1 tablet by mouth daily 10/18/24   Loraine Mcgowan MD   lisinopril (PRINIVIL;ZESTRIL) 40 MG tablet TAKE 1 TABLET BY

## 2025-03-13 NOTE — DISCHARGE INSTRUCTIONS
Orthopedic Surgery Discharge Instructions    Medications:   A pain medication has been prescribed for you.  Please take this as instructed by the pharmacist.  An anti-nausea medication has been prescribed for you to use as needed for nausea.  Either aspirin or a blood thinner medication may have been prescribed for you.  Please take this as instructed.    Activity:  Partial (50%) weightbearing with crutches.     Incision/dressings:  You may remove your dressing in 72 hours.  Until then the dressing needs to remain clean and dry.  Following removal dressing please apply dry dressing daily.  You may shower in 72 hours and allow the water to run over the incision.  However no submerging underwater or getting in pools.    Follow-up:  If you do not already have a postoperative follow-up appointment scheduled you should call to schedule an appointment within 10 to 14 days of surgery.    Emergency or after hours questions:  Please call the main call center at [phone] for all questions or concerns during evening and weekend hours.  The call center will direct your call to the on-call physician to answer your questions and concerns. During weekly office hours you may call my assistant, Aliza, at [phone].          ANESTHESIA DISCHARGE INSTRUCTIONS    Wear your seatbelt home.  You are under the influence of drugs-do not drink alcohol,drive,operate machinery,or make any important decisions or sign any legal documentsfor 24 hours  A responsible adult needs to be with you for 24 hours.  You may experience lightheadedness,dizziness,or sleepiness following surgery.  Rest at home today- increase activity as tolerated.  Progress slowly to a regular diet unless your physician has instructed you otherwise.Drink plenty of water.  If nausea becomes a problem call your physician.  Coughing,sore throat,and muscle aches are other side effects of  anesthesia,and should improve with time.  Do not drive,operate machinery while taking narcotics.       GENERAL SURGERY DISCHARGE INSTRUCTIONS    Follow your surgeons instructions.  Follow up with your surgeon as directed.  Observe the operative area for signs of excessive bleeding.If needed apply pressure,elevate if able and contact your surgeon.  Observe the operative site for any signs of infection- such as increased pain,redness,fever greater than 101 degrees,swelling, foul odor or drainage.Contact your surgeon if any of these symptoms are present.  Keep operative site clean and dry.  Do not remove dressing unless instructed to by surgeon.  Apply ice as directed.  Avoid pulling,pushing or tugging to suture line.  If you become short of breath call your doctor or go to the ER.  Take medications as directed.  Pain medication should be taken with food.  Do not drive or operate machinery while taking narcotics.  For any problems or question call your surgeon.                          Remi Bob MD            Orthopaedic Surgery Sports Medicine and Arthroscopy            [facility]            Team Physician [facility])

## 2025-03-14 ENCOUNTER — HOSPITAL ENCOUNTER (OUTPATIENT)
Age: 50
Setting detail: OUTPATIENT SURGERY
Discharge: HOME | End: 2025-03-14
Attending: ORTHOPAEDIC SURGERY | Admitting: ORTHOPAEDIC SURGERY
Payer: COMMERCIAL

## 2025-03-14 ENCOUNTER — APPOINTMENT (OUTPATIENT)
Dept: GENERAL RADIOLOGY | Age: 50
End: 2025-03-14
Attending: ORTHOPAEDIC SURGERY
Payer: COMMERCIAL

## 2025-03-14 ENCOUNTER — ANESTHESIA (OUTPATIENT)
Dept: OPERATING ROOM | Age: 50
End: 2025-03-14
Payer: COMMERCIAL

## 2025-03-14 VITALS
WEIGHT: 250 LBS | RESPIRATION RATE: 17 BRPM | HEART RATE: 93 BPM | OXYGEN SATURATION: 94 % | HEIGHT: 73 IN | BODY MASS INDEX: 33.13 KG/M2 | SYSTOLIC BLOOD PRESSURE: 161 MMHG | TEMPERATURE: 97.5 F | DIASTOLIC BLOOD PRESSURE: 106 MMHG

## 2025-03-14 PROCEDURE — C1769 GUIDE WIRE: HCPCS | Performed by: ORTHOPAEDIC SURGERY

## 2025-03-14 PROCEDURE — 27427 RECONSTRUCTION KNEE: CPT | Performed by: ORTHOPAEDIC SURGERY

## 2025-03-14 PROCEDURE — 2709999900 HC NON-CHARGEABLE SUPPLY: Performed by: ORTHOPAEDIC SURGERY

## 2025-03-14 PROCEDURE — 6370000000 HC RX 637 (ALT 250 FOR IP): Performed by: ANESTHESIOLOGY

## 2025-03-14 PROCEDURE — 6360000002 HC RX W HCPCS: Performed by: ANESTHESIOLOGY

## 2025-03-14 PROCEDURE — 2500000003 HC RX 250 WO HCPCS: Performed by: ORTHOPAEDIC SURGERY

## 2025-03-14 PROCEDURE — 6360000002 HC RX W HCPCS: Performed by: NURSE ANESTHETIST, CERTIFIED REGISTERED

## 2025-03-14 PROCEDURE — C1763 CONN TISS, NON-HUMAN: HCPCS | Performed by: ORTHOPAEDIC SURGERY

## 2025-03-14 PROCEDURE — 7100000001 HC PACU RECOVERY - ADDTL 15 MIN: Performed by: ORTHOPAEDIC SURGERY

## 2025-03-14 PROCEDURE — 2580000003 HC RX 258: Performed by: ORTHOPAEDIC SURGERY

## 2025-03-14 PROCEDURE — 7100000011 HC PHASE II RECOVERY - ADDTL 15 MIN: Performed by: ORTHOPAEDIC SURGERY

## 2025-03-14 PROCEDURE — 2720000010 HC SURG SUPPLY STERILE: Performed by: ORTHOPAEDIC SURGERY

## 2025-03-14 PROCEDURE — 6360000002 HC RX W HCPCS: Performed by: ORTHOPAEDIC SURGERY

## 2025-03-14 PROCEDURE — 20690 APPL UNIPLN UNI EXT FIXJ SYS: CPT | Performed by: ORTHOPAEDIC SURGERY

## 2025-03-14 PROCEDURE — 27385 REPAIR OF THIGH MUSCLE: CPT | Performed by: ORTHOPAEDIC SURGERY

## 2025-03-14 PROCEDURE — 7100000010 HC PHASE II RECOVERY - FIRST 15 MIN: Performed by: ORTHOPAEDIC SURGERY

## 2025-03-14 PROCEDURE — 7100000000 HC PACU RECOVERY - FIRST 15 MIN: Performed by: ORTHOPAEDIC SURGERY

## 2025-03-14 PROCEDURE — 3600000004 HC SURGERY LEVEL 4 BASE: Performed by: ORTHOPAEDIC SURGERY

## 2025-03-14 PROCEDURE — 2500000003 HC RX 250 WO HCPCS: Performed by: NURSE ANESTHETIST, CERTIFIED REGISTERED

## 2025-03-14 PROCEDURE — 3700000001 HC ADD 15 MINUTES (ANESTHESIA): Performed by: ORTHOPAEDIC SURGERY

## 2025-03-14 PROCEDURE — 73560 X-RAY EXAM OF KNEE 1 OR 2: CPT

## 2025-03-14 PROCEDURE — 3600000014 HC SURGERY LEVEL 4 ADDTL 15MIN: Performed by: ORTHOPAEDIC SURGERY

## 2025-03-14 PROCEDURE — 3700000000 HC ANESTHESIA ATTENDED CARE: Performed by: ORTHOPAEDIC SURGERY

## 2025-03-14 PROCEDURE — C1713 ANCHOR/SCREW BN/BN,TIS/BN: HCPCS | Performed by: ORTHOPAEDIC SURGERY

## 2025-03-14 DEVICE — IMPLANTABLE DEVICE
Type: IMPLANTABLE DEVICE | Site: KNEE | Status: FUNCTIONAL
Brand: BIOINDUCTIVE IMPLANT WITH ARTHROSCOPIC DELIVERY SYSTEM - LARGE

## 2025-03-14 RX ORDER — SODIUM CHLORIDE 0.9 % (FLUSH) 0.9 %
5-40 SYRINGE (ML) INJECTION EVERY 12 HOURS SCHEDULED
Status: DISCONTINUED | OUTPATIENT
Start: 2025-03-14 | End: 2025-03-14 | Stop reason: HOSPADM

## 2025-03-14 RX ORDER — FENTANYL CITRATE 50 UG/ML
25 INJECTION, SOLUTION INTRAMUSCULAR; INTRAVENOUS EVERY 5 MIN PRN
Status: COMPLETED | OUTPATIENT
Start: 2025-03-14 | End: 2025-03-14

## 2025-03-14 RX ORDER — HYDROMORPHONE HYDROCHLORIDE 2 MG/ML
INJECTION, SOLUTION INTRAMUSCULAR; INTRAVENOUS; SUBCUTANEOUS
Status: DISCONTINUED | OUTPATIENT
Start: 2025-03-14 | End: 2025-03-14 | Stop reason: SDUPTHER

## 2025-03-14 RX ORDER — ROCURONIUM BROMIDE 10 MG/ML
INJECTION, SOLUTION INTRAVENOUS
Status: DISCONTINUED | OUTPATIENT
Start: 2025-03-14 | End: 2025-03-14 | Stop reason: SDUPTHER

## 2025-03-14 RX ORDER — HYDROMORPHONE HYDROCHLORIDE 2 MG/ML
0.5 INJECTION, SOLUTION INTRAMUSCULAR; INTRAVENOUS; SUBCUTANEOUS EVERY 5 MIN PRN
Status: COMPLETED | OUTPATIENT
Start: 2025-03-14 | End: 2025-03-14

## 2025-03-14 RX ORDER — OXYCODONE HYDROCHLORIDE 5 MG/1
5 TABLET ORAL PRN
Status: COMPLETED | OUTPATIENT
Start: 2025-03-14 | End: 2025-03-14

## 2025-03-14 RX ORDER — SODIUM CHLORIDE 9 MG/ML
INJECTION, SOLUTION INTRAVENOUS PRN
Status: DISCONTINUED | OUTPATIENT
Start: 2025-03-14 | End: 2025-03-14 | Stop reason: HOSPADM

## 2025-03-14 RX ORDER — DEXAMETHASONE SODIUM PHOSPHATE 4 MG/ML
INJECTION, SOLUTION INTRA-ARTICULAR; INTRALESIONAL; INTRAMUSCULAR; INTRAVENOUS; SOFT TISSUE
Status: DISCONTINUED | OUTPATIENT
Start: 2025-03-14 | End: 2025-03-14 | Stop reason: SDUPTHER

## 2025-03-14 RX ORDER — HYDRALAZINE HYDROCHLORIDE 20 MG/ML
10 INJECTION INTRAMUSCULAR; INTRAVENOUS
Status: COMPLETED | OUTPATIENT
Start: 2025-03-14 | End: 2025-03-14

## 2025-03-14 RX ORDER — SODIUM CHLORIDE 0.9 % (FLUSH) 0.9 %
5-40 SYRINGE (ML) INJECTION PRN
Status: DISCONTINUED | OUTPATIENT
Start: 2025-03-14 | End: 2025-03-14 | Stop reason: HOSPADM

## 2025-03-14 RX ORDER — MIDAZOLAM HYDROCHLORIDE 1 MG/ML
INJECTION, SOLUTION INTRAMUSCULAR; INTRAVENOUS
Status: DISCONTINUED | OUTPATIENT
Start: 2025-03-14 | End: 2025-03-14 | Stop reason: SDUPTHER

## 2025-03-14 RX ORDER — METOCLOPRAMIDE HYDROCHLORIDE 5 MG/ML
10 INJECTION INTRAMUSCULAR; INTRAVENOUS
Status: DISCONTINUED | OUTPATIENT
Start: 2025-03-14 | End: 2025-03-14 | Stop reason: HOSPADM

## 2025-03-14 RX ORDER — METHOCARBAMOL 100 MG/ML
1000 INJECTION, SOLUTION INTRAMUSCULAR; INTRAVENOUS ONCE
Status: COMPLETED | OUTPATIENT
Start: 2025-03-14 | End: 2025-03-14

## 2025-03-14 RX ORDER — FENTANYL CITRATE 50 UG/ML
INJECTION, SOLUTION INTRAMUSCULAR; INTRAVENOUS
Status: DISCONTINUED
Start: 2025-03-14 | End: 2025-03-14 | Stop reason: HOSPADM

## 2025-03-14 RX ORDER — LABETALOL HYDROCHLORIDE 5 MG/ML
INJECTION, SOLUTION INTRAVENOUS
Status: DISCONTINUED | OUTPATIENT
Start: 2025-03-14 | End: 2025-03-14 | Stop reason: SDUPTHER

## 2025-03-14 RX ORDER — LIDOCAINE HYDROCHLORIDE 20 MG/ML
INJECTION, SOLUTION EPIDURAL; INFILTRATION; INTRACAUDAL; PERINEURAL
Status: DISCONTINUED | OUTPATIENT
Start: 2025-03-14 | End: 2025-03-14 | Stop reason: SDUPTHER

## 2025-03-14 RX ORDER — FENTANYL CITRATE 50 UG/ML
INJECTION, SOLUTION INTRAMUSCULAR; INTRAVENOUS
Status: DISCONTINUED | OUTPATIENT
Start: 2025-03-14 | End: 2025-03-14 | Stop reason: SDUPTHER

## 2025-03-14 RX ORDER — MEPERIDINE HYDROCHLORIDE 25 MG/ML
12.5 INJECTION INTRAMUSCULAR; INTRAVENOUS; SUBCUTANEOUS EVERY 5 MIN PRN
Status: DISCONTINUED | OUTPATIENT
Start: 2025-03-14 | End: 2025-03-14 | Stop reason: HOSPADM

## 2025-03-14 RX ORDER — OXYCODONE HYDROCHLORIDE 5 MG/1
10 TABLET ORAL PRN
Status: COMPLETED | OUTPATIENT
Start: 2025-03-14 | End: 2025-03-14

## 2025-03-14 RX ORDER — ONDANSETRON 2 MG/ML
4 INJECTION INTRAMUSCULAR; INTRAVENOUS
Status: DISCONTINUED | OUTPATIENT
Start: 2025-03-14 | End: 2025-03-14 | Stop reason: HOSPADM

## 2025-03-14 RX ORDER — LORAZEPAM 2 MG/ML
0.5 INJECTION INTRAMUSCULAR
Status: DISCONTINUED | OUTPATIENT
Start: 2025-03-14 | End: 2025-03-14 | Stop reason: HOSPADM

## 2025-03-14 RX ORDER — NALOXONE HYDROCHLORIDE 0.4 MG/ML
INJECTION, SOLUTION INTRAMUSCULAR; INTRAVENOUS; SUBCUTANEOUS PRN
Status: DISCONTINUED | OUTPATIENT
Start: 2025-03-14 | End: 2025-03-14 | Stop reason: HOSPADM

## 2025-03-14 RX ORDER — SUCCINYLCHOLINE/SOD CL,ISO/PF 100 MG/5ML
SYRINGE (ML) INTRAVENOUS
Status: DISCONTINUED | OUTPATIENT
Start: 2025-03-14 | End: 2025-03-14 | Stop reason: SDUPTHER

## 2025-03-14 RX ORDER — FENTANYL CITRATE 50 UG/ML
50 INJECTION, SOLUTION INTRAMUSCULAR; INTRAVENOUS
Status: COMPLETED | OUTPATIENT
Start: 2025-03-14 | End: 2025-03-14

## 2025-03-14 RX ORDER — ONDANSETRON 2 MG/ML
INJECTION INTRAMUSCULAR; INTRAVENOUS
Status: DISCONTINUED | OUTPATIENT
Start: 2025-03-14 | End: 2025-03-14 | Stop reason: SDUPTHER

## 2025-03-14 RX ORDER — LABETALOL HYDROCHLORIDE 5 MG/ML
10 INJECTION, SOLUTION INTRAVENOUS
Status: COMPLETED | OUTPATIENT
Start: 2025-03-14 | End: 2025-03-14

## 2025-03-14 RX ORDER — PROPOFOL 10 MG/ML
INJECTION, EMULSION INTRAVENOUS
Status: DISCONTINUED | OUTPATIENT
Start: 2025-03-14 | End: 2025-03-14 | Stop reason: SDUPTHER

## 2025-03-14 RX ADMIN — HYDROMORPHONE HYDROCHLORIDE 0.5 MG: 2 INJECTION, SOLUTION INTRAMUSCULAR; INTRAVENOUS; SUBCUTANEOUS at 09:19

## 2025-03-14 RX ADMIN — SODIUM CHLORIDE: 9 INJECTION, SOLUTION INTRAVENOUS at 07:46

## 2025-03-14 RX ADMIN — ROCURONIUM BROMIDE 40 MG: 10 INJECTION, SOLUTION INTRAVENOUS at 08:33

## 2025-03-14 RX ADMIN — FENTANYL CITRATE 25 MCG: 50 INJECTION INTRAMUSCULAR; INTRAVENOUS at 11:10

## 2025-03-14 RX ADMIN — DEXAMETHASONE SODIUM PHOSPHATE 8 MG: 4 INJECTION, SOLUTION INTRAMUSCULAR; INTRAVENOUS at 08:40

## 2025-03-14 RX ADMIN — HYDROMORPHONE HYDROCHLORIDE 0.5 MG: 2 INJECTION, SOLUTION INTRAMUSCULAR; INTRAVENOUS; SUBCUTANEOUS at 11:04

## 2025-03-14 RX ADMIN — SUGAMMADEX 200 MG: 100 INJECTION, SOLUTION INTRAVENOUS at 10:21

## 2025-03-14 RX ADMIN — SODIUM CHLORIDE: 9 INJECTION, SOLUTION INTRAVENOUS at 08:09

## 2025-03-14 RX ADMIN — LIDOCAINE HYDROCHLORIDE 100 MG: 20 INJECTION, SOLUTION EPIDURAL; INFILTRATION; INTRACAUDAL; PERINEURAL at 08:12

## 2025-03-14 RX ADMIN — HYDROMORPHONE HYDROCHLORIDE 0.5 MG: 2 INJECTION, SOLUTION INTRAMUSCULAR; INTRAVENOUS; SUBCUTANEOUS at 10:57

## 2025-03-14 RX ADMIN — HYDRALAZINE HYDROCHLORIDE 10 MG: 20 INJECTION INTRAMUSCULAR; INTRAVENOUS at 10:54

## 2025-03-14 RX ADMIN — FENTANYL CITRATE 50 MCG: 50 INJECTION INTRAMUSCULAR; INTRAVENOUS at 11:39

## 2025-03-14 RX ADMIN — PROPOFOL 200 MG: 10 INJECTION, EMULSION INTRAVENOUS at 08:13

## 2025-03-14 RX ADMIN — HYDRALAZINE HYDROCHLORIDE 5 MG: 20 INJECTION INTRAMUSCULAR; INTRAVENOUS at 11:35

## 2025-03-14 RX ADMIN — OXYCODONE 10 MG: 5 TABLET ORAL at 12:02

## 2025-03-14 RX ADMIN — METHOCARBAMOL 1000 MG: 100 INJECTION INTRAMUSCULAR; INTRAVENOUS at 13:42

## 2025-03-14 RX ADMIN — FENTANYL CITRATE 25 MCG: 50 INJECTION, SOLUTION INTRAMUSCULAR; INTRAVENOUS at 08:17

## 2025-03-14 RX ADMIN — LABETALOL HYDROCHLORIDE 5 MG: 5 INJECTION, SOLUTION INTRAVENOUS at 10:25

## 2025-03-14 RX ADMIN — CEFAZOLIN 2000 MG: 2 INJECTION, POWDER, FOR SOLUTION INTRAMUSCULAR; INTRAVENOUS at 08:22

## 2025-03-14 RX ADMIN — HYDROMORPHONE HYDROCHLORIDE 0.5 MG: 2 INJECTION, SOLUTION INTRAMUSCULAR; INTRAVENOUS; SUBCUTANEOUS at 09:35

## 2025-03-14 RX ADMIN — FENTANYL CITRATE 25 MCG: 50 INJECTION, SOLUTION INTRAMUSCULAR; INTRAVENOUS at 08:32

## 2025-03-14 RX ADMIN — MIDAZOLAM 2 MG: 1 INJECTION INTRAMUSCULAR; INTRAVENOUS at 08:05

## 2025-03-14 RX ADMIN — Medication 140 MG: at 08:14

## 2025-03-14 RX ADMIN — FENTANYL CITRATE 25 MCG: 50 INJECTION INTRAMUSCULAR; INTRAVENOUS at 11:16

## 2025-03-14 RX ADMIN — FENTANYL CITRATE 50 MCG: 50 INJECTION, SOLUTION INTRAMUSCULAR; INTRAVENOUS at 08:09

## 2025-03-14 RX ADMIN — ONDANSETRON 4 MG: 2 INJECTION INTRAMUSCULAR; INTRAVENOUS at 08:40

## 2025-03-14 ASSESSMENT — PAIN - FUNCTIONAL ASSESSMENT
PAIN_FUNCTIONAL_ASSESSMENT: 0-10
PAIN_FUNCTIONAL_ASSESSMENT: WONG-BAKER FACES
PAIN_FUNCTIONAL_ASSESSMENT: 0-10

## 2025-03-14 ASSESSMENT — PAIN DESCRIPTION - ORIENTATION
ORIENTATION: LEFT
ORIENTATION: LEFT

## 2025-03-14 ASSESSMENT — PAIN SCALES - GENERAL
PAINLEVEL_OUTOF10: 7
PAINLEVEL_OUTOF10: 5
PAINLEVEL_OUTOF10: 8
PAINLEVEL_OUTOF10: 8
PAINLEVEL_OUTOF10: 9
PAINLEVEL_OUTOF10: 8

## 2025-03-14 ASSESSMENT — PAIN DESCRIPTION - DESCRIPTORS: DESCRIPTORS: THROBBING

## 2025-03-14 ASSESSMENT — PAIN DESCRIPTION - LOCATION
LOCATION: LEG
LOCATION: KNEE

## 2025-03-14 NOTE — PROGRESS NOTES
At approximately 1315 Dr. Mejía notified of pt b/p and continued pain after allotted mediations given. Dr. Mejía bedside to re-assess pt.  New medication order received from Dr. Mejía. Dr. Mejía requests Dr. Tomas to re-assess pt in PACU prior to discharge to rule out subacute compartment syndrome due to patient's pain level. KATIE Damon updated Dr. Tomas on pt status and Dr. Mejía request. Dr. Tomas intra-op at time of notification, states pt may be admitted if pt wishes to stay for continued pain control. Pt medicated and at approximately 1350 states he feels pain is improving and he feels safe to go home. Dr. Mejía updated on pt.

## 2025-03-14 NOTE — PROGRESS NOTES
Pt tolerated juice and pudding. Oxy IR administered. Dr. Mejía, anesthesiologist updated. Order received for Robaxin.

## 2025-03-14 NOTE — ANESTHESIA POSTPROCEDURE EVALUATION
Department of Anesthesiology  Postprocedure Note    Patient: Kvng Damon  MRN: [id number]  YOB: 1975  Date of evaluation: 3/14/2025    Procedure Summary       Date: 03/14/25 Room / Location: [address]    Anesthesia Start: 0809 Anesthesia Stop:     Procedure: LEFT REVISION QUADRICEPS TENDON REPAIR, POSSIBLE AUTOGRAFT VERSUS ALLOGRAFT, POSSIBLE EXTERNAL FIXATION (Left: Leg Upper) Diagnosis:       Rupture of quadriceps muscle, left, initial encounter      (Rupture of quadriceps muscle, left, initial encounter [S76.112A])    Surgeons: Remi Bob MD Responsible Provider:     Anesthesia Type: general ASA Status: 3            Anesthesia Type: No value filed.    Caleb Phase I: Caleb Score: 10    Caleb Phase II:      Anesthesia Post Evaluation    Patient location during evaluation: PACU  Patient participation: complete - patient participated  Level of consciousness: awake  Pain score: 3  Airway patency: patent  Nausea & Vomiting: no nausea and no vomiting  Cardiovascular status: hemodynamically stable  Respiratory status: acceptable  Hydration status: euvolemic  Pain management: adequate    No notable events documented.

## 2025-03-14 NOTE — PROGRESS NOTES
Patient left old knee brace in PACU. Called his cell phone and notified patient. Patient says he does not need it right now and will  sometime next week. Will keep in PACU.

## 2025-03-17 ENCOUNTER — TELEPHONE (OUTPATIENT)
Dept: ORTHOPEDIC SURGERY | Age: 50
End: 2025-03-17

## 2025-03-17 DIAGNOSIS — G89.18 POST-OP PAIN: Primary | ICD-10-CM

## 2025-03-17 RX ORDER — TRAMADOL HYDROCHLORIDE 50 MG/1
50 TABLET ORAL EVERY 4 HOURS PRN
Qty: 30 TABLET | Refills: 0 | Status: SHIPPED | OUTPATIENT
Start: 2025-03-17 | End: 2025-03-24

## 2025-03-17 NOTE — TELEPHONE ENCOUNTER
SPOKE WITH PATIENT. HAVING SOME DRAINAGE FROM DRAFT SITE, INFORMED TO SEND PICTURE THROUGH [employer] WHEN ABLE. ALSO HAVING DIFFICULTY MANAGING PAIN, INFORMED I WOULD CHECK WITH DR LI ABOUT SENDING IN TRAMADOL SCRIPT. PT EXPRESSED UNDERSTANDING.

## 2025-03-18 DIAGNOSIS — G89.18 POST-OP PAIN: ICD-10-CM

## 2025-03-18 RX ORDER — HYDROCODONE BITARTRATE AND ACETAMINOPHEN 10; 325 MG/1; MG/1
1 TABLET ORAL EVERY 4 HOURS PRN
Qty: 30 TABLET | Refills: 0 | Status: SHIPPED | OUTPATIENT
Start: 2025-03-18 | End: 2025-03-25

## 2025-03-18 NOTE — TELEPHONE ENCOUNTER
Prescription Refill     Medication Name:  HYDROCODONE 10 MG  Pharmacy: [place] [address] [address]  [phone]  Patient Contact Number:  [phone]     NO PILLS LEFT...

## 2025-03-20 PROBLEM — S86.819A TRAUMATIC MEDIAL RETINACULAR TEAR OF KNEE: Status: ACTIVE | Noted: 2025-03-20

## 2025-03-20 NOTE — OP NOTE
Operative Note      Patient: Kvng Damon  YOB: 1975  MRN: [id number]    Date of Procedure: 3/14/2025    Pre-Op Diagnosis Codes:      * Rupture of quadriceps muscle, left, initial encounter [S76.112A]    Post-Op Diagnosis: Same       Procedure(s):  LEFT REVISION QUADRICEPS TENDON REPAIR WITH AUTOGRAFT AND AUGMENTATION, APPLICATION OF EXTERNAL FIXATION  Medial and lateral retinacular reconstruction    Surgeon(s):  Remi Bob MD    Assistant:   Surgical Assistant: Leonila Claros Assistant: Laron Squires RN    Anesthesia: General    Estimated Blood Loss (mL): Minimal    Complications: None    Specimens:   * No specimens in log *    Implants:  Implant Name Type Inv. Item Serial No.  Lot No. LRB No. Used Action   IMPLANT BIOINDUCTIVE L BOV ACHILLES TEND W/ ARTHSCP DEL SYS - [id number]  IMPLANT BIOINDUCTIVE L BOV ACHILLES TEND W/ ARTHSCP DEL SYS  [employer]WD [id number] Left 1 Implanted         Drains: * No LDAs found *    Findings:  Infection Present At Time Of Surgery (PATOS) (choose all levels that have infection present):  No infection present  Other Findings: per dictation    Detailed Description of Procedure:   The patient was met in the preoperative holding area.  Surgical site was identified and marked.  Informed consent was obtained.  He was taken back to the operative room and placed on the table in the supine position.  Lower extremity was prepped and draped using sterile technique.  SCD was placed on the contralateral extremity.  Following this the formal timeout was performed in which the correct patient, surgical site, and procedure was reaffirmed.  Preoperative antibiotics were given within 30 minutes of the skin incision.  Next the lower extremity was exsanguinated and the tourniquet beneath the drapes was set to 290 mmHg.  Anterior based incision was made.  Subcutaneous tissue was dissected.  We encountered the zone of injury.  Hematoma was

## 2025-03-24 ENCOUNTER — PREP FOR PROCEDURE (OUTPATIENT)
Dept: ORTHOPEDIC SURGERY | Age: 50
End: 2025-03-24

## 2025-03-24 ENCOUNTER — OFFICE VISIT (OUTPATIENT)
Dept: ORTHOPEDIC SURGERY | Age: 50
End: 2025-03-24

## 2025-03-24 VITALS — WEIGHT: 250 LBS | HEIGHT: 73 IN | BODY MASS INDEX: 33.13 KG/M2

## 2025-03-24 DIAGNOSIS — S76.112A RUPTURE OF QUADRICEPS MUSCLE, LEFT, INITIAL ENCOUNTER: ICD-10-CM

## 2025-03-24 DIAGNOSIS — G89.18 POST-OP PAIN: Primary | ICD-10-CM

## 2025-03-24 PROCEDURE — 99024 POSTOP FOLLOW-UP VISIT: CPT | Performed by: ORTHOPAEDIC SURGERY

## 2025-03-24 RX ORDER — HYDROCODONE BITARTRATE AND ACETAMINOPHEN 10; 325 MG/1; MG/1
1 TABLET ORAL EVERY 4 HOURS PRN
Qty: 30 TABLET | Refills: 0 | Status: ON HOLD | OUTPATIENT
Start: 2025-03-24 | End: 2025-03-28 | Stop reason: HOSPADM

## 2025-03-25 RX ORDER — SULFAMETHOXAZOLE AND TRIMETHOPRIM 800; 160 MG/1; MG/1
1 TABLET ORAL 2 TIMES DAILY
Qty: 28 TABLET | Refills: 0 | Status: ON HOLD | OUTPATIENT
Start: 2025-03-25 | End: 2025-03-28

## 2025-03-25 NOTE — PROGRESS NOTES
3/24/2025     Reason for visit:  Status post left knee revision quadriceps tendon repair with hamstring autograft augmentation and external fixation on 3/14/2025    History of Present Illness:  Patient returns for postoperative evaluation.  Overall he is doing well without major concerns.  He has been 50% weightbearing.  No fever or chills.  No numbness or tingling.    Objective:  Ht 1.854 m (6' 1\")   Wt 113.4 kg (250 lb)   BMI 32.98 kg/m²      Physical Exam:  The patient is well-appearing and in no apparent distress  Examination of the left knee   There is a small effusion, no gross deformity or skin changes  External fixation pins are in place without evidence of infection  5 out of 5 strength throughout distal muscle groups  Sensation is intact to light touch throughout all distributions  There is no calf swelling or tenderness  Palpable DP pulse, brisk cap refill, 2+ symmetric reflexes     Imaging:  AP and lateral x-ray of the left femur, left knee, and left tib-fib was obtained in office today on 3/24/2025 and reviewed.  Patient changes consistent with a revision quadriceps tendon repair and external fixation.  There is no fracture or dislocation.      Assessment:  Status post left knee revision quadriceps tendon repair with hamstring autograft augmentation and external fixation on 3/14/2025    Plan:  Overall the patient is doing well.  Unfortunately he has not changed his dressing or has been cleaning the pin sites however we did  him on that today.  We will initiate an antibiotic for prophylactic purposes.  Will plan for Ex-Fix removal 4 weeks from the date of surgery.  The risk and benefits of that were thoroughly discussed.  He does understand like proceed.                   Remi Bob MD            Orthopaedic Surgery Sports Medicine and Arthroscopy            [facility]            Team Physician [facility]

## 2025-03-26 ENCOUNTER — TELEPHONE (OUTPATIENT)
Dept: ORTHOPEDIC SURGERY | Age: 50
End: 2025-03-26

## 2025-03-26 DIAGNOSIS — G89.18 POST-OP PAIN: ICD-10-CM

## 2025-03-26 NOTE — TELEPHONE ENCOUNTER
Prescription Refill     Medication Name:  TRAMADOL  Pharmacy: [facility]  Patient Contact Number:  [phone]

## 2025-03-27 ENCOUNTER — HOSPITAL ENCOUNTER (INPATIENT)
Age: 50
LOS: 2 days | Discharge: HOME | DRG: 862 | End: 2025-03-29
Attending: HOSPITALIST | Admitting: HOSPITALIST
Payer: COMMERCIAL

## 2025-03-27 DIAGNOSIS — L03.116 LEFT LEG CELLULITIS: ICD-10-CM

## 2025-03-27 DIAGNOSIS — T84.7XXA INFECTION AT SITE OF EXTERNAL FIXATOR PIN, INITIAL ENCOUNTER: Primary | ICD-10-CM

## 2025-03-27 DIAGNOSIS — A41.9 SEPSIS WITHOUT ACUTE ORGAN DYSFUNCTION, DUE TO UNSPECIFIED ORGANISM (HCC): ICD-10-CM

## 2025-03-27 PROBLEM — E66.811 OBESITY, CLASS 1: Status: ACTIVE | Noted: 2025-03-27

## 2025-03-27 PROBLEM — T81.49XA INFECTED SURGICAL WOUND: Status: ACTIVE | Noted: 2025-03-27

## 2025-03-27 PROBLEM — R65.20 SEVERE SEPSIS WITH ACUTE ORGAN DYSFUNCTION (HCC): Status: ACTIVE | Noted: 2025-03-27

## 2025-03-27 LAB
ANION GAP SERPL CALCULATED.3IONS-SCNC: 15 MMOL/L (ref 3–16)
BASOPHILS # BLD: 0 K/UL (ref 0–0.2)
BASOPHILS NFR BLD: 0.2 %
BUN SERPL-MCNC: 9 MG/DL (ref 7–20)
CALCIUM SERPL-MCNC: 9.9 MG/DL (ref 8.3–10.6)
CHLORIDE SERPL-SCNC: 99 MMOL/L (ref 99–110)
CO2 SERPL-SCNC: 21 MMOL/L (ref 21–32)
CREAT SERPL-MCNC: 0.9 MG/DL (ref 0.9–1.3)
CRP SERPL-MCNC: 148 MG/L (ref 0–5.1)
DEPRECATED RDW RBC AUTO: 13.2 % (ref 12.4–15.4)
EOSINOPHIL # BLD: 0.1 K/UL (ref 0–0.6)
EOSINOPHIL NFR BLD: 0.5 %
ERYTHROCYTE [SEDIMENTATION RATE] IN BLOOD BY WESTERGREN METHOD: 75 MM/HR (ref 0–20)
GFR SERPLBLD CREATININE-BSD FMLA CKD-EPI: >90 ML/MIN/{1.73_M2}
GLUCOSE SERPL-MCNC: 133 MG/DL (ref 70–99)
HCT VFR BLD AUTO: 36.4 % (ref 40.5–52.5)
HGB BLD-MCNC: 12.1 G/DL (ref 13.5–17.5)
LACTATE BLDV-SCNC: 2.5 MMOL/L (ref 0.4–1.9)
LYMPHOCYTES # BLD: 1.6 K/UL (ref 1–5.1)
LYMPHOCYTES NFR BLD: 11.4 %
MCH RBC QN AUTO: 30 PG (ref 26–34)
MCHC RBC AUTO-ENTMCNC: 33.4 G/DL (ref 31–36)
MCV RBC AUTO: 89.8 FL (ref 80–100)
MONOCYTES # BLD: 1.5 K/UL (ref 0–1.3)
MONOCYTES NFR BLD: 10.4 %
NEUTROPHILS # BLD: 11 K/UL (ref 1.7–7.7)
NEUTROPHILS NFR BLD: 77.5 %
PLATELET # BLD AUTO: 481 K/UL (ref 135–450)
PMV BLD AUTO: 8.1 FL (ref 5–10.5)
POTASSIUM SERPL-SCNC: 3.6 MMOL/L (ref 3.5–5.1)
PROCALCITONIN SERPL IA-MCNC: 0.05 NG/ML (ref 0–0.15)
RBC # BLD AUTO: 4.05 M/UL (ref 4.2–5.9)
SODIUM SERPL-SCNC: 135 MMOL/L (ref 136–145)
WBC # BLD AUTO: 14.2 K/UL (ref 4–11)

## 2025-03-27 PROCEDURE — 99285 EMERGENCY DEPT VISIT HI MDM: CPT

## 2025-03-27 PROCEDURE — 6360000002 HC RX W HCPCS: Performed by: PHYSICIAN ASSISTANT

## 2025-03-27 PROCEDURE — 87075 CULTR BACTERIA EXCEPT BLOOD: CPT

## 2025-03-27 PROCEDURE — 83605 ASSAY OF LACTIC ACID: CPT

## 2025-03-27 PROCEDURE — 85652 RBC SED RATE AUTOMATED: CPT

## 2025-03-27 PROCEDURE — 87077 CULTURE AEROBIC IDENTIFY: CPT

## 2025-03-27 PROCEDURE — 87070 CULTURE OTHR SPECIMN AEROBIC: CPT

## 2025-03-27 PROCEDURE — 96365 THER/PROPH/DIAG IV INF INIT: CPT

## 2025-03-27 PROCEDURE — 86140 C-REACTIVE PROTEIN: CPT

## 2025-03-27 PROCEDURE — 2580000003 HC RX 258: Performed by: PHYSICIAN ASSISTANT

## 2025-03-27 PROCEDURE — 85025 COMPLETE CBC W/AUTO DIFF WBC: CPT

## 2025-03-27 PROCEDURE — 84145 PROCALCITONIN (PCT): CPT

## 2025-03-27 PROCEDURE — 87205 SMEAR GRAM STAIN: CPT

## 2025-03-27 PROCEDURE — 96375 TX/PRO/DX INJ NEW DRUG ADDON: CPT

## 2025-03-27 PROCEDURE — 36415 COLL VENOUS BLD VENIPUNCTURE: CPT

## 2025-03-27 PROCEDURE — 1200000000 HC SEMI PRIVATE

## 2025-03-27 PROCEDURE — 80048 BASIC METABOLIC PNL TOTAL CA: CPT

## 2025-03-27 PROCEDURE — 87186 SC STD MICRODIL/AGAR DIL: CPT

## 2025-03-27 PROCEDURE — 87040 BLOOD CULTURE FOR BACTERIA: CPT

## 2025-03-27 RX ORDER — SODIUM CHLORIDE 0.9 % (FLUSH) 0.9 %
5-40 SYRINGE (ML) INJECTION PRN
Status: CANCELLED | OUTPATIENT
Start: 2025-03-27

## 2025-03-27 RX ORDER — VANCOMYCIN 2 G/400ML
2000 INJECTION, SOLUTION INTRAVENOUS ONCE
Status: COMPLETED | OUTPATIENT
Start: 2025-03-27 | End: 2025-03-28

## 2025-03-27 RX ORDER — SODIUM CHLORIDE 9 MG/ML
INJECTION, SOLUTION INTRAVENOUS PRN
Status: CANCELLED | OUTPATIENT
Start: 2025-03-27

## 2025-03-27 RX ORDER — 0.9 % SODIUM CHLORIDE 0.9 %
30 INTRAVENOUS SOLUTION INTRAVENOUS ONCE
Status: COMPLETED | OUTPATIENT
Start: 2025-03-27 | End: 2025-03-27

## 2025-03-27 RX ORDER — SODIUM CHLORIDE 0.9 % (FLUSH) 0.9 %
5-40 SYRINGE (ML) INJECTION EVERY 12 HOURS SCHEDULED
Status: CANCELLED | OUTPATIENT
Start: 2025-03-27

## 2025-03-27 RX ORDER — MORPHINE SULFATE 10 MG/ML
6 INJECTION, SOLUTION INTRAMUSCULAR; INTRAVENOUS ONCE
Status: COMPLETED | OUTPATIENT
Start: 2025-03-27 | End: 2025-03-27

## 2025-03-27 RX ORDER — MORPHINE SULFATE 2 MG/ML
2 INJECTION, SOLUTION INTRAMUSCULAR; INTRAVENOUS
Status: DISCONTINUED | OUTPATIENT
Start: 2025-03-27 | End: 2025-03-30 | Stop reason: HOSPADM

## 2025-03-27 RX ORDER — TRAMADOL HYDROCHLORIDE 50 MG/1
50 TABLET ORAL EVERY 4 HOURS PRN
Qty: 30 TABLET | Refills: 0 | Status: ON HOLD | OUTPATIENT
Start: 2025-03-27 | End: 2025-03-28 | Stop reason: HOSPADM

## 2025-03-27 RX ORDER — MORPHINE SULFATE 4 MG/ML
4 INJECTION, SOLUTION INTRAMUSCULAR; INTRAVENOUS
Status: DISCONTINUED | OUTPATIENT
Start: 2025-03-27 | End: 2025-03-30 | Stop reason: HOSPADM

## 2025-03-27 RX ADMIN — MORPHINE SULFATE 6 MG: 10 INJECTION INTRAVENOUS at 22:01

## 2025-03-27 RX ADMIN — SODIUM CHLORIDE 2397 ML: 0.9 INJECTION, SOLUTION INTRAVENOUS at 22:09

## 2025-03-27 RX ADMIN — VANCOMYCIN 2000 MG: 2 INJECTION, SOLUTION INTRAVENOUS at 23:37

## 2025-03-27 RX ADMIN — CEFEPIME 2000 MG: 2 INJECTION, POWDER, FOR SOLUTION INTRAVENOUS at 22:43

## 2025-03-27 ASSESSMENT — PAIN DESCRIPTION - LOCATION: LOCATION: LEG

## 2025-03-27 ASSESSMENT — PAIN SCALES - GENERAL: PAINLEVEL_OUTOF10: 9

## 2025-03-27 ASSESSMENT — PAIN - FUNCTIONAL ASSESSMENT: PAIN_FUNCTIONAL_ASSESSMENT: PREVENTS OR INTERFERES SOME ACTIVE ACTIVITIES AND ADLS

## 2025-03-27 ASSESSMENT — PAIN DESCRIPTION - ORIENTATION: ORIENTATION: LEFT

## 2025-03-28 ENCOUNTER — ANESTHESIA EVENT (OUTPATIENT)
Dept: OPERATING ROOM | Age: 50
End: 2025-03-28
Payer: COMMERCIAL

## 2025-03-28 ENCOUNTER — ANESTHESIA (OUTPATIENT)
Dept: OPERATING ROOM | Age: 50
End: 2025-03-28
Payer: COMMERCIAL

## 2025-03-28 LAB
ANION GAP SERPL CALCULATED.3IONS-SCNC: 9 MMOL/L (ref 3–16)
BACTERIA BLD CULT ORG #2: NORMAL
BACTERIA BLD CULT: NORMAL
BASOPHILS # BLD: 0 K/UL (ref 0–0.2)
BASOPHILS NFR BLD: 0.3 %
BUN SERPL-MCNC: 7 MG/DL (ref 7–20)
CALCIUM SERPL-MCNC: 8.5 MG/DL (ref 8.3–10.6)
CHLORIDE SERPL-SCNC: 102 MMOL/L (ref 99–110)
CO2 SERPL-SCNC: 22 MMOL/L (ref 21–32)
CREAT SERPL-MCNC: 0.6 MG/DL (ref 0.9–1.3)
DEPRECATED RDW RBC AUTO: 13.3 % (ref 12.4–15.4)
EOSINOPHIL # BLD: 0.2 K/UL (ref 0–0.6)
EOSINOPHIL NFR BLD: 1.9 %
GFR SERPLBLD CREATININE-BSD FMLA CKD-EPI: >90 ML/MIN/{1.73_M2}
GLUCOSE SERPL-MCNC: 124 MG/DL (ref 70–99)
HCT VFR BLD AUTO: 32 % (ref 40.5–52.5)
HGB BLD-MCNC: 11 G/DL (ref 13.5–17.5)
LACTATE BLDV-SCNC: 1.6 MMOL/L (ref 0.4–1.9)
LYMPHOCYTES # BLD: 2 K/UL (ref 1–5.1)
LYMPHOCYTES NFR BLD: 18.9 %
MCH RBC QN AUTO: 30.4 PG (ref 26–34)
MCHC RBC AUTO-ENTMCNC: 34.5 G/DL (ref 31–36)
MCV RBC AUTO: 88.1 FL (ref 80–100)
MONOCYTES # BLD: 1.4 K/UL (ref 0–1.3)
MONOCYTES NFR BLD: 13.1 %
NEUTROPHILS # BLD: 6.8 K/UL (ref 1.7–7.7)
NEUTROPHILS NFR BLD: 65.8 %
PLATELET # BLD AUTO: 370 K/UL (ref 135–450)
PMV BLD AUTO: 8 FL (ref 5–10.5)
POTASSIUM SERPL-SCNC: 3.7 MMOL/L (ref 3.5–5.1)
RBC # BLD AUTO: 3.63 M/UL (ref 4.2–5.9)
SODIUM SERPL-SCNC: 133 MMOL/L (ref 136–145)
WBC # BLD AUTO: 10.3 K/UL (ref 4–11)

## 2025-03-28 PROCEDURE — 7100000001 HC PACU RECOVERY - ADDTL 15 MIN: Performed by: ORTHOPAEDIC SURGERY

## 2025-03-28 PROCEDURE — 2709999900 HC NON-CHARGEABLE SUPPLY: Performed by: ORTHOPAEDIC SURGERY

## 2025-03-28 PROCEDURE — 3600000002 HC SURGERY LEVEL 2 BASE: Performed by: ORTHOPAEDIC SURGERY

## 2025-03-28 PROCEDURE — 6360000002 HC RX W HCPCS: Performed by: HOSPITALIST

## 2025-03-28 PROCEDURE — 0JBM0ZZ EXCISION OF LEFT UPPER LEG SUBCUTANEOUS TISSUE AND FASCIA, OPEN APPROACH: ICD-10-PCS | Performed by: ORTHOPAEDIC SURGERY

## 2025-03-28 PROCEDURE — 0QPHX5Z REMOVAL OF EXTERNAL FIXATION DEVICE FROM LEFT TIBIA, EXTERNAL APPROACH: ICD-10-PCS | Performed by: ORTHOPAEDIC SURGERY

## 2025-03-28 PROCEDURE — 7100000000 HC PACU RECOVERY - FIRST 15 MIN: Performed by: ORTHOPAEDIC SURGERY

## 2025-03-28 PROCEDURE — 20694 RMVL EXT FIXJ SYS UNDER ANES: CPT | Performed by: ORTHOPAEDIC SURGERY

## 2025-03-28 PROCEDURE — 2580000003 HC RX 258: Performed by: HOSPITALIST

## 2025-03-28 PROCEDURE — 3600000012 HC SURGERY LEVEL 2 ADDTL 15MIN: Performed by: ORTHOPAEDIC SURGERY

## 2025-03-28 PROCEDURE — 36415 COLL VENOUS BLD VENIPUNCTURE: CPT

## 2025-03-28 PROCEDURE — 3700000000 HC ANESTHESIA ATTENDED CARE: Performed by: ORTHOPAEDIC SURGERY

## 2025-03-28 PROCEDURE — 6360000002 HC RX W HCPCS: Performed by: NURSE PRACTITIONER

## 2025-03-28 PROCEDURE — 6360000002 HC RX W HCPCS: Performed by: ANESTHESIOLOGY

## 2025-03-28 PROCEDURE — 2500000003 HC RX 250 WO HCPCS: Performed by: HOSPITALIST

## 2025-03-28 PROCEDURE — 85025 COMPLETE CBC W/AUTO DIFF WBC: CPT

## 2025-03-28 PROCEDURE — L3650 SO 8 ABD RESTRAINT PRE OTS: HCPCS | Performed by: ORTHOPAEDIC SURGERY

## 2025-03-28 PROCEDURE — 6370000000 HC RX 637 (ALT 250 FOR IP): Performed by: NURSE PRACTITIONER

## 2025-03-28 PROCEDURE — 2500000003 HC RX 250 WO HCPCS: Performed by: ORTHOPAEDIC SURGERY

## 2025-03-28 PROCEDURE — 6370000000 HC RX 637 (ALT 250 FOR IP): Performed by: HOSPITALIST

## 2025-03-28 PROCEDURE — 3700000001 HC ADD 15 MINUTES (ANESTHESIA): Performed by: ORTHOPAEDIC SURGERY

## 2025-03-28 PROCEDURE — 80048 BASIC METABOLIC PNL TOTAL CA: CPT

## 2025-03-28 PROCEDURE — 0QP7X5Z REMOVAL OF EXTERNAL FIXATION DEVICE FROM LEFT UPPER FEMUR, EXTERNAL APPROACH: ICD-10-PCS | Performed by: ORTHOPAEDIC SURGERY

## 2025-03-28 PROCEDURE — 94760 N-INVAS EAR/PLS OXIMETRY 1: CPT

## 2025-03-28 PROCEDURE — 6360000002 HC RX W HCPCS

## 2025-03-28 PROCEDURE — 1200000000 HC SEMI PRIVATE

## 2025-03-28 RX ORDER — ONDANSETRON 2 MG/ML
INJECTION INTRAMUSCULAR; INTRAVENOUS
Status: DISCONTINUED | OUTPATIENT
Start: 2025-03-28 | End: 2025-03-28 | Stop reason: SDUPTHER

## 2025-03-28 RX ORDER — SULFAMETHOXAZOLE AND TRIMETHOPRIM 800; 160 MG/1; MG/1
1 TABLET ORAL 2 TIMES DAILY
Qty: 28 TABLET | Refills: 0 | Status: SHIPPED | OUTPATIENT
Start: 2025-03-28 | End: 2025-04-11

## 2025-03-28 RX ORDER — FENTANYL CITRATE 50 UG/ML
INJECTION, SOLUTION INTRAMUSCULAR; INTRAVENOUS
Status: DISCONTINUED | OUTPATIENT
Start: 2025-03-28 | End: 2025-03-28 | Stop reason: SDUPTHER

## 2025-03-28 RX ORDER — NALTREXONE HYDROCHLORIDE 50 MG/1
50 TABLET, FILM COATED ORAL DAILY
Status: DISCONTINUED | OUTPATIENT
Start: 2025-03-28 | End: 2025-03-30 | Stop reason: HOSPADM

## 2025-03-28 RX ORDER — MIDAZOLAM HYDROCHLORIDE 1 MG/ML
INJECTION, SOLUTION INTRAMUSCULAR; INTRAVENOUS
Status: DISCONTINUED | OUTPATIENT
Start: 2025-03-28 | End: 2025-03-28 | Stop reason: SDUPTHER

## 2025-03-28 RX ORDER — ONDANSETRON 2 MG/ML
4 INJECTION INTRAMUSCULAR; INTRAVENOUS
Status: DISCONTINUED | OUTPATIENT
Start: 2025-03-28 | End: 2025-03-28 | Stop reason: HOSPADM

## 2025-03-28 RX ORDER — SODIUM CHLORIDE 0.9 % (FLUSH) 0.9 %
5-40 SYRINGE (ML) INJECTION EVERY 12 HOURS SCHEDULED
Status: DISCONTINUED | OUTPATIENT
Start: 2025-03-28 | End: 2025-03-30 | Stop reason: HOSPADM

## 2025-03-28 RX ORDER — FENTANYL CITRATE 50 UG/ML
25 INJECTION, SOLUTION INTRAMUSCULAR; INTRAVENOUS EVERY 5 MIN PRN
Status: DISCONTINUED | OUTPATIENT
Start: 2025-03-28 | End: 2025-03-28 | Stop reason: HOSPADM

## 2025-03-28 RX ORDER — SODIUM CHLORIDE 0.9 % (FLUSH) 0.9 %
5-40 SYRINGE (ML) INJECTION EVERY 12 HOURS SCHEDULED
Status: DISCONTINUED | OUTPATIENT
Start: 2025-03-28 | End: 2025-03-28 | Stop reason: HOSPADM

## 2025-03-28 RX ORDER — SODIUM CHLORIDE 0.9 % (FLUSH) 0.9 %
5-40 SYRINGE (ML) INJECTION PRN
Status: DISCONTINUED | OUTPATIENT
Start: 2025-03-28 | End: 2025-03-28 | Stop reason: HOSPADM

## 2025-03-28 RX ORDER — LIDOCAINE HYDROCHLORIDE 20 MG/ML
INJECTION, SOLUTION EPIDURAL; INFILTRATION; INTRACAUDAL; PERINEURAL
Status: DISCONTINUED | OUTPATIENT
Start: 2025-03-28 | End: 2025-03-28 | Stop reason: SDUPTHER

## 2025-03-28 RX ORDER — LEVETIRACETAM 500 MG/1
750 TABLET ORAL 2 TIMES DAILY
Status: DISCONTINUED | OUTPATIENT
Start: 2025-03-28 | End: 2025-03-30 | Stop reason: HOSPADM

## 2025-03-28 RX ORDER — ACETAMINOPHEN 650 MG/1
650 SUPPOSITORY RECTAL EVERY 6 HOURS PRN
Status: DISCONTINUED | OUTPATIENT
Start: 2025-03-28 | End: 2025-03-30 | Stop reason: HOSPADM

## 2025-03-28 RX ORDER — MORPHINE SULFATE 4 MG/ML
6 INJECTION, SOLUTION INTRAMUSCULAR; INTRAVENOUS ONCE
Refills: 0 | Status: COMPLETED | OUTPATIENT
Start: 2025-03-28 | End: 2025-03-28

## 2025-03-28 RX ORDER — VANCOMYCIN 1.25 G/250ML
1750 INJECTION, SOLUTION INTRAVENOUS EVERY 12 HOURS
Status: DISCONTINUED | OUTPATIENT
Start: 2025-03-28 | End: 2025-03-30 | Stop reason: HOSPADM

## 2025-03-28 RX ORDER — HYDROCODONE BITARTRATE AND ACETAMINOPHEN 10; 325 MG/1; MG/1
1 TABLET ORAL EVERY 4 HOURS PRN
Refills: 0 | Status: DISCONTINUED | OUTPATIENT
Start: 2025-03-28 | End: 2025-03-30 | Stop reason: HOSPADM

## 2025-03-28 RX ORDER — VITAMIN B COMPLEX
1 CAPSULE ORAL DAILY
Status: DISCONTINUED | OUTPATIENT
Start: 2025-03-28 | End: 2025-03-30 | Stop reason: HOSPADM

## 2025-03-28 RX ORDER — MAGNESIUM HYDROXIDE 1200 MG/15ML
LIQUID ORAL CONTINUOUS PRN
Status: COMPLETED | OUTPATIENT
Start: 2025-03-28 | End: 2025-03-28

## 2025-03-28 RX ORDER — SODIUM CHLORIDE 0.9 % (FLUSH) 0.9 %
5-40 SYRINGE (ML) INJECTION PRN
Status: DISCONTINUED | OUTPATIENT
Start: 2025-03-28 | End: 2025-03-28

## 2025-03-28 RX ORDER — PROPOFOL 10 MG/ML
INJECTION, EMULSION INTRAVENOUS
Status: DISCONTINUED | OUTPATIENT
Start: 2025-03-28 | End: 2025-03-28 | Stop reason: SDUPTHER

## 2025-03-28 RX ORDER — SODIUM CHLORIDE 9 MG/ML
INJECTION, SOLUTION INTRAVENOUS PRN
Status: DISCONTINUED | OUTPATIENT
Start: 2025-03-28 | End: 2025-03-28 | Stop reason: HOSPADM

## 2025-03-28 RX ORDER — HYDROCODONE BITARTRATE AND ACETAMINOPHEN 10; 325 MG/1; MG/1
1 TABLET ORAL EVERY 6 HOURS PRN
Qty: 20 TABLET | Refills: 0 | Status: SHIPPED | OUTPATIENT
Start: 2025-03-28 | End: 2025-04-02 | Stop reason: SDUPTHER

## 2025-03-28 RX ORDER — SODIUM CHLORIDE 0.9 % (FLUSH) 0.9 %
5-40 SYRINGE (ML) INJECTION EVERY 12 HOURS SCHEDULED
Status: DISCONTINUED | OUTPATIENT
Start: 2025-03-28 | End: 2025-03-28

## 2025-03-28 RX ORDER — LISINOPRIL 40 MG/1
40 TABLET ORAL DAILY
Status: DISCONTINUED | OUTPATIENT
Start: 2025-03-28 | End: 2025-03-30 | Stop reason: HOSPADM

## 2025-03-28 RX ORDER — SODIUM CHLORIDE 9 MG/ML
INJECTION, SOLUTION INTRAVENOUS PRN
Status: DISCONTINUED | OUTPATIENT
Start: 2025-03-28 | End: 2025-03-30 | Stop reason: HOSPADM

## 2025-03-28 RX ORDER — ACETAMINOPHEN 325 MG/1
650 TABLET ORAL EVERY 6 HOURS PRN
Status: DISCONTINUED | OUTPATIENT
Start: 2025-03-28 | End: 2025-03-30 | Stop reason: HOSPADM

## 2025-03-28 RX ORDER — SODIUM CHLORIDE 0.9 % (FLUSH) 0.9 %
5-40 SYRINGE (ML) INJECTION PRN
Status: DISCONTINUED | OUTPATIENT
Start: 2025-03-28 | End: 2025-03-30 | Stop reason: HOSPADM

## 2025-03-28 RX ORDER — TRAMADOL HYDROCHLORIDE 50 MG/1
50 TABLET ORAL EVERY 4 HOURS PRN
Status: DISCONTINUED | OUTPATIENT
Start: 2025-03-28 | End: 2025-03-30 | Stop reason: HOSPADM

## 2025-03-28 RX ORDER — FENTANYL CITRATE 50 UG/ML
25 INJECTION, SOLUTION INTRAMUSCULAR; INTRAVENOUS ONCE
Status: COMPLETED | OUTPATIENT
Start: 2025-03-28 | End: 2025-03-28

## 2025-03-28 RX ORDER — DEXAMETHASONE SODIUM PHOSPHATE 4 MG/ML
INJECTION, SOLUTION INTRA-ARTICULAR; INTRALESIONAL; INTRAMUSCULAR; INTRAVENOUS; SOFT TISSUE
Status: DISCONTINUED | OUTPATIENT
Start: 2025-03-28 | End: 2025-03-28 | Stop reason: SDUPTHER

## 2025-03-28 RX ORDER — MULTIVITAMIN WITH IRON
1 TABLET ORAL DAILY
Status: DISCONTINUED | OUTPATIENT
Start: 2025-03-28 | End: 2025-03-30 | Stop reason: HOSPADM

## 2025-03-28 RX ORDER — SODIUM CHLORIDE 9 MG/ML
INJECTION, SOLUTION INTRAVENOUS PRN
Status: DISCONTINUED | OUTPATIENT
Start: 2025-03-28 | End: 2025-03-28

## 2025-03-28 RX ORDER — NALOXONE HYDROCHLORIDE 0.4 MG/ML
INJECTION, SOLUTION INTRAMUSCULAR; INTRAVENOUS; SUBCUTANEOUS PRN
Status: DISCONTINUED | OUTPATIENT
Start: 2025-03-28 | End: 2025-03-28 | Stop reason: HOSPADM

## 2025-03-28 RX ADMIN — MORPHINE SULFATE 4 MG: 4 INJECTION, SOLUTION INTRAMUSCULAR; INTRAVENOUS at 00:22

## 2025-03-28 RX ADMIN — HYDROMORPHONE HYDROCHLORIDE 0.5 MG: 1 INJECTION, SOLUTION INTRAMUSCULAR; INTRAVENOUS; SUBCUTANEOUS at 15:25

## 2025-03-28 RX ADMIN — MORPHINE SULFATE 4 MG: 4 INJECTION, SOLUTION INTRAMUSCULAR; INTRAVENOUS at 11:50

## 2025-03-28 RX ADMIN — PROPOFOL 200 MG: 10 INJECTION, EMULSION INTRAVENOUS at 14:33

## 2025-03-28 RX ADMIN — DEXAMETHASONE SODIUM PHOSPHATE 8 MG: 4 INJECTION, SOLUTION INTRAMUSCULAR; INTRAVENOUS at 14:37

## 2025-03-28 RX ADMIN — FENTANYL CITRATE 50 MCG: 50 INJECTION INTRAMUSCULAR; INTRAVENOUS at 14:51

## 2025-03-28 RX ADMIN — SODIUM CHLORIDE: 9 INJECTION, SOLUTION INTRAVENOUS at 14:27

## 2025-03-28 RX ADMIN — FENTANYL CITRATE 50 MCG: 50 INJECTION INTRAMUSCULAR; INTRAVENOUS at 15:09

## 2025-03-28 RX ADMIN — MORPHINE SULFATE 4 MG: 4 INJECTION, SOLUTION INTRAMUSCULAR; INTRAVENOUS at 04:30

## 2025-03-28 RX ADMIN — VANCOMYCIN 1750 MG: 1.25 INJECTION, SOLUTION INTRAVENOUS at 12:01

## 2025-03-28 RX ADMIN — VANCOMYCIN 1750 MG: 1.25 INJECTION, SOLUTION INTRAVENOUS at 23:42

## 2025-03-28 RX ADMIN — ACETAMINOPHEN 650 MG: 325 TABLET ORAL at 11:49

## 2025-03-28 RX ADMIN — MORPHINE SULFATE 6 MG: 4 INJECTION INTRAVENOUS at 03:21

## 2025-03-28 RX ADMIN — LEVETIRACETAM 750 MG: 500 TABLET, FILM COATED ORAL at 20:04

## 2025-03-28 RX ADMIN — MIDAZOLAM 2 MG: 1 INJECTION INTRAMUSCULAR; INTRAVENOUS at 14:27

## 2025-03-28 RX ADMIN — HYDROCODONE BITARTRATE AND ACETAMINOPHEN 1 TABLET: 10; 325 TABLET ORAL at 20:04

## 2025-03-28 RX ADMIN — HYDROMORPHONE HYDROCHLORIDE 0.5 MG: 1 INJECTION, SOLUTION INTRAMUSCULAR; INTRAVENOUS; SUBCUTANEOUS at 14:40

## 2025-03-28 RX ADMIN — SODIUM CHLORIDE, PRESERVATIVE FREE 10 ML: 5 INJECTION INTRAVENOUS at 20:05

## 2025-03-28 RX ADMIN — Medication 1 CAPSULE: at 16:33

## 2025-03-28 RX ADMIN — HYDROMORPHONE HYDROCHLORIDE 0.5 MG: 1 INJECTION, SOLUTION INTRAMUSCULAR; INTRAVENOUS; SUBCUTANEOUS at 14:32

## 2025-03-28 RX ADMIN — LEVETIRACETAM 750 MG: 500 TABLET, FILM COATED ORAL at 16:31

## 2025-03-28 RX ADMIN — CEFEPIME 2000 MG: 2 INJECTION, POWDER, FOR SOLUTION INTRAVENOUS at 11:14

## 2025-03-28 RX ADMIN — ONDANSETRON 4 MG: 2 INJECTION, SOLUTION INTRAMUSCULAR; INTRAVENOUS at 14:37

## 2025-03-28 RX ADMIN — LISINOPRIL 40 MG: 40 TABLET ORAL at 16:32

## 2025-03-28 RX ADMIN — LIDOCAINE HYDROCHLORIDE 100 MG: 20 INJECTION, SOLUTION EPIDURAL; INFILTRATION; INTRACAUDAL; PERINEURAL at 14:33

## 2025-03-28 RX ADMIN — MORPHINE SULFATE 4 MG: 4 INJECTION, SOLUTION INTRAMUSCULAR; INTRAVENOUS at 08:59

## 2025-03-28 RX ADMIN — Medication 5000 UNITS: at 16:33

## 2025-03-28 RX ADMIN — FENTANYL CITRATE 25 MCG: 50 INJECTION INTRAMUSCULAR; INTRAVENOUS at 14:14

## 2025-03-28 RX ADMIN — THERA TABS 1 TABLET: TAB at 16:32

## 2025-03-28 RX ADMIN — CEFEPIME 2000 MG: 2 INJECTION, POWDER, FOR SOLUTION INTRAVENOUS at 23:41

## 2025-03-28 ASSESSMENT — PAIN SCALES - GENERAL
PAINLEVEL_OUTOF10: 3
PAINLEVEL_OUTOF10: 8
PAINLEVEL_OUTOF10: 10
PAINLEVEL_OUTOF10: 10
PAINLEVEL_OUTOF10: 6
PAINLEVEL_OUTOF10: 5
PAINLEVEL_OUTOF10: 8
PAINLEVEL_OUTOF10: 7

## 2025-03-28 ASSESSMENT — PAIN DESCRIPTION - ORIENTATION
ORIENTATION: LEFT

## 2025-03-28 ASSESSMENT — PAIN DESCRIPTION - DESCRIPTORS
DESCRIPTORS: THROBBING
DESCRIPTORS: ACHING
DESCRIPTORS: ACHING;THROBBING
DESCRIPTORS: THROBBING
DESCRIPTORS: ACHING;THROBBING
DESCRIPTORS: ACHING
DESCRIPTORS: ACHING

## 2025-03-28 ASSESSMENT — PAIN DESCRIPTION - ONSET
ONSET: ON-GOING

## 2025-03-28 ASSESSMENT — PAIN DESCRIPTION - LOCATION
LOCATION: LEG

## 2025-03-28 ASSESSMENT — PAIN DESCRIPTION - PAIN TYPE
TYPE: SURGICAL PAIN

## 2025-03-28 ASSESSMENT — PAIN DESCRIPTION - DIRECTION: RADIATING_TOWARDS: FOOT

## 2025-03-28 ASSESSMENT — PAIN DESCRIPTION - FREQUENCY
FREQUENCY: CONTINUOUS

## 2025-03-28 ASSESSMENT — PAIN - FUNCTIONAL ASSESSMENT
PAIN_FUNCTIONAL_ASSESSMENT: PREVENTS OR INTERFERES SOME ACTIVE ACTIVITIES AND ADLS
PAIN_FUNCTIONAL_ASSESSMENT: PREVENTS OR INTERFERES WITH MANY ACTIVE NOT PASSIVE ACTIVITIES
PAIN_FUNCTIONAL_ASSESSMENT: PREVENTS OR INTERFERES WITH ALL ACTIVE AND SOME PASSIVE ACTIVITIES
PAIN_FUNCTIONAL_ASSESSMENT: 0-10
PAIN_FUNCTIONAL_ASSESSMENT: PREVENTS OR INTERFERES SOME ACTIVE ACTIVITIES AND ADLS
PAIN_FUNCTIONAL_ASSESSMENT: 0-10

## 2025-03-28 ASSESSMENT — PAIN SCALES - WONG BAKER: WONGBAKER_NUMERICALRESPONSE: NO HURT

## 2025-03-28 NOTE — DISCHARGE INSTRUCTIONS
Non weight bearing to Left leg.. Knee immobilizer on all times unless showering. No bending at left knee.  Cleanse left leg wounds with antibacterial soap and water in shower daily then apply clean gauze pad with tape. Keep wounds clean .   Take antibiotics as ordered until completed  TAke pain medication as ordered and needed  Call for increase in pain or swelling in operative extremity. Call for loss of movement or sensation in operative extremity. Call for fever over 101 F.    Followup DR Bob in office in 1-2 weeks  Remi Bob [facility]  [address], [place] [address] [phone]

## 2025-03-28 NOTE — ANESTHESIA PRE PROCEDURE
[facility] Department of Anesthesiology  Pre-Anesthesia Evaluation/Consultation       Name:  Kvng Damon  : 1975  Age:  49 y.o.                                           MRN:  [id number]  Date: 3/28/2025           Surgeon: Surgeon(s):  Remi Bob MD    Procedure: Procedure(s):  LEFT LEG EXTERNAL FIXATOR REMOVAL     No Known Allergies  Patient Active Problem List   Diagnosis    Breakthrough seizure (HCC)    Essential hypertension    Seizure disorder (HCC)    Acute midline low back pain without sciatica    Illicit drug use    Chest tightness    Rupture of left quadriceps tendon    Rupture of quadriceps muscle, left, initial encounter    Traumatic medial retinacular tear of knee    Obesity, class 1    Severe sepsis with acute organ dysfunction (HCC)    Infected surgical wound     Past Medical History:   Diagnosis Date    Arthritis     Cerebral artery occlusion with cerebral infarction (HCC)     2019    Hyperlipidemia     Hypertension     Obesity     Seizures (HCC)     pt states a couple months ago- it was just a couple seconds. 25 and again 3/5/25     Past Surgical History:   Procedure Laterality Date    FRACTURE SURGERY      LEFT HIP    HAND SURGERY Right     thumb    LEG MUSCLE SURGERY Left 2025    QUADRICEPS TENDON REPAIR- LEFT KNEE performed by Naveen Biggs MD at [facility] OR    LEG MUSCLE SURGERY Left 3/14/2025    LEFT REVISION QUADRICEPS TENDON REPAIR WITH AUTOGRAFT AND AUGMENTATION, APPLICATION OF EXTERNAL FIXATION performed by Remi Bob MD at [facility] OR    MULTIPLE TOOTH EXTRACTIONS           Social History     Tobacco Use    Smoking status: Former     Passive exposure: Past    Smokeless tobacco: Never   Vaping Use    Vaping status: Never Used   Substance Use Topics    Alcohol use: Yes     Comment: a couple daily- last had 2 glasses wine  pm    Drug use: Yes     Types: Marijuana (Weed)     Comment: last used couple days ago     Medications  No current

## 2025-03-28 NOTE — PROGRESS NOTES
Pt arrived to floor from ER at 0054 via stretcher. Pt oriented to room, call light, policies and procedures, the menu and ordering. Call light within reach. Bed in lowest position, bed alarm on, and wheels locked. Pt verbalized understanding. No complaints, questions or concerns at this time.

## 2025-03-28 NOTE — H&P
V2.0  History and Physical      Name:  Kvng Damon /Age/Sex: 1975  (49 y.o. male)   MRN & CSN:  [id number] & [id number] Encounter Date/Time: 3/27/2025 11:26 PM EDT   Location:   PCP: Jak Lou MD       Hospital Day: 1    Assessment and Plan:   Kvng Damon is a 49 y.o. male with a pmh of hypertension, class I obesity, seizure disorder and with previous left quadricep tendon repair with reconstruction and external fixation presenting with infected surgical site with Severe sepsis with acute organ dysfunction (HCC)    Hospital Problems           Last Modified POA    * (Principal) Severe sepsis with acute organ dysfunction (HCC) 3/27/2025 Yes    Essential hypertension 3/27/2025 Yes    Seizure disorder (HCC) 3/27/2025 Yes    Obesity, class 1 3/27/2025 Yes       Plan:  Patient with heart rate of more than 90; leukocytosis of 14,200.  Lactic acid of more than 2, trended.  Received normal saline bolus per sepsis protocol.  Blood culture x 2 ordered at the ED.  Follow cultures.  Vancomycin IV and cefepime ordered.  IV morphine for pain.  Antiemetics and bowel protocol in place.  Blood pressure is not within goal.  Resume home blood pressure medications.  Trend.  Seizure disorder-stable.  Resume home antiseizure medications  Obesity, class I-complicates care.  Lifestyle modification recommended.      Advance care planning:  Advanced care planning was discussed with patient for a total of  16 minutes.  Full code, DNR CC, DNR CCA, power of  and living will were discussed.  Patient elected to be  a full code.   Disposition:   Current Living situation: Home  Expected Disposition: Home  Estimated D/C: 5 days    Diet Regular diet and n.p.o. after midnight   DVT Prophylaxis [] Lovenox, []  Heparin, [x] SCDs, [] Ambulation,  [] Eliquis, [] Xarelto, [] Coumadin   Code Status Full code   Surrogate Decision Maker/ POA Wife     Personally reviewed Lab Studies and Imaging     Discussed management of  exposure: Past    Smokeless tobacco: Never   Vaping Use    Vaping status: Never Used   Substance and Sexual Activity    Alcohol use: Yes     Comment: a couple daily- last had 2 glasses wine 03/13 pm    Drug use: Yes     Types: Marijuana (Weed)     Comment: last used couple days ago    Sexual activity: Not Currently     Partners: Female     Social Drivers of Health     Food Insecurity: No Food Insecurity (6/2/2024)    Received from [facility]    Hunger Vital Sign     Worried About Running Out of Food in the Last Year: Never true     Ran Out of Food in the Last Year: Never true   Transportation Needs: No Transportation Needs (6/3/2024)    Received from [facility]    PRAPARE - Transportation     Lack of Transportation (Medical): No     Lack of Transportation (Non-Medical): No    Received from [facility]    Interpersonal Safety   Housing Stability: Low Risk  (2/19/2024)    Housing Stability Vital Sign     Unable to Pay for Housing in the Last Year: No     Number of Places Lived in the Last Year: 1     Unstable Housing in the Last Year: No       Medications:   Medications:    sodium chloride  30 mL/kg (Ideal) IntraVENous Once    vancomycin  2,000 mg IntraVENous Once      Infusions:   PRN Meds:      Labs      CBC:   Recent Labs     03/27/25  2159   WBC 14.2*   HGB 12.1*   *     BMP:    Recent Labs     03/27/25  2159   *   K 3.6   CL 99   CO2 21   BUN 9   CREATININE 0.9   GLUCOSE 133*     Hepatic: No results for input(s): \"AST\", \"ALT\", \"BILITOT\", \"ALKPHOS\" in the last 72 hours.    Invalid input(s): \"ALB\"  Lipids:   Lab Results   Component Value Date/Time    CHOL 240 09/13/2016 12:38 AM    HDL 57 09/13/2016 12:38 AM    TRIG 201 09/13/2016 12:38 AM     Hemoglobin A1C: No results found for: \"LABA1C\"  TSH:   Lab Results   Component Value Date/Time    TSH 1.42 09/13/2016 12:38 AM     Troponin: No results found for: \"TROPONINT\"  Lactic Acid: No results for input(s): \"LACTA\" in the last 72

## 2025-03-28 NOTE — PROGRESS NOTES
Clinical Pharmacy Note  Vancomycin Consult    Kvng Damon is a 49 y.o. male ordered vancomycin for SSTI; consult received from Dr. Adams to manage therapy. Also receiving cefepime.    Allergies:  Patient has no known allergies.     Temp max:  Temp (24hrs), Av.1 °F (36.7 °C), Min:98.1 °F (36.7 °C), Max:98.1 °F (36.7 °C)      Recent Labs     25  2159   WBC 14.2*       Recent Labs     25  2159   BUN 9   CREATININE 0.9       No intake or output data in the 24 hours ending 25 0100    Culture Results:  Pending    Ht Readings from Last 1 Encounters:   25 1.854 m (6' 1\")        Wt Readings from Last 1 Encounters:   25 113.4 kg (250 lb)         Estimated Creatinine Clearance: 130 mL/min (based on SCr of 0.9 mg/dL).    Assessment/Plan:    Loading dose 2g 257    Day # 1 of vancomycin.  Vancomycin 1750 mg IV every 12 hours.    Goal -600  Predicted  (24-48), 565 (24,ss)    Vanc random 25 1000    Thank you for the consult.     Ashlie Singer, RomeD

## 2025-03-28 NOTE — PROGRESS NOTES
4 Eyes Skin Assessment     NAME:  Kvng Damon  YOB: 1975  MEDICAL RECORD NUMBER:  [id number]    The patient is being assessed for  Admission    I agree that at least one RN has performed a thorough Head to Toe Skin Assessment on the patient. ALL assessment sites listed below have been assessed.      Areas assessed by both nurses:    Head, Face, Ears, Shoulders, Back, Chest, Arms, Elbows, Hands, Sacrum. Buttock, Coccyx, Ischium, Legs. Feet and Heels, and Under Medical Devices         Does the Patient have a Wound? Yes wound(s) were present on assessment. LDA wound assessment was Initiated and completed by RN       Feliz Prevention initiated by RN: Yes  Wound Care Orders initiated by RN: No    Pressure Injury (Stage 3,4, Unstageable, DTI, NWPT, and Complex wounds) if present, place Wound referral order by RN under : No    New Ostomies, if present place, Ostomy referral order under : No     Nurse 1 eSignature: Electronically signed by Citlaly Jacobs RN on 3/28/25 at 1:51 AM EDT    **SHARE this note so that the co-signing nurse can place an eSignature**    Nurse 2 eSignature: Electronically signed by Lani Pickens RN on 3/28/25 at 1:53 AM EDT

## 2025-03-28 NOTE — ANESTHESIA POSTPROCEDURE EVALUATION
[facility] Department of Anesthesiology  Post-Anesthesia Note       Name:  Kvng Damon                                  Age:  49 y.o.  MRN:  [id number]     Last Vitals & Oxygen Saturation: /86   Pulse 87   Temp 97.5 °F (36.4 °C) (Temporal)   Resp 12   Ht 1.854 m (6' 1\")   Wt 113 kg (249 lb 1.9 oz)   SpO2 90%   BMI 32.87 kg/m²   Patient Vitals for the past 4 hrs:   BP Temp Temp src Pulse Resp SpO2   03/28/25 1540 136/86 -- -- 87 12 90 %   03/28/25 1539 136/86 -- -- 81 12 97 %   03/28/25 1530 (!) 144/90 -- -- 87 15 91 %   03/28/25 1525 (!) 155/108 -- -- 90 20 100 %   03/28/25 1520 (!) 142/109 -- -- 86 17 96 %   03/28/25 1515 (!) 147/90 -- -- 87 16 93 %   03/28/25 1511 (!) 152/99 -- -- 88 15 95 %   03/28/25 1510 (!) 152/99 -- -- 92 25 94 %   03/28/25 1505 (!) 151/105 97.5 °F (36.4 °C) Temporal 93 19 96 %       Level of consciousness:  Awake, alert to baseline    Respiratory: Respirations easy, no distress. Stable.    Cardiovascular: Hemodynamically stable.    Hydration: Adequate.    PONV: Adequately managed.    Post-op pain: Adequately controlled.    Post-op assessment: Tolerated anesthetic well without complication.    Complications:  None.    Og Brown MD  March 28, 2025   3:43 PM

## 2025-03-28 NOTE — CARE COORDINATION
Received a message from Kim at [employer]: patient is covered at 100% for home infusion.     Electronically signed by SAMIR Prasad, LISW, Case Management on 3/28/2025 at 4:58 PM  [place] [phone]

## 2025-03-28 NOTE — PROGRESS NOTES
Pt returned from OR per bed he is on room air his left leg dressing is is clean, dry, and intact and he has a knee immobilizer on from his groin to his ankle.

## 2025-03-28 NOTE — PROGRESS NOTES
V2.0  [facility] Hospitalist Progress Note      Name:  Kvng Damon /Age/Sex: 1975  (49 y.o. male)   MRN & CSN:  [id number] & [id number] Encounter Date/Time: 3/28/2025 11:49 AM EDT    Location:  X3L-0586/3112-01 PCP: Jak Lou MD       Hospital Day: 2  Subjective:   Chief Complaint: Follow-up left foot infection    Seen and evaluated the bedside, patient having quite a bit of pain in his left leg, appears uncomfortable, currently n.p.o., seen by surgery, plan is for ORIF sometime this afternoon  Review of Systems:    Review of Systems    10 point ROS negative except as stated above in \"subjective\" section    Objective:     Intake/Output Summary (Last 24 hours) at 3/28/2025 1149  Last data filed at 3/28/2025 0354  Gross per 24 hour   Intake 495.26 ml   Output --   Net 495.26 ml      Vitals:   Vitals:    25 1136   BP: (!) 145/86   Pulse: 86   Resp: 18   Temp: 99.1 °F (37.3 °C)   SpO2: 96%     Physical Exam:   General: Awake, alert and oriented, NAD  Cardiovascular: S1S2 present, regular rate and rhythm, no murmurs  Respiratory: Clear to auscultation  Gastrointestinal: Soft, non tender, positive bowel sounds   Genitourinary: no suprapubic tenderness  Musculoskeletal: Left lower extremity has an external fixator below his knees, there is diffuse erythema noted    Medications:     Medications:    sodium chloride flush  5-40 mL IntraVENous 2 times per day    cefepime  2,000 mg IntraVENous Q12H    vancomycin (VANCOCIN) intermittent dosing (placeholder)   Other RX Placeholder    vancomycin  1,750 mg IntraVENous Q12H    levETIRAcetam  750 mg Oral BID    lisinopril  40 mg Oral Daily    naltrexone  50 mg Oral Daily      Infusions:    sodium chloride       PRN Meds: sodium chloride flush, 5-40 mL, PRN  sodium chloride, , PRN  acetaminophen, 650 mg, Q6H PRN   Or  acetaminophen, 650 mg, Q6H PRN  HYDROcodone-acetaminophen, 1 tablet, Q4H PRN  traMADol, 50 mg, Q4H PRN  morphine, 2 mg, Q2H PRN   Or  morphine, 4

## 2025-03-28 NOTE — ED PROVIDER NOTES
[facility] [place] EMERGENCY DEPARTMENT  EMERGENCY DEPARTMENT ENCOUNTER      Pt Name: Kvng Damon  MRN: [id number]  Birthdate 1975  Date of evaluation: 3/27/2025  Provider: STARR Costello  PCP: Jak Lou MD  Note Started: 11:36 PM EDT     The ED Attending Physician was available for consultation but did not see or evaluate this patient.    CHIEF COMPLAINT       Chief Complaint   Patient presents with    Post-op Problem     Pt had to totally torn quad. had it fixed on the 14th at [place]. Pt had metal bar and screws in left leg. Pt is having drainage around screw holes.       HISTORY OF PRESENT ILLNESS   (Location, Timing/Onset, Context/Setting, Quality, Duration, Modifying Factors, Severity, Associated Signs and Symptoms)  Note limiting factors.     Kvng Damon is a 49 y.o. male who presents with complaint of pain, swelling, redness in the area of the external fixation on his left leg.  13 days ago the patient had orthopedic surgery, revision of a prior quadriceps tendon repair, with reconstruction and external fixation.  He had a follow-up appointment in the office with orthopedics 3 days ago, and they state the orthopedic doctor said it looks good.  They report that the patient has been having some pain in leg, but over the past few days there has been increasing redness and swelling surrounding the fixation, and the leg appears more swollen.  Patient says pain is worse.  He denies any traumatic injury since the follow-up visit.  Patient's wife at bedside says he has seemed a little \"out of it\" and may be little confused at times over the last day or 2.  Patient denies numbness.    Nursing Notes were all reviewed and agreed with or any disagreements were addressed in the HPI.    REVIEW OF SYSTEMS    (2-9 systems for level 4, 10 or more for level 5)     Positives and pertinent negatives as per HPI.     PAST MEDICAL HISTORY     Past Medical History:   Diagnosis Date    Arthritis      pulse on the left. Sensation to light touch grossly intact and capillary refill <3 seconds in the digits of the left lower extremity.   Skin:     General: Skin is warm and dry.   Neurological:      General: No focal deficit present.      Mental Status: He is alert and oriented to person, place, and time.   Psychiatric:         Mood and Affect: Mood normal.         Behavior: Behavior normal.                       DIAGNOSTIC RESULTS   LABS:    Labs Reviewed   CBC WITH AUTO DIFFERENTIAL - Abnormal; Notable for the following components:       Result Value    WBC 14.2 (*)     RBC 4.05 (*)     Hemoglobin 12.1 (*)     Hematocrit 36.4 (*)     Platelets 481 (*)     Neutrophils Absolute 11.0 (*)     Monocytes Absolute 1.5 (*)     All other components within normal limits   BASIC METABOLIC PANEL W/ REFLEX TO MG FOR LOW K - Abnormal; Notable for the following components:    Sodium 135 (*)     Glucose 133 (*)     All other components within normal limits   LACTATE, SEPSIS - Abnormal; Notable for the following components:    Lactic Acid, Sepsis 2.5 (*)     All other components within normal limits   SEDIMENTATION RATE - Abnormal; Notable for the following components:    Sed Rate, Automated 75 (*)     All other components within normal limits   C-REACTIVE PROTEIN - Abnormal; Notable for the following components:    .0 (*)     All other components within normal limits   CULTURE, BLOOD 2   CULTURE, BLOOD 1   CULTURE, ANAEROBIC AND AEROBIC   PROCALCITONIN   LACTATE, SEPSIS       When ordered only abnormal lab results are displayed. All other labs were within normal range or not returned as of this dictation.    EKG: When ordered, EKG's are interpreted by the Emergency Department Physician in the absence of a cardiologist.  Please see their note for interpretation of EKG.    RADIOLOGY:   All images such as plain radiographs, CT, Ultrasound and MRI are interpreted by a radiologist. Some images are visualized and preliminarily  1:    [] Lactate > 4        or   [] SBP < 90 or MAP < 65 for at        least two readings in the first        hour after fluid bolus        administration      [] Vasopressors initiated (if hypotension persists after fluid resuscitation)        [] No criteria met for Septic Shock.   Patient Vitals for the past 6 hrs:   BP Temp Pulse Resp SpO2   03/27/25 2128 -- 98.1 °F (36.7 °C) (!) 110 18 92 %   03/27/25 2129 (!) 144/92 -- -- -- --   03/27/25 2200 121/70 -- 97 17 94 %   03/27/25 2215 134/87 -- 95 20 92 %   03/27/25 2230 132/81 -- 91 16 92 %   03/27/25 2245 (!) 134/103 -- 91 20 96 %      Recent Labs     03/27/25 2159   WBC 14.2*   CREATININE 0.9   *         Time Severe Sepsis Identified: 22:30.    Fluid Resuscitation Rational: at least 30mL/kg based on ideal body weight due to obesity defined as BMI >30 (patient's BMI is There is no height or weight on file to calculate BMI. and IBW is Ideal body weight: 79.9 kg (176 lb 2.4 oz)Adjusted ideal body weight: 93.3 kg (205 lb 11 oz))      Repeat lactate level: ordered and pending at this time    Reassessment Exam:   Not applicable. Patient does not have septic shock.    Leg clearly appears to be infected on physical exam here today.  I sent a sample of pustulant discharge to the lab for culture.  Patient is tachycardic, with elevations in serum white blood cell count and lactate, meeting criteria for severe sepsis.  Was given IV antibiotics and fluids in the ED.  I consulted orthopedics to notify them of the patient's condition.  I then consulted the hospitalist, who agreed to accept and admit the patient.  The patient verbalized understanding and agreement with this plan of care.      CRITICAL CARE TIME   There was a high probability of life-threatening clinical deterioration in the patient's condition requiring my urgent intervention.  I personally saw the patient and independently provided 30 minutes of non-concurrent critical care out of the total shared

## 2025-03-28 NOTE — CONSULTS
[facility] Orthopedic Surgery  Consult Note    This patient is seen in consultation at the request of Dr Corral    Reason for Consult:  left leg infection    CHIEF COMPLAINT:  left leg pain    History Obtained From:  patient, electronic medical record    HISTORY OF PRESENT ILLNESS:    The patient is a 49 y.o. male who presents with left leg pain and swelling. He states a few weeks ago he slipped on ice and tore his quadricep . He underwent repair per Dr Biggs on 1/29/25. He had recurrent rupture and subsequent repair on 3/14/25 per Dr Bob and also was placed in external fixator for protection. The pt per notes was not doing pin care or changing dressings. . Pain is described in left thigh and knee and lower leg and with the intensity of moderate. Pain is described as aching, tender. Discomfort is persistent. Pain is mildly improved since admission. He is alert and oriented. No other complaints.     Past Medical History:        Diagnosis Date    Arthritis     Cerebral artery occlusion with cerebral infarction (HCC)     2019    Hyperlipidemia     Hypertension     Obesity     Seizures (HCC)     pt states a couple months ago- it was just a couple seconds. 1/28/25 and again 3/5/25       Past Surgical History:        Procedure Laterality Date    FRACTURE SURGERY      LEFT HIP    HAND SURGERY Right 1997    thumb    LEG MUSCLE SURGERY Left 01/29/2025    QUADRICEPS TENDON REPAIR- LEFT KNEE performed by Naveen Biggs MD at [facility] OR    LEG MUSCLE SURGERY Left 3/14/2025    LEFT REVISION QUADRICEPS TENDON REPAIR WITH AUTOGRAFT AND AUGMENTATION, APPLICATION OF EXTERNAL FIXATION performed by Remi Bob MD at [facility] OR    MULTIPLE TOOTH EXTRACTIONS      2015       Social History     Tobacco Use    Smoking status: Former     Passive exposure: Past    Smokeless tobacco: Never   Substance Use Topics    Alcohol use: Yes     Comment: a couple daily- last had 2 glasses wine 03/13 pm       Family History   Problem Relation Age

## 2025-03-28 NOTE — PLAN OF CARE
Problem: Chronic Conditions and Co-morbidities  Goal: Patient's chronic conditions and co-morbidity symptoms are monitored and maintained or improved  Outcome: Progressing  Flowsheets (Taken 3/28/2025 0054)  Care Plan - Patient's Chronic Conditions and Co-Morbidity Symptoms are Monitored and Maintained or Improved: Monitor and assess patient's chronic conditions and comorbid symptoms for stability, deterioration, or improvement     Problem: Discharge Planning  Goal: Discharge to home or other facility with appropriate resources  Outcome: Progressing  Flowsheets (Taken 3/28/2025 0054)  Discharge to home or other facility with appropriate resources: Identify barriers to discharge with patient and caregiver     Problem: Pain  Goal: Verbalizes/displays adequate comfort level or baseline comfort level  Outcome: Progressing  Flowsheets (Taken 3/28/2025 0054)  Verbalizes/displays adequate comfort level or baseline comfort level: Assess pain using appropriate pain scale     Problem: Skin/Tissue Integrity  Goal: Skin integrity remains intact  Description: 1.  Monitor for areas of redness and/or skin breakdown  2.  Assess vascular access sites hourly  3.  Every 4-6 hours minimum:  Change oxygen saturation probe site  4.  Every 4-6 hours:  If on nasal continuous positive airway pressure, respiratory therapy assess nares and determine need for appliance change or resting period  Outcome: Progressing  Flowsheets (Taken 3/28/2025 0402)  Skin Integrity Remains Intact: Monitor for areas of redness and/or skin breakdown     Problem: Safety - Adult  Goal: Free from fall injury  Outcome: Progressing  Flowsheets (Taken 3/28/2025 0402)  Free From Fall Injury: Instruct family/caregiver on patient safety     Problem: ABCDS Injury Assessment  Goal: Absence of physical injury  Outcome: Progressing  Flowsheets (Taken 3/28/2025 0402)  Absence of Physical Injury: Implement safety measures based on patient assessment

## 2025-03-28 NOTE — DISCHARGE INSTR - COC
Continuity of Care Form    Patient Name: Kvng Damon   :  1975  MRN:  [id number]    Admit date:  3/27/2025  Discharge date:  ***    Code Status Order: Full Code   Advance Directives:    Date/Time Healthcare Directive Type of Healthcare Directive Copy in Chart Healthcare Agent Appointed Healthcare Agent's Name Healthcare Agent's Phone Number    25 1226 No, patient does not have an advance directive for healthcare treatment  --  --  --  --  --     25 0404 No, patient does not have an advance directive for healthcare treatment  --  --  --  --  --             Admitting Physician:  William Adams MD  PCP: Jak Lou MD    Discharging Nurse: ***  Discharging Hospital Unit/Room#: OR/NONE  Discharging Unit Phone Number: ***    Emergency Contact:   Extended Emergency Contact Information  Primary Emergency Contact: iza smith Phone: [phone]  Relation: Other    Past Surgical History:  Past Surgical History:   Procedure Laterality Date    FRACTURE SURGERY      LEFT HIP    HAND SURGERY Right     thumb    LEG MUSCLE SURGERY Left 2025    QUADRICEPS TENDON REPAIR- LEFT KNEE performed by Naveen Biggs MD at [facility] OR    LEG MUSCLE SURGERY Left 3/14/2025    LEFT REVISION QUADRICEPS TENDON REPAIR WITH AUTOGRAFT AND AUGMENTATION, APPLICATION OF EXTERNAL FIXATION performed by Remi Bob MD at [facility] OR    MULTIPLE TOOTH EXTRACTIONS             Immunization History:   Immunization History   Administered Date(s) Administered    COVID-19, J&J, (age 18y+), IM, 0.5 mL 2021    TDaP, ADACEL (age 10y-64y), BOOSTRIX (age 10y+), IM, 0.5mL 2013    Td vaccine (adult) 10/28/1999    Tetanus 2008       Active Problems:  Patient Active Problem List   Diagnosis Code    Breakthrough seizure (HCC) G40.919    Essential hypertension I10    Seizure disorder (HCC) G40.909    Acute midline low back pain without sciatica M54.50    Illicit drug use F19.90    Chest  tightness R07.89    Rupture of left quadriceps tendon S76.112A    Rupture of quadriceps muscle, left, initial encounter S76.112A    Traumatic medial retinacular tear of knee S86.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    Obesity, class 1 E66.811    Severe sepsis with acute organ dysfunction (HCC) A41.9, R65.20    Infected surgical wound T81.49XA       Isolation/Infection:   Isolation            No Isolation          Patient Infection Status    None to display              Nurse Assessment:  Last Vital Signs: BP (!) 145/86   Pulse 86   Temp 99.1 °F (37.3 °C) (Oral)   Resp 18   Ht 1.854 m (6' 1\")   Wt 113 kg (249 lb 1.9 oz)   SpO2 96%   BMI 32.87 kg/m²     Last documented pain score (0-10 scale): Pain Level: 8  Last Weight:   Wt Readings from Last 1 Encounters:   03/28/25 113 kg (249 lb 1.9 oz)     Mental Status:  {IP PT MENTAL STATUS:21498}    IV Access:  { EZEQUIEL IV ACCESS:405026861}    Nursing Mobility/ADLs:  Walking   {CHP DME ADLs:829820717}  Transfer  {CHP DME ADLs:738984852}  Bathing  {CHP DME ADLs:022655518}  Dressing  {CHP DME ADLs:621170955}  Toileting  {CHP DME ADLs:821644831}  Feeding  {CHP DME ADLs:997478319}  Med Admin  {CHP DME ADLs:995237033}  Med Delivery   { EZEQUIEL MED Delivery:486541150}    Wound Care Documentation and Therapy:  Incision 01/29/25 Knee Left (Active)   Number of days: 57       Incision 03/14/25 Knee Left;Anterior (Active)   Dressing Status New drainage noted 03/28/25 0850   Incision Cleansed Chlorhexidine 03/28/25 0054   Dressing/Treatment Open to air 03/28/25 0850   Closure Steri-Strips;Other (Comment) 03/28/25 0850   Margins Approximated 03/28/25 0850   Incision Assessment Erythema 03/28/25 0850   Drainage Amount Small (< 25%) 03/28/25 0850   Drainage Description Sanguinous 03/28/25 0850   Odor None 03/28/25 0850   Number of days: 14        Elimination:  Continence:   Bowel: {YES / NO:46291}  Bladder: {YES / NO:19727}  Urinary Catheter: {Urinary Catheter:071855593}   Colostomy/Ileostomy/Ileal Conduit: {YES /

## 2025-03-28 NOTE — PROGRESS NOTES
Patient admitted to PACU # 8 from OR at 1505 post LEFT LEG EXTERNAL FIXATOR REMOVAL - Left  per Dr Bob.  Attached to PACU monitoring system and report received from anesthesia provider.  Patient was reported to be hemodynamically stable during procedure.  Patient drowsy on admission and rating pain 10/10. Pain medication given per CRNA. Will monitor.

## 2025-03-28 NOTE — CONSULTS
Clinical Pharmacy Note  Vancomycin Consult    Pharmacy consult received for one-time dose of vancomycin in the Emergency Department per STARR Lara.    Ht Readings from Last 1 Encounters:   03/24/25 1.854 m (6' 1\")        Wt Readings from Last 1 Encounters:   03/24/25 113.4 kg (250 lb)         Assessment/Plan:  Vancomycin 2g x 1 in ED.  If vancomycin is to continue on admission and pharmacy is to manage dosing, please re-consult with admission orders.    Ashlie Singer, RomeD

## 2025-03-28 NOTE — ED NOTES
ED TO INPATIENT SBAR HANDOFF    Patient Name: Kvng Damon   Preferred Name: Fitz  : 1975  49 y.o.   Family/Caregiver Present: no   Code Status Order: Prior  PO Status: NPO:No  Telemetry Order:   C-SSRS: Risk of Suicide: No Risk  Sitter no   Restraints:     Sepsis Risk Score      Situation  Chief Complaint   Patient presents with    Post-op Problem     Pt had to totally torn quad. had it fixed on the 14th at [place]. Pt had metal bar and screws in left leg. Pt is having drainage around screw holes.     Mental Status: oriented, alert, coherent, logical, thought processes intact, and able to concentrate and follow conversation  Arrived from:Home  Imaging:   No orders to display     Abnormal labs:   Abnormal Labs Reviewed   CBC WITH AUTO DIFFERENTIAL - Abnormal; Notable for the following components:       Result Value    WBC 14.2 (*)     RBC 4.05 (*)     Hemoglobin 12.1 (*)     Hematocrit 36.4 (*)     Platelets 481 (*)     Neutrophils Absolute 11.0 (*)     Monocytes Absolute 1.5 (*)     All other components within normal limits   BASIC METABOLIC PANEL W/ REFLEX TO MG FOR LOW K - Abnormal; Notable for the following components:    Sodium 135 (*)     Glucose 133 (*)     All other components within normal limits   LACTATE, SEPSIS - Abnormal; Notable for the following components:    Lactic Acid, Sepsis 2.5 (*)     All other components within normal limits   SEDIMENTATION RATE - Abnormal; Notable for the following components:    Sed Rate, Automated 75 (*)     All other components within normal limits   C-REACTIVE PROTEIN - Abnormal; Notable for the following components:    .0 (*)     All other components within normal limits       Background  Allergies: No Known Allergies  History:   Past Medical History:   Diagnosis Date    Arthritis     Cerebral artery occlusion with cerebral infarction (HCC)     2019    Hyperlipidemia     Hypertension     Obesity     Seizures (HCC)     pt states a couple months ago- it  was just a couple seconds. 1/28/25 and again 3/5/25       Assessment  Vitals:    Level of Consciousness: Alert (0)   Vitals:    03/27/25 2200 03/27/25 2215 03/27/25 2230 03/27/25 2245   BP: 121/70 134/87 132/81 (!) 134/103   Pulse: 97 95 91 91   Resp: 17 20 16 20   Temp:       TempSrc:       SpO2: 94% 92% 92% 96%     Deterioration Index (DI): Deterioration Index: 23.34  Deterioration Index (DI) Interventions Performed:    O2 Flow Rate:    O2 Device:    Cardiac Rhythm:    Critical Lab Results: [unfilled]  Cultures: Cultures:Blood and Wound  NIH Score: NIH     Active LDA's:   Peripheral IV 03/27/25 Distal;Right Cephalic (Active)     Active Central Lines:                          Active Wounds:    Active Bradshaw's:    Active Feeding Tubes:      Administered Medications:   Medications   vancomycin (VANCOCIN) 2000 mg in 400 mL IVPB (2,000 mg IntraVENous New Bag 3/27/25 2337)   morphine (PF) injection 2 mg (has no administration in time range)     Or   morphine (PF) injection 4 mg (has no administration in time range)   sodium chloride 0.9 % bolus 2,397 mL (0 mLs IntraVENous Stopped 3/27/25 2353)   morphine (PF) injection 6 mg (6 mg IntraVENous Given 3/27/25 2201)   ceFEPIme (MAXIPIME) 2,000 mg in sodium chloride 0.9 % 100 mL IVPB (addEASE) (0 mg IntraVENous Stopped 3/27/25 2353)         se call Sending RN at [phone]            Electronically signed by: Electronically signed by Ashanti Thomas RN on 3/28/2025 at 12:16 AM

## 2025-03-28 NOTE — CARE COORDINATION
Case Management Assessment  Initial Evaluation    Date/Time of Evaluation: 3/28/2025 2:23 PM  Assessment Completed by: CLEM Quinones    If patient is discharged prior to next notation, then this note serves as note for discharge by case management.    Patient Name: Kvng Damon                   YOB: 1975  Diagnosis: Left leg cellulitis [L03.116]  Severe sepsis with acute organ dysfunction (HCC) [A41.9, R65.20]  Infection at site of external fixator pin, initial encounter [T84.7XXA]  Sepsis without acute organ dysfunction, due to unspecified organism (HCC) [A41.9]                   Date / Time: 3/27/2025  9:18 PM    Patient Admission Status: Inpatient   Readmission Risk (Low < 19, Mod (19-27), High > 27): Readmission Risk Score: 14.8    Current PCP: Jak Lou MD  PCP verified by CM? Yes    Chart Reviewed: Yes      History Provided by: Significant Other, Medical Record (Patient currently sleeping and going to surgery this afternoon)  Patient Orientation: Alert and Oriented    Patient Cognition: Alert    Hospitalization in the last 30 days (Readmission):  No    If yes, Readmission Assessment in  Navigator will be completed.    Advance Directives:      Code Status: Full Code   Patient's Primary Decision Maker is: Legal Next of Kin    Primary Decision Maker: iza smith - [phone]    Discharge Planning:    Patient lives with: Spouse/Significant Other Type of Home: House  Primary Care Giver: Self  Patient Support Systems include: Family Members, Children, Spouse/Significant Other   Current Financial resources: Other (Comment) ([place])  Current community resources: None  Current services prior to admission: Durable Medical Equipment            Current DME: Cane, Walker (Quad cane; Rolling Walker)            Type of Home Care services:  None    ADLS  Prior functional level: Independent in ADLs/IADLs  Current functional level: Assistance with the following:, Housework, Cooking,

## 2025-03-28 NOTE — PROGRESS NOTES
Report called to shadi on [address]. Vitals stable. Pain tolerable. Knee immobilizer in pain. Will transport and monitor.

## 2025-03-28 NOTE — PROGRESS NOTES
Messaged md due to pt complaints of PRN pain \"not helping\" with his pain, orders placed. See eMAR

## 2025-03-29 VITALS
HEIGHT: 73 IN | SYSTOLIC BLOOD PRESSURE: 124 MMHG | TEMPERATURE: 97.7 F | DIASTOLIC BLOOD PRESSURE: 76 MMHG | OXYGEN SATURATION: 96 % | BODY MASS INDEX: 33.16 KG/M2 | RESPIRATION RATE: 17 BRPM | HEART RATE: 84 BPM | WEIGHT: 250.22 LBS

## 2025-03-29 LAB
CREAT SERPL-MCNC: 0.6 MG/DL (ref 0.9–1.3)
GFR SERPLBLD CREATININE-BSD FMLA CKD-EPI: >90 ML/MIN/{1.73_M2}
VANCOMYCIN SERPL-MCNC: 9 UG/ML

## 2025-03-29 PROCEDURE — 94760 N-INVAS EAR/PLS OXIMETRY 1: CPT

## 2025-03-29 PROCEDURE — 97166 OT EVAL MOD COMPLEX 45 MIN: CPT

## 2025-03-29 PROCEDURE — 97530 THERAPEUTIC ACTIVITIES: CPT

## 2025-03-29 PROCEDURE — 82565 ASSAY OF CREATININE: CPT

## 2025-03-29 PROCEDURE — 36415 COLL VENOUS BLD VENIPUNCTURE: CPT

## 2025-03-29 PROCEDURE — 2580000003 HC RX 258: Performed by: HOSPITALIST

## 2025-03-29 PROCEDURE — 97116 GAIT TRAINING THERAPY: CPT

## 2025-03-29 PROCEDURE — 6370000000 HC RX 637 (ALT 250 FOR IP): Performed by: HOSPITALIST

## 2025-03-29 PROCEDURE — 80202 ASSAY OF VANCOMYCIN: CPT

## 2025-03-29 PROCEDURE — 2500000003 HC RX 250 WO HCPCS: Performed by: HOSPITALIST

## 2025-03-29 PROCEDURE — 6370000000 HC RX 637 (ALT 250 FOR IP): Performed by: NURSE PRACTITIONER

## 2025-03-29 PROCEDURE — 97162 PT EVAL MOD COMPLEX 30 MIN: CPT

## 2025-03-29 PROCEDURE — 6360000002 HC RX W HCPCS: Performed by: HOSPITALIST

## 2025-03-29 RX ADMIN — HYDROCODONE BITARTRATE AND ACETAMINOPHEN 1 TABLET: 10; 325 TABLET ORAL at 05:00

## 2025-03-29 RX ADMIN — Medication 5000 UNITS: at 08:27

## 2025-03-29 RX ADMIN — SODIUM CHLORIDE, PRESERVATIVE FREE 10 ML: 5 INJECTION INTRAVENOUS at 08:27

## 2025-03-29 RX ADMIN — HYDROCODONE BITARTRATE AND ACETAMINOPHEN 1 TABLET: 10; 325 TABLET ORAL at 09:19

## 2025-03-29 RX ADMIN — LEVETIRACETAM 750 MG: 500 TABLET, FILM COATED ORAL at 08:27

## 2025-03-29 RX ADMIN — HYDROCODONE BITARTRATE AND ACETAMINOPHEN 1 TABLET: 10; 325 TABLET ORAL at 18:03

## 2025-03-29 RX ADMIN — HYDROCODONE BITARTRATE AND ACETAMINOPHEN 1 TABLET: 10; 325 TABLET ORAL at 01:11

## 2025-03-29 RX ADMIN — LISINOPRIL 40 MG: 40 TABLET ORAL at 08:27

## 2025-03-29 RX ADMIN — VANCOMYCIN 1750 MG: 1.25 INJECTION, SOLUTION INTRAVENOUS at 13:09

## 2025-03-29 RX ADMIN — CEFEPIME 2000 MG: 2 INJECTION, POWDER, FOR SOLUTION INTRAVENOUS at 15:14

## 2025-03-29 RX ADMIN — THERA TABS 1 TABLET: TAB at 08:37

## 2025-03-29 RX ADMIN — Medication 1 CAPSULE: at 08:26

## 2025-03-29 RX ADMIN — HYDROCODONE BITARTRATE AND ACETAMINOPHEN 1 TABLET: 10; 325 TABLET ORAL at 13:41

## 2025-03-29 ASSESSMENT — PAIN SCALES - GENERAL
PAINLEVEL_OUTOF10: 7
PAINLEVEL_OUTOF10: 0
PAINLEVEL_OUTOF10: 6
PAINLEVEL_OUTOF10: 6
PAINLEVEL_OUTOF10: 0
PAINLEVEL_OUTOF10: 0
PAINLEVEL_OUTOF10: 6

## 2025-03-29 ASSESSMENT — PAIN DESCRIPTION - ONSET
ONSET: ON-GOING

## 2025-03-29 ASSESSMENT — PAIN DESCRIPTION - FREQUENCY
FREQUENCY: CONTINUOUS

## 2025-03-29 ASSESSMENT — PAIN DESCRIPTION - DESCRIPTORS
DESCRIPTORS: ACHING
DESCRIPTORS: DISCOMFORT
DESCRIPTORS: ACHING

## 2025-03-29 ASSESSMENT — PAIN - FUNCTIONAL ASSESSMENT
PAIN_FUNCTIONAL_ASSESSMENT: PREVENTS OR INTERFERES SOME ACTIVE ACTIVITIES AND ADLS

## 2025-03-29 ASSESSMENT — PAIN DESCRIPTION - LOCATION
LOCATION: LEG

## 2025-03-29 ASSESSMENT — PAIN SCALES - WONG BAKER
WONGBAKER_NUMERICALRESPONSE: NO HURT
WONGBAKER_NUMERICALRESPONSE: NO HURT

## 2025-03-29 ASSESSMENT — PAIN DESCRIPTION - PAIN TYPE
TYPE: ACUTE PAIN
TYPE: SURGICAL PAIN
TYPE: SURGICAL PAIN

## 2025-03-29 ASSESSMENT — PAIN DESCRIPTION - ORIENTATION
ORIENTATION: LEFT

## 2025-03-29 NOTE — PROGRESS NOTES
Left a message for patient to contact office to schedule an appt, office number provided.      Occupational Therapy    [facility] - Occupational Therapy   Phone: [phone]    Occupational Therapy  Facility/Department:[address] ORTHOPEDICS    [x] Initial Evaluation            [] Daily Treatment Note         [] Discharge Summary      Patient: Kvng Damon   : 1975   MRN: [id number]   Date of Service:  3/29/2025    Staff Mobility Recommendation: min A with RW and gait belt    AM-PAC Score: 20/24  Discharge Recommendations: home with 24/7 supervision/assist and HHOT  Kvng Damon scored a 20/24 on the AM-PAC ADL Inpatient form. Current research shows that an AM-PAC score of 18 or greater is typically associated with a discharge to the patient's home setting. Based on the patient's AM-PAC score, and their current ADL deficits, it is recommended that the patient have 2-3 sessions per week of Occupational Therapy at d/c to increase the patient's independence.  At this time, this patient demonstrates the endurance and safety to discharge home with HHOT and a follow up treatment frequency of 2-3x/wk.   Please see assessment section for further patient specific details.    If patient discharges prior to next session this note will serve as a discharge summary.  Please see below for the latest assessment towards goals.      Admitting Diagnosis:  Severe sepsis with acute organ dysfunction (HCC)  Ordering Physician: CLARKE Virgen   Current Admission Summary: Kvng Damon is a 49 y.o. male who presents with complaint of pain, swelling, redness in the area of the external fixation on his left leg. Patient with a history of left quadriceps rupture, status postinitial repair in 2025, reruptured and repaired on 3/14/2025 with external fixator in place for mobility. Seems the patient may have been infection, CRP significantly elevated, white count normal, orthopedic following, on broad-spectrum antibiotics in the form of vancomycin and cefepime, plan is for the patient to go to the  weight bearing, ROM restrictions  Weight Bearing Restrictions: non weight bearing  to LLE  Required Braces/Orthotics: (L) knee immobilizer  Positional Restrictions: no L knee flexion    Non weight bearing to Left leg.. Knee immobilizer on all times unless showering. No bending at left knee.     Home Environment / Functional History  Lives With: Son (there \"most of the time\" and can help with physical assist)  Type of Home: House  Home Layout: Able to Live on Main level with bedroom/bathroom, Two level  Home Access: Level entry  Bathroom Shower/Tub: Tub/Shower unit  Bathroom Toilet: Handicap height  Bathroom Equipment: Grab bars in shower, grab bar in front of toilet  Home Equipment: Walker - Rolling, Cane  Has the patient had two or more falls in the past year or any fall with injury in the past year?: Yes (had a seizure)  Prior Level of Assist for ADLs:  (occasional assist for dressing)  Prior Level of Assist for Homemaking: Needs assistance  Prior Level of Assist for Ambulation: Independent household ambulator, with or without device (RW)  Prior Level of Assist for Transfers: Independent  Active : Yes  Occupation: Full time employment  Additional Comments: just started going to OP the week prior to tearing it again    Available Assistance at Discharge: available PRN    Examination   Vision:   Vision: Impaired  Vision Exceptions: Wears glasses at all times (or contact)  Hearing: WFL          Observation:   General Observation:  knee immobilizer L  - required readjustment in bed prior to out of bed activity    Sensation:   WFL    ROM:   (B) UE AROM WFL  Strength:   (B) UE strength grossly WFL    Therapist Clinical Decision Making (Complexity): medium complexity        Subjective  General: Pt sleeping upon arrival, easily awakened and agreeable to evaluation.  Family/Caregiver Present: No  Pain: 6/10.  Location: L knee  Pain Interventions: RN notified and repositioned        Activities of Daily Living  Basic  understanding, patient verbalizes understanding, would benefit from continued reinforcement  Safety Interventions: patient left in chair, chair alarm in place, call light within reach, gait belt, and patient at risk for falls; RN aware    Plan  Frequency: 5-7 x/week  Current Treatment Recommendations: balance training, functional mobility training, transfer training, ADL/self-care training, IADL training, home management training, safety education, and equipment evaluation/education    Goals  Patient Goals: to discharge home   Short Term Goals:  Time Frame: By acute discharge  Patient will complete lower body dressing with supervision   Patient will complete upper body dressing with modified independent  Patient will complete toileting with modified independent  Patient will complete bathing with supervision  Patient will complete grooming with modified independent  Patient will complete functional transfers with modified independent  Patient will complete functional mobility with modified independent    Above goals reviewed on 3/29/2025.  All goals are ongoing at this time unless indicated above.       Therapy Session Time     Individual Group Co-treatment   Time In    0807   Time Out   0854   Minutes    47           Total Treatment Minutes:  47 minutes       Minutes per charge:  Moderate complexity eval: 15 minutes  Therapeutic activity: 32 minutes      Electronically signed by Ailin Glover OT on 3/29/2025 at 11:51 AM

## 2025-03-29 NOTE — PROGRESS NOTES
Antibiotics finished, PIV removed, patient was assisted to get dressed. Patient will wait for his significant other to arrive to be discharged home.

## 2025-03-29 NOTE — PROGRESS NOTES
Brief Ortho Note    Patient can be discharged today. Discharge medications have been electronically sent to pharmacy on file.

## 2025-03-29 NOTE — PLAN OF CARE
Problem: Chronic Conditions and Co-morbidities  Goal: Patient's chronic conditions and co-morbidity symptoms are monitored and maintained or improved  3/29/2025 1121 by Martine Wilson  Outcome: Progressing  Flowsheets (Taken 3/28/2025 2004 by Lani Pickens RN)  Care Plan - Patient's Chronic Conditions and Co-Morbidity Symptoms are Monitored and Maintained or Improved:   Collaborate with multidisciplinary team to address chronic and comorbid conditions and prevent exacerbation or deterioration   Monitor and assess patient's chronic conditions and comorbid symptoms for stability, deterioration, or improvement   Update acute care plan with appropriate goals if chronic or comorbid symptoms are exacerbated and prevent overall improvement and discharge  3/29/2025 0236 by Lani Pickens RN  Outcome: Progressing  Flowsheets  Taken 3/28/2025 2004 by Lani Pickens RN  Care Plan - Patient's Chronic Conditions and Co-Morbidity Symptoms are Monitored and Maintained or Improved:   Collaborate with multidisciplinary team to address chronic and comorbid conditions and prevent exacerbation or deterioration   Monitor and assess patient's chronic conditions and comorbid symptoms for stability, deterioration, or improvement   Update acute care plan with appropriate goals if chronic or comorbid symptoms are exacerbated and prevent overall improvement and discharge  Taken 3/28/2025 1600 by Leonila Silva RN  Care Plan - Patient's Chronic Conditions and Co-Morbidity Symptoms are Monitored and Maintained or Improved:   Monitor and assess patient's chronic conditions and comorbid symptoms for stability, deterioration, or improvement   Collaborate with multidisciplinary team to address chronic and comorbid conditions and prevent exacerbation or deterioration   Update acute care plan with appropriate goals if chronic or comorbid symptoms are exacerbated and prevent overall improvement and discharge     Problem: Discharge  Planning  Goal: Discharge to home or other facility with appropriate resources  3/29/2025 1121 by Martine Wilson  Outcome: Progressing  Flowsheets (Taken 3/28/2025 2004 by Lani Pickens, RN)  Discharge to home or other facility with appropriate resources:   Identify barriers to discharge with patient and caregiver   Arrange for needed discharge resources and transportation as appropriate   Identify discharge learning needs (meds, wound care, etc)     Problem: Pain  Goal: Verbalizes/displays adequate comfort level or baseline comfort level  3/29/2025 1121 by Martine Wilson  Outcome: Progressing  Flowsheets (Taken 3/29/2025 0236 by Lani Pickens, RN)  Verbalizes/displays adequate comfort level or baseline comfort level:   Encourage patient to monitor pain and request assistance   Assess pain using appropriate pain scale   Administer analgesics based on type and severity of pain and evaluate response   Implement non-pharmacological measures as appropriate and evaluate response  Note: Assess pain level using appropriate pain scale. Pt is able to verbalize pain level.      Problem: Skin/Tissue Integrity  Goal: Skin integrity remains intact  Description: 1.  Monitor for areas of redness and/or skin breakdown  2.  Assess vascular access sites hourly  3.  Every 4-6 hours minimum:  Change oxygen saturation probe site  4.  Every 4-6 hours:  If on nasal continuous positive airway pressure, respiratory therapy assess nares and determine need for appliance change or resting period  3/29/2025 1121 by Martine Wilson  Outcome: Progressing  Flowsheets (Taken 3/29/2025 0236 by Lani Pickens, RN)  Skin Integrity Remains Intact: Monitor for areas of redness and/or skin breakdown     Problem: Safety - Adult  Goal: Free from fall injury  3/29/2025 1121 by Martine Wilson  Outcome: Progressing  Flowsheets (Taken 3/29/2025 1121)  Free From Fall Injury: Instruct family/caregiver on patient safety  Note: Pt educated on potential

## 2025-03-29 NOTE — DISCHARGE SUMMARY
V2.0  Discharge Summary    Name:  Kvng Damon /Age/Sex: 1975 (49 y.o. male)   Admit Date: 3/27/2025  Discharge Date: 3/29/25    MRN & CSN:  [id number] & [id number] Encounter Date and Time 3/29/25 10:35 AM EDT    Attending:  Vitor Corral MD Discharging Provider: Vitor Corral MD     Discharge Diagnosis:     # Left lower extremity cellulitis  # Left quadriceps rupture, s/p repair  # Hypertension  # Seizure disorder  # Obesity, BMI 32.8    Consultants:  PHARMACY TO DOSE VANCOMYCIN  PHARMACY TO DOSE VANCOMYCIN    Brief HPI:    Per admitting H and P...\"Kvng Damon is a 49 y.o. male with pmh  a pmh of hypertension, class I obesity, seizure disorder and with previous left quadricep tendon repair with reconstruction and external fixation presenting with infected surgical site that started 3 days before presentation.  Next day after removal of dressing patient wife noticed that the dressing looks more of a pus.  Area is tender and patient has been running a fever.  ED provider discussed case with orthopedic surgeon who will see patient in AM. \"      Brief hospital course:     Please refer to the admitting H and P for details surrounding the initial presentation.     Patient with a history of left quadriceps rupture, status postinitial repair in 2025, reruptured and re-repaired on 3/14/2025 with external fixator in place for mobility.  There were concerns of infection at presentation CRP significantly elevated, white count normal, seen by orthopedic service, was taken to the OR, the external fixator was removed, culture sent, culture thus far growing MSSA, currently has an immobilizer in place, from orthopedic standpoint, patient can be discharged home, patient is comfortable with that, orthopedic service already has called in Bactrim, and the staff even if it is MRSA should be susceptible to it, patient has had pain medications as ordered by orthopedic service.  Patient has follow-up with them, is

## 2025-03-29 NOTE — PROGRESS NOTES
Pt alert and oriented x4. Pt reports left leg pain- rates pain 6/10-pt medicated with prn pain med as ordered. Pt is NWB on left leg-pt verbalized understandings and able to ambulate with PT/OT at bedside. Pt denies other complains at this time. Pt resting in chair. Denies other needs at this time. Chair alarm active. Call light and item need in reach. Electronically signed by Vadim Meehan RN on 3/29/2025 at 4:39 PM

## 2025-03-29 NOTE — PROGRESS NOTES
PT AAO x4. VSS. Pt tolerating diet. Pt c/o of continues pain left leg. Elevation , rest and PRN medication per MD orders completed. Effective. Pt walking up 2x with  copious amounts of sweat. Skin cleaned and new bedding replace. Ace and dry dressing removed from left leg. Skin cleaned with CHG and dry gauze dressing placed. Pt tolerating fair. Immobilizer replaced and remains on throughout shift, Pt resting well. No further needs voiced at this time. Fall precautions in place. Bed alarm on. Call light within reach. Will continue care. Electronically signed by Lani Pickens RN on 3/29/2025 at 6:38 AM

## 2025-03-29 NOTE — PROGRESS NOTES
Physical Therapy    [facility] - Physical Therapy   Phone: [phone]    Physical Therapy  Facility/Department:[address] ORTHOPEDICS    [x] Initial Evaluation            [] Daily Treatment Note         [] Discharge Summary      Patient: Kvng Damon   : 1975   MRN: [id number]   Date of Service:  3/29/2025  Staff Mobility Recommendation: min A with RW and gait belt    AM-PAC score: 16  Discharge Recommendations: Home with / supervision/physical assist and HH PT    Admitting Diagnosis: Severe sepsis with acute organ dysfunction (HCC)  Ordering Physician: Dena Laureano, SOSA-CNP  Current Admission Summary: The patient is a 49 y.o. male who presents with left leg pain and swelling. In , he slipped on ice and tore his quadricep . He underwent repair per Dr Biggs on 25. He had a fall from an epileptic seizure, resulting in recurrent rupture and subsequent repair on 3/14/25 per Dr Bob and also was placed in external fixator for protection.Pt. is s/p removal of external fixator on 3/28/25.     Past Medical History:  has a past medical history of Arthritis, Cerebral artery occlusion with cerebral infarction (HCC), Hyperlipidemia, Hypertension, Obesity, and Seizures ([facility]).  Past Surgical History:  has a past surgical history that includes fracture surgery; Leg Muscle Surgery (Left, 2025); Multiple tooth extractions; Hand surgery (Right, ); and Leg Muscle Surgery (Left, 3/14/2025).    Assessment  Activity Tolerance: Good  Impairments Requiring Therapeutic Intervention: decreased functional mobility, decreased ADL status, decreased ROM, decreased strength, decreased safety awareness, decreased endurance  Prognosis: good    Clinical Assessment: Pt. tolerated evaluation well without adverse effects. Pt. was mildly impulsive at times and required cues for walker safety. He was able to maintain NWB L LE with gait in room. He would benefit from assist at home and HH PT upon D/C  transfers and gait  Therapist Clinical Decision Making (Complexity): medium complexity  Clinical Presentation: evolving      Subjective  General: Pt. Agreeable to therapy. He reported pain 6/10 at rest.  Family/Caregiver present: No  Pain: 6/10.  Location: L knee  Pain Interventions: RN notified      Functional Mobility  Bed Mobility:  Supine to Sit: stand by assistance, head of bed elevated   Transfers:  Sit to stand transfer: contact guard assistance, minimal assistance  Stand to sit transfer: contact guard assistance, minimal assistance  Comments: cues for hand placement  Ambulation:  Surface:level surface  Assistive Device: rolling walker  Other Appliance: knee immobilizer  Assistance: contact guard assistance, minimal assistance  Distance: 20' x2  Gait Mechanics:  Pt. Demonstrated hopping on R LE with RW; able to maintain NWB L LE.  Comments: cues for decreasing speed and keeping RW close to him. Pt. progressed from min A initially to CGA with further gait.  Balance:  Static Sitting Balance: good: independent with functional balance in unsupported position  Dynamic Sitting Balance: good: independent with functional balance in unsupported position  Static Standing Balance: poor (+): requires min (A) to maintain balance  Dynamic Standing Balance: poor (+): requires min (A) to maintain balance  Comments: use of RW for standing balance    Cognition  WFL  Orientation:    alert and oriented x 4    Education  Barriers To Learning: none  Patient Education: patient educated on goals, PT role and benefits, plan of care, weight-bearing education, general safety, functional mobility training, proper use of assistive device/equipment, transfer training, discharge recommendations  Learning Assessment:  patient verbalizes and demonstrates understanding  Safety Interventions: patient left in chair, chair alarm in place, call light within reach, gait belt, patient at risk for falls, and nurse notified    Plan  Frequency: 5-7

## 2025-03-29 NOTE — CARE COORDINATION
CASE MANAGEMENT DISCHARGE SUMMARY:    DISCHARGE DATE: 3/29/2025    DISCHARGED TO HOME     TRANSPORTATION: family                      COMMENTS: Spoke with patient. He plans to return home today. He will be on PO antibiotics. Discussed home care; provided list.  Patient reported that he does not want to make a decision about home care at this time. He will think about it. Explained to patient that if he wants home care later, he will need to call his PCP to set up home care. He acknowledged his understanding. Rn informed.    Electronically signed by SAMIR Prasad, CLEM, Case Management on 3/29/2025 at 12:34 PM  [place] [phone]

## 2025-03-29 NOTE — PROGRESS NOTES
All verbal and written discharge instructions given to the pt at discharge regarding new meds, changes in home meds, and follow up appointment. Pt also educated on importance of NWB on left leg. Pt instructed not to bend his left knee per ortho MD instructions-pt verbalized understandings and denies other needs at discharge. Electronically signed by Vadim Meehan RN on 3/29/2025 at 6:36 PM

## 2025-03-29 NOTE — PLAN OF CARE
Problem: Chronic Conditions and Co-morbidities  Goal: Patient's chronic conditions and co-morbidity symptoms are monitored and maintained or improved  Outcome: Progressing  Flowsheets (Taken 3/28/2025 1600 by Leonila Silva, RN)  Care Plan - Patient's Chronic Conditions and Co-Morbidity Symptoms are Monitored and Maintained or Improved:   Monitor and assess patient's chronic conditions and comorbid symptoms for stability, deterioration, or improvement   Collaborate with multidisciplinary team to address chronic and comorbid conditions and prevent exacerbation or deterioration   Update acute care plan with appropriate goals if chronic or comorbid symptoms are exacerbated and prevent overall improvement and discharge     Problem: Discharge Planning  Goal: Discharge to home or other facility with appropriate resources  Outcome: Progressing  Flowsheets (Taken 3/28/2025 1600 by Leonila Silva, RN)  Discharge to home or other facility with appropriate resources:   Identify barriers to discharge with patient and caregiver   Arrange for needed discharge resources and transportation as appropriate   Identify discharge learning needs (meds, wound care, etc)   Arrange for interpreters to assist at discharge as needed   Refer to discharge planning if patient needs post-hospital services based on physician order or complex needs related to functional status, cognitive ability or social support system     Problem: Pain  Goal: Verbalizes/displays adequate comfort level or baseline comfort level  Outcome: Progressing  Flowsheets (Taken 3/29/2025 0236)  Verbalizes/displays adequate comfort level or baseline comfort level:   Encourage patient to monitor pain and request assistance   Assess pain using appropriate pain scale   Administer analgesics based on type and severity of pain and evaluate response   Implement non-pharmacological measures as appropriate and evaluate response     Problem: Skin/Tissue Integrity  Goal: Skin

## 2025-03-30 LAB
BACTERIA SPEC AEROBE CULT: ABNORMAL
BACTERIA SPEC ANAEROBE CULT: ABNORMAL
GRAM STN SPEC: ABNORMAL
ORGANISM: ABNORMAL

## 2025-03-31 ENCOUNTER — OFFICE VISIT (OUTPATIENT)
Dept: ORTHOPEDIC SURGERY | Age: 50
End: 2025-03-31

## 2025-03-31 VITALS — HEIGHT: 73 IN | WEIGHT: 250 LBS | BODY MASS INDEX: 33.13 KG/M2

## 2025-03-31 DIAGNOSIS — G89.18 POST-OP PAIN: Primary | ICD-10-CM

## 2025-03-31 LAB
BACTERIA BLD CULT ORG #2: NORMAL
BACTERIA BLD CULT: NORMAL

## 2025-03-31 PROCEDURE — 99024 POSTOP FOLLOW-UP VISIT: CPT | Performed by: ORTHOPAEDIC SURGERY

## 2025-03-31 RX ORDER — TRAMADOL HYDROCHLORIDE 50 MG/1
50 TABLET ORAL EVERY 4 HOURS PRN
Qty: 30 TABLET | Refills: 0 | Status: SHIPPED | OUTPATIENT
Start: 2025-03-31 | End: 2025-04-11 | Stop reason: SDUPTHER

## 2025-03-31 NOTE — OP NOTE
Operative Note      Patient: Kvng Damon  YOB: 1975  MRN: [id number]    Date of Procedure: 3/28/2025    Pre-Op Diagnosis Codes:      * Acute sepsis (HCC) [A41.9]    Post-Op Diagnosis: Same       Procedure(s):  LEFT LEG EXTERNAL FIXATOR REMOVAL    Surgeon(s):  Remi Bob MD    Assistant:   Surgical Assistant: Ronen Rodríguez    Anesthesia: General    Estimated Blood Loss (mL): Minimal    Complications: None    Specimens:   * No specimens in log *    Implants:  * No implants in log *      Drains: * No LDAs found *    Findings:  Infection Present At Time Of Surgery (PATOS) (choose all levels that have infection present):  No infection present  Other Findings: per dictation    Detailed Description of Procedure:   The patient was met in the preoperative holding area.  Informed consent was obtained.  He was taken back to the operative room.  General anesthesia was performed.  Lower extremities prepped and draped.  Formal timeout was performed.  Prior to prepping draping the external bars of the fixator were removed.  At that point we just had to remove the pins from the femur as well as the tibia.  There were 4 total.  These were successfully removed.  Curetting of the pin sites were performed.  We did remove all nonviable tissue with a curette and thoroughly performed a debridement.  This was followed by irrigation.  Sterile dressings were provide informed Xeroform, 4 x 4's, ABD, Webril and Ace.  The patient is essentially active and taken recovery room in excellent condition.  Any procedure all counts were correct.  Postoperatively patient will continue to be nonweightbearing with the knee extended in a knee immobilizer.       Electronically signed by Remi Bob MD on 3/31/2025 at 3:37 PM

## 2025-04-01 ENCOUNTER — RESULTS FOLLOW-UP (OUTPATIENT)
Dept: EMERGENCY DEPT | Age: 50
End: 2025-04-01

## 2025-04-02 DIAGNOSIS — T84.7XXA INFECTION AT SITE OF EXTERNAL FIXATOR PIN, INITIAL ENCOUNTER: ICD-10-CM

## 2025-04-02 RX ORDER — HYDROCODONE BITARTRATE AND ACETAMINOPHEN 10; 325 MG/1; MG/1
1 TABLET ORAL EVERY 6 HOURS PRN
Qty: 20 TABLET | Refills: 0 | Status: SHIPPED | OUTPATIENT
Start: 2025-04-02 | End: 2025-04-07

## 2025-04-02 NOTE — TELEPHONE ENCOUNTER
Prescription Refill     Medication Name:  HYDROCODONE 10/325    Pharmacy: [facility] [id number] - [facility] [address] - [phone] - F [phone] [address] [place] [address]  Phone: [phone]  Fax: [phone]     Patient Contact Number:  [phone]       PATIENT IS REQ A REFILL OF HYDROCODONE 10/325      MAY BE REACHED AT THE ABOVE NUMBER     [FreeTextEntry1] : EKG per cardiology\par

## 2025-04-07 DIAGNOSIS — T84.7XXA INFECTION AT SITE OF EXTERNAL FIXATOR PIN, INITIAL ENCOUNTER: ICD-10-CM

## 2025-04-07 RX ORDER — HYDROCODONE BITARTRATE AND ACETAMINOPHEN 10; 325 MG/1; MG/1
1 TABLET ORAL EVERY 6 HOURS PRN
Qty: 20 TABLET | Refills: 0 | Status: SHIPPED | OUTPATIENT
Start: 2025-04-07 | End: 2025-04-12

## 2025-04-07 NOTE — PROGRESS NOTES
3/31/2025     Reason for visit:  Status post left knee revision quadriceps tendon repair with hamstring autograft augmentation and external fixation on 3/14/2025  Status post removal of left lower extremity Ex-Fix with I&D pin sites on 3/28/2025    History of Present Illness:  Patient returns for postoperative evaluation.  Overall he is doing well and reports he is doing much better since Ex-Fix removal.  No fever or chills.  He is cleaning the pin sites daily.    Objective:  Ht 1.854 m (6' 1\")   Wt 113.4 kg (250 lb)   BMI 32.98 kg/m²      Physical Exam:  The patient is well-appearing and in no apparent distress  Examination of the left knee   There is a small effusion, no gross deformity or skin changes  Pin sites appear to be clean and healthy  5 out of 5 strength throughout distal muscle groups  Sensation is intact to light touch throughout all distributions  There is no calf swelling or tenderness  Palpable DP pulse, brisk cap refill, 2+ symmetric reflexes       Assessment:  Status post left knee revision quadriceps tendon repair with hamstring autograft augmentation and external fixation on 3/14/2025  Status post removal of left lower extremity Ex-Fix with I&D pin sites on 3/28/2025    Plan:  Patient is doing well.  He will continue antibiotics that have been prescribed.  We will maintain full extension knee immobilizer for 6 weeks minimum.  We have initiate physical therapy focused on quad contractions and straight leg raises in the brace.  Continue nonweightbearing.  Return to see me in 2 to 3 weeks.                 Remi Bob MD            Orthopaedic Surgery Sports Medicine and Arthroscopy            [facility]            Team Physician [facility] ([place])      Disclaimer:  This note was dictated with voice recognition software.  Though review and correction are routine, we apologize for any errors.

## 2025-04-07 NOTE — TELEPHONE ENCOUNTER
Prescription Refill      Medication Name: [id number]  Pharmacy: [facility]  Patient Contact Number: [phone]

## 2025-04-11 DIAGNOSIS — G89.18 POST-OP PAIN: ICD-10-CM

## 2025-04-11 RX ORDER — TRAMADOL HYDROCHLORIDE 50 MG/1
50 TABLET ORAL EVERY 4 HOURS PRN
Qty: 30 TABLET | Refills: 0 | Status: SHIPPED | OUTPATIENT
Start: 2025-04-11 | End: 2025-04-16

## 2025-04-11 NOTE — TELEPHONE ENCOUNTER
Prescription Refill     Medication Name:  TRAMADOL  Pharmacy: [facility]  Patient Contact Number:  [phone]         Medication Name:  NEW ANTIBIOTIC  Pharmacy: [facility]  Patient Contact Number:  [phone]

## 2025-04-14 ENCOUNTER — OFFICE VISIT (OUTPATIENT)
Dept: ORTHOPEDIC SURGERY | Age: 50
End: 2025-04-14

## 2025-04-14 VITALS — HEIGHT: 73 IN | WEIGHT: 250 LBS | BODY MASS INDEX: 33.13 KG/M2

## 2025-04-14 DIAGNOSIS — T84.7XXA INFECTION AT SITE OF EXTERNAL FIXATOR PIN, INITIAL ENCOUNTER: ICD-10-CM

## 2025-04-14 DIAGNOSIS — S76.112A RUPTURE OF QUADRICEPS MUSCLE, LEFT, INITIAL ENCOUNTER: Primary | ICD-10-CM

## 2025-04-14 PROCEDURE — 99024 POSTOP FOLLOW-UP VISIT: CPT | Performed by: ORTHOPAEDIC SURGERY

## 2025-04-14 RX ORDER — HYDROCODONE BITARTRATE AND ACETAMINOPHEN 5; 325 MG/1; MG/1
1 TABLET ORAL EVERY 6 HOURS PRN
Qty: 20 TABLET | Refills: 0 | Status: SHIPPED | OUTPATIENT
Start: 2025-04-14 | End: 2025-04-19

## 2025-04-15 NOTE — PROGRESS NOTES
Physician Progress Note      PATIENT:               KVNG DAMON  CSN #:                  [id number]  :                       1975  ADMIT DATE:       3/27/2025 9:18 PM  DISCH DATE:        3/29/2025 8:00 PM  RESPONDING  PROVIDER #:        Vitor Corral MD          QUERY TEXT:    Severe sepsis was documented in the medical record H&P.  The diagnosis was not   noted in subsequent documentation.  Please clarify the status of this   condition.    The clinical indicators include:  -\"Patient is tachycardic, with elevations in serum white blood cell count and   lactate, meeting criteria for severe sepsis.  Was given IV antibiotics and   fluids in the ED. No acute organ dysfunction\" (ED Provider Note 3/27, Ramirez Lara)    \"Kvng Damon is a 49 y.o. male with a pmh of hypertension, class I   obesity, seizure disorder and with previous left quadricep tendon repair with   reconstruction and external fixation presenting with infected surgical site   with Severe sepsis with acute organ dysfunction\" (H&P)    Lactate: 2.5, 1.6 for 3/27; WBC: 14.2, 10.3 for 3/27-3/28 (From labs)  HR: 110, 106 on 3/27-3/28; Temp: F 99.1 on 3/28 (from Vital Signs)  Treatment: cefepime IV, vancomycin IV, cefazolin IV  Options provided:  -- Severe sepsis confirmed as evidenced by, Please specify acute organ   dysfunction:  -- Severe sepsis ruled out after study  -- Sepsis without severe sepsis  -- Other - I will add my own diagnosis  -- Disagree - Not applicable / Not valid  -- Disagree - Clinically unable to determine / Unknown  -- Refer to Clinical Documentation Reviewer    PROVIDER RESPONSE TEXT:    Sepsis without severe sepsis    Query created by: Mariella Mora on 2025 10:09 AM      Electronically signed by:  Vitor Corral MD 4/15/2025 12:25 PM

## 2025-04-21 ENCOUNTER — TELEPHONE (OUTPATIENT)
Dept: ORTHOPEDIC SURGERY | Age: 50
End: 2025-04-21

## 2025-04-21 DIAGNOSIS — S76.112A RUPTURE OF LEFT QUADRICEPS TENDON, INITIAL ENCOUNTER: Primary | ICD-10-CM

## 2025-04-21 RX ORDER — ONDANSETRON 4 MG/1
4 TABLET, FILM COATED ORAL DAILY PRN
Qty: 30 TABLET | Refills: 0 | Status: SHIPPED | OUTPATIENT
Start: 2025-04-21

## 2025-04-21 RX ORDER — TRAMADOL HYDROCHLORIDE 50 MG/1
50 TABLET ORAL EVERY 4 HOURS PRN
Qty: 30 TABLET | Refills: 0 | Status: SHIPPED | OUTPATIENT
Start: 2025-04-21 | End: 2025-04-28

## 2025-04-21 NOTE — TELEPHONE ENCOUNTER
Spoke to patient and informed him that he should reach out to his PCP regarding options for pain management. I explained that Dr Bob only prescribes short term following surgery and will need to continue to taper his medications down. He also stated he is taking large amounts of tylenol and ibuprofen per day. Explained the importance of taking  medication only as prescribed as excessive amounts of those 2 medications can be harmful to his organs. Also explained that he would need to stop taking the ibuprofen all together as he can not take that with the diclofenac that was prescribed today. He verbalized understanding.     Zofran order pended.

## 2025-04-21 NOTE — PROGRESS NOTES
4/14/2025     Reason for visit:  Status post left knee revision quadriceps tendon repair with hamstring autograft augmentation and external fixation on 3/14/2025  Status post removal of left lower extremity Ex-Fix with I&D pin sites on 3/28/2025    History of Present Illness:  Patient returns for postoperative evaluation.  Overall he is doing well and reports he is doing much better since Ex-Fix removal.  No fever or chills.  He is cleaning the pin sites daily.    Objective:  Ht 1.854 m (6' 1\")   Wt 113.4 kg (250 lb)   BMI 32.98 kg/m²      Physical Exam:  The patient is well-appearing and in no apparent distress  Examination of the left knee   There is a small effusion, no gross deformity or skin changes  Pin sites appear to be clean and healthy  5 out of 5 strength throughout distal muscle groups  Sensation is intact to light touch throughout all distributions  There is no calf swelling or tenderness  Palpable DP pulse, brisk cap refill, 2+ symmetric reflexes     AP and lateral x-ray of the left knee was obtained the office today on 4/14/2025.  Postoperative changes present with no evidence of fracture or dislocation.    Assessment:  Status post left knee revision quadriceps tendon repair with hamstring autograft augmentation and external fixation on 3/14/2025  Status post removal of left lower extremity Ex-Fix with I&D pin sites on 3/28/2025    Plan:  Patient is doing well.  He will return to see me in 2 weeks.  At that time we will not obtain x-ray.  Will convert him to a hinged knee brace and initiate motion.  Will also make him weightbearing as tolerated that point as well.                 Remi Bob MD            Orthopaedic Surgery Sports Medicine and Arthroscopy            [facility]            Team Physician [facility] ([place])      Disclaimer:  This note was dictated with voice recognition software.  Though review and correction are

## 2025-04-21 NOTE — TELEPHONE ENCOUNTER
Spoke to patient and gave him the 2 options as instructed. Patient stated he wanted to make a complaint because he doesn't understand how he can go from \"200 mg to 100 mg over 8 days down to 0.\" I informed patient that we can not continue to prescribe him narcotics that if he felt that he was in enough unmanageable pain, we would need to see him and investigate why pain is not reducing. Patient then asked for a referral to pain management, the prescriptions provide advised for patient, and a returned phone call with the pain management recommendation. I sent prescription to provider to sign and send to the pharmacy patient confirmed.

## 2025-04-21 NOTE — TELEPHONE ENCOUNTER
Prescription Refill     Medication Name: OXYCODONE   Pharmacy: [facility] [id number] [address] [address] [phone] - F [phone]   Patient Contact Number:  [phone]

## 2025-04-22 PROBLEM — T84.7XXA INFECTION AT SITE OF EXTERNAL FIXATOR PIN: Status: ACTIVE | Noted: 2025-04-22

## 2025-04-28 ENCOUNTER — OFFICE VISIT (OUTPATIENT)
Dept: ORTHOPEDIC SURGERY | Age: 50
End: 2025-04-28
Payer: COMMERCIAL

## 2025-04-28 VITALS — HEIGHT: 73 IN | WEIGHT: 250 LBS | BODY MASS INDEX: 33.13 KG/M2

## 2025-04-28 DIAGNOSIS — S76.112A RUPTURE OF LEFT QUADRICEPS TENDON, INITIAL ENCOUNTER: Primary | ICD-10-CM

## 2025-04-28 DIAGNOSIS — S76.112A RUPTURE OF QUADRICEPS MUSCLE, LEFT, INITIAL ENCOUNTER: ICD-10-CM

## 2025-04-28 PROCEDURE — 99024 POSTOP FOLLOW-UP VISIT: CPT | Performed by: ORTHOPAEDIC SURGERY

## 2025-04-28 PROCEDURE — L1833 KO ADJ JNT POS R SUP PRE OTS: HCPCS | Performed by: ORTHOPAEDIC SURGERY

## 2025-04-28 NOTE — PROGRESS NOTES
4/28/2025     Reason for visit:  Status post left knee revision quadriceps tendon repair with hamstring autograft augmentation and external fixation on 3/14/2025  Status post removal of left lower extremity Ex-Fix with I&D pin sites on 3/28/2025    History of Present Illness:  Patient returns for postoperative evaluation.  Overall he is doing well and reports he is doing much better since Ex-Fix removal.  No fever or chills.  He is cleaning the pin sites daily.    Objective:  Ht 1.854 m (6' 1\")   Wt 113.4 kg (250 lb)   BMI 32.98 kg/m²      Physical Exam:  The patient is well-appearing and in no apparent distress  Examination of the left knee   There is a trace effusion, no gross deformity or skin changes  Pin sites appear to be clean and healthy  Patient is able to perform and maintain a straight leg raise without extensor lag  5 out of 5 strength throughout distal muscle groups  Sensation is intact to light touch throughout all distributions  There is no calf swelling or tenderness  Palpable DP pulse, brisk cap refill, 2+ symmetric reflexes     AP and lateral x-ray of the left knee was obtained the office today on 4/14/2025.  Postoperative changes present with no evidence of fracture or dislocation.    Assessment:  Status post left knee revision quadriceps tendon repair with hamstring autograft augmentation and external fixation on 3/14/2025  Status post removal of left lower extremity Ex-Fix with I&D pin sites on 3/28/2025    Plan:  Patient is doing well.  We will convert him to a hinged knee brace and initiate physical therapy working on range of motion as tolerated.  He will remove the brace for sleeping purposes.  He will return to see me in 6 weeks.                 Remi Bob MD            Orthopaedic Surgery Sports Medicine and Arthroscopy            [facility]            Team Physician [facility] ([place])      Disclaimer:  This note was

## 2025-05-01 ENCOUNTER — HOSPITAL ENCOUNTER (OUTPATIENT)
Dept: PHYSICAL THERAPY | Age: 50
Setting detail: THERAPIES SERIES
Discharge: HOME | End: 2025-05-01
Attending: ORTHOPAEDIC SURGERY
Payer: COMMERCIAL

## 2025-05-01 DIAGNOSIS — M25.562 ACUTE PAIN OF LEFT KNEE: Primary | ICD-10-CM

## 2025-05-01 DIAGNOSIS — R26.2 DIFFICULTY WALKING UP STAIRS: ICD-10-CM

## 2025-05-01 DIAGNOSIS — R26.2 DIFFICULTY WALKING: ICD-10-CM

## 2025-05-01 DIAGNOSIS — M25.60 STIFFNESS IN JOINT: ICD-10-CM

## 2025-05-01 DIAGNOSIS — R53.1 WEAKNESS GENERALIZED: ICD-10-CM

## 2025-05-01 DIAGNOSIS — R26.2 DIFFICULTY WALKING DOWN STAIRS: ICD-10-CM

## 2025-05-01 PROCEDURE — 97110 THERAPEUTIC EXERCISES: CPT | Performed by: PHYSICAL THERAPIST

## 2025-05-01 PROCEDURE — 97161 PT EVAL LOW COMPLEX 20 MIN: CPT | Performed by: PHYSICAL THERAPIST

## 2025-05-01 NOTE — PLAN OF CARE
Tissue Mobilization, Dry Needling/IASTM, and Trigger Point Release  Modalities as needed that may include: Cryotherapy, Electrical Stimulation, Thermal Agents, and Vasoneumatic Compression  Patient education on joint protection, activity modification, and progression of HEP    Plan: POC initiated as per evaluation    Electronically Signed by Yoana Kingston, PT  Date: 05/01/2025     Note: Portions of this note have been templated and/or copied from initial evaluation, reassessments and prior notes for documentation efficiency.    Note: If patient does not return for scheduled/recommended follow up visits, this note will serve as a discharge from care along with the most recent update on progress.    Ortho Evaluation

## 2025-05-02 NOTE — PROGRESS NOTES
Physician Progress Note      PATIENT:               JOHNATHAN DIAZ  CSN #:                  [id number]  :                       1975  ADMIT DATE:       3/27/2025 9:18 PM  DISCH DATE:        3/29/2025 8:00 PM  RESPONDING  PROVIDER #:        Remi Bob MD          QUERY TEXT:    Debridement of femur is documented in the medical record Orthopedic Surgery Op   Note 3/28 by Remi Bob MD.  To accurately reflect the procedure   performed please document if debridement was excisional or nonexcisional and   the deepest depth of tissue removed as down to and including:    The clinical indicators include:  -\"Lower extremities prepped and draped.  Formal timeout was performed.  Prior   to prepping draping the external bars of the fixator were removed.  At that   point we just had to remove the pins from the femur as well as the tibia.    There were 4 total.  These were successfully removed.  Curetting of the pin   sites were performed.  We did remove all nonviable tissue with a curette and   thoroughly performed a debridement.  This was followed by irrigation.\"   (Orthopedic Surgery Op Note 3/28 by Remi Bob MD)  Options provided:  -- Nonexcisional debridement of bone  -- Excisional debridement of bone  -- Nonexcisional debridement of skin  -- Excisional debridement of skin  -- Nonexcisional debridement of subcutaneous tissue  -- Excisional debridement of subcutaneous tissue  -- Nonexcisional debridement of fascia  -- Excisional debridement of fascia  -- Nonexcisional debridement of muscle  -- Excisional debridement of muscle  -- Other - I will add my own diagnosis  -- Disagree - Not applicable / Not valid  -- Disagree - Clinically unable to determine / Unknown  -- Refer to Clinical Documentation Reviewer    PROVIDER RESPONSE TEXT:    Excisional debridement of subcutaneous tissue was performed    Query created by: Mariella Mora on 2025 12:06 PM      Electronically signed by:  Remi Bob MD

## 2025-05-05 ENCOUNTER — TELEPHONE (OUTPATIENT)
Dept: ORTHOPEDIC SURGERY | Age: 50
End: 2025-05-05

## 2025-05-05 DIAGNOSIS — S76.112A RUPTURE OF LEFT QUADRICEPS TENDON, INITIAL ENCOUNTER: Primary | ICD-10-CM

## 2025-05-05 RX ORDER — TRAMADOL HYDROCHLORIDE 50 MG/1
50 TABLET ORAL EVERY 4 HOURS PRN
Qty: 18 TABLET | Refills: 0 | Status: SHIPPED | OUTPATIENT
Start: 2025-05-05 | End: 2025-05-08

## 2025-05-05 NOTE — TELEPHONE ENCOUNTER
Prescription Refill     Medication Name:  TRAMADOL 50MG    Pharmacy: [facility] [id number] [address] [address] [phone] - F [phone]    Patient Contact Number:  [phone]       PATIENT IS REQ A REFILL OF TRAMADOL.

## 2025-05-05 NOTE — TELEPHONE ENCOUNTER
TRIED TO CALL PATIENT AND NOTIFY HIM THAT THIS IS THE LAST PRESCRIPTION WE CAN GIVE HIM AND CONFIRM PHARMACY. LVM

## 2025-05-07 ENCOUNTER — HOSPITAL ENCOUNTER (OUTPATIENT)
Dept: PHYSICAL THERAPY | Age: 50
Setting detail: THERAPIES SERIES
Discharge: HOME | End: 2025-05-07
Attending: ORTHOPAEDIC SURGERY
Payer: COMMERCIAL

## 2025-05-07 PROCEDURE — 97110 THERAPEUTIC EXERCISES: CPT | Performed by: SPECIALIST/TECHNOLOGIST

## 2025-05-07 NOTE — FLOWSHEET NOTE
[facility] - Outpatient Rehabilitation and Therapy: [address], [place] [address] office: [phone] fax: [phone]           Physical Therapy: TREATMENT/PROGRESS NOTE   Patient: Kvng Damon (49 y.o. male)   Examination Date: 2025   :  1975 MRN: [id number]   Visit #: 3   Insurance Allowable Auth Needed   20 a year  Auth 12 visits 25 - 25 [x]Yes    []No    Insurance: Pay[place]: [place] BC[place] / Plan: [place] / Product Type: *No Product type* /   Insurance ID: [id number] - ([place] [employer])  Secondary Insurance (if applicable):    Treatment Diagnosis:     ICD-10-CM    1. Acute pain of left knee  M25.562       2. Stiffness in joint  M25.60       3. Weakness generalized  R53.1       4. Difficulty walking  R26.2       5. Difficulty walking up stairs  R26.2       6. Difficulty walking down stairs  R26.2          Medical Diagnosis:  Left leg pain [M79.605]   Referring Physician: Remi Bob MD  PCP: Jak Lou MD     Plan of care signed (Y/N):     Date of Patient follow up with Physician:      Plan of Care Report: NO  POC update due: (10 visits /OR AUTH LIMITS, whichever is less)  2025                                             Medical History:  Comorbidities:  Other: Epilepsy  Relevant Medical History: see intake form                                         Precautions/ Contra-indications:           Latex allergy:  NO  Pacemaker:    NO  Contraindications for Manipulation: None, NA, and recent surgical history (relative)  Date of Surgery: 3/14/25, revision of L QT repair from    Other:    Red Flags:  None    Suicide Screening:   The patient did not verbalize a primary behavioral concern, suicidal ideation, suicidal intent, or demonstrate suicidal behaviors.    Preferred Language for Healthcare:   [x] English       [] other:    SUBJECTIVE EXAMINATION     Patient stated complaint: Pt. Reports his knee feels great.  Pt. Reports his knee does get

## 2025-05-14 ENCOUNTER — HOSPITAL ENCOUNTER (OUTPATIENT)
Dept: PHYSICAL THERAPY | Age: 50
Setting detail: THERAPIES SERIES
Discharge: HOME | End: 2025-05-14
Attending: ORTHOPAEDIC SURGERY
Payer: COMMERCIAL

## 2025-05-14 PROCEDURE — 97110 THERAPEUTIC EXERCISES: CPT | Performed by: PHYSICAL THERAPIST

## 2025-05-14 NOTE — FLOWSHEET NOTE
[facility] - Outpatient Rehabilitation and Therapy: [address],  [address] office: [phone] fax: [phone]           Physical Therapy: TREATMENT/PROGRESS NOTE   Patient: Kvng Damon (50 y.o. male)   Examination Date: 2025   :  1975 MRN: [id number]   Visit #:   Insurance Allowable Auth Needed   20 a year  Auth 12 visits 25 - 25 [x]Yes    []No    Insurance: [place] / Plan[place] / Product Type: *No Product type* /   Insurance ID: [id number] - ([employer] [place] [employer])  Secondary Insurance (if applicable):    Treatment Diagnosis:     ICD-10-CM    1. Acute pain of left knee  M25.562       2. Stiffness in joint  M25.60       3. Weakness generalized  R53.1       4. Difficulty walking  R26.2       5. Difficulty walking up stairs  R26.2       6. Difficulty walking down stairs  R26.2          Medical Diagnosis:  Post-op pain [G89.18]   Referring Physician: Remi Bob MD  PCP: Jak Lou MD     Plan of care signed (Y/N):     Date of Patient follow up with Physician:      Plan of Care Report: NO  POC update due: (10 visits /OR AUTH LIMITS, whichever is less)  2025                                             Medical History:  Comorbidities:  Other: Epilepsy  Relevant Medical History: see intake form                                         Precautions/ Contra-indications:           Latex allergy:  NO  Pacemaker:    NO  Contraindications for Manipulation: None, NA, and recent surgical history (relative)  Date of Surgery: 3/14/25, revision of L QT repair from    Other:    Red Flags:  None    Suicide Screening:   The patient did not verbalize a primary behavioral concern, suicidal ideation, suicidal intent, or demonstrate suicidal behaviors.    Preferred Language for Healthcare:   [x] English       [] other:    SUBJECTIVE EXAMINATION     Patient stated complaint: 9 weeks po. Pt states that his knee is sore today but thinks he has

## 2025-05-21 ENCOUNTER — HOSPITAL ENCOUNTER (OUTPATIENT)
Dept: PHYSICAL THERAPY | Age: 50
Setting detail: THERAPIES SERIES
Discharge: HOME | End: 2025-05-21
Attending: ORTHOPAEDIC SURGERY
Payer: COMMERCIAL

## 2025-05-21 PROCEDURE — 97110 THERAPEUTIC EXERCISES: CPT | Performed by: PHYSICAL THERAPIST

## 2025-05-21 NOTE — FLOWSHEET NOTE
but was sore on Monday, not sure why, soreness has been improving.     Test used Initial score  5/1/25 05/21/2025   Pain Summary VAS 2/10 4/10   Functional questionnaire LEFS 25/80=31.25% (68.75% deficit)    Other:              OBJECTIVE EXAMINATION     5/7/25  ROM/Strength: (Blank cells denote NT)      Mvmt (norm) AROM L AROM R Notes PROM L PROM R Notes     LUMBAR Flex (90)         Ext (25)         SB (25)          Rotation (30)             HIP Flex (120)          Abd (45)          ER (50)          IR (45)          Ext (20)         KNEE Flex (140) 100 deg supine    106 ERMI     Ext (0)     0      ANKLE DF (20)          PF (50)          Inversion (30)          Eversion (20)             MMT L          MMT  R Notes     LUMBAR  Flexion       Extension       Lateral flexion        Rotation          MMT L MMT R Notes       HIP  Flexion        Abduction        ER        IR        Extension      KNEE  Flexion        Extension        ANKLE  DF        PF        Inversion        Eversion        Repeated Movements: [] Normal  [] Abnormal [] N/A    Palpation:   Patient reported tenderness with palpation  Location:generalized soreness around incision    Posture:   WNL    Bandages/Dressings/Incisions:  Patients wound/incision appears to be healing as expected    Dermatomes: Abnormal findings listed below  All WNL    Myotomes: Abnormal findings listed below  All WNL    Reflexes: Abnormal findings listed below  Not Tested    Specific Joint Mobility Testing/Accessory Motions:      N/A    Special Tests:  N/A    Gait:    Pattern: antalgic pattern  Assistive Device Used: Large base quad cane (LBQC) on right    Balance:  [x] WNL      [] NT       [] Dysfunction noted  Comment:     Falls Risk Assessment (30 days):   Increased risk d/t recent surgery and use of brace + AD  Time Up and Go (TUG):   Not Assessed        Exercises/Interventions     Therapeutic Ex (21407)  resistance Notes/Cues/Progressions   HS stretch HEP    Calf stretch HEP

## 2025-05-28 ENCOUNTER — HOSPITAL ENCOUNTER (OUTPATIENT)
Dept: PHYSICAL THERAPY | Age: 50
Setting detail: THERAPIES SERIES
Discharge: HOME | End: 2025-05-28
Attending: ORTHOPAEDIC SURGERY
Payer: COMMERCIAL

## 2025-05-28 PROCEDURE — 97110 THERAPEUTIC EXERCISES: CPT | Performed by: PHYSICAL THERAPIST

## 2025-05-28 NOTE — FLOWSHEET NOTE
[facility] - Outpatient Rehabilitation and Therapy: [address], [address], [place] [address] office: [phone] fax: [phone]     Physical Therapy Re-Certification Plan of Care    Dear Remi Bob MD  ,    We had the pleasure of treating the following patient for physical therapy services at [facility]. A summary of our findings can be found in the updated assessment below.  This includes our plan of care.  If you have any questions or concerns regarding these findings, please do not hesitate to contact me at the office phone number checked above.  Thank you for the referral.     Physician Signature:________________________________Date:__________________  By signing above (or electronic signature), therapist's plan is approved by physician      Total Visits: 6     Overall Response to Treatment:  Patient is responding well to treatment and improvement is noted with regards to goals    Recommendation:    [x] Continue PT 1x / wk for 8 weeks. (Limited by insurance visits)  [] Hold PT, pending MD visit   [] Discharge to [facility]. Follow up with PT or MD PRN.     Physical Therapy: TREATMENT/PROGRESS NOTE   Patient: Kvng Damon (50 y.o. male)   Examination Date: 2025   :  1975 MRN: [id number]   Visit #:   Insurance Allowable Auth Needed   20 a year  Auth 12 visits 25 - 25 [x]Yes    []No    Insurance: [place] / Plan[place] / Product Type: *No Product type* /   Insurance ID: [id number] - ([facility])  Secondary Insurance (if applicable):    Treatment Diagnosis:     ICD-10-CM    1. Acute pain of left knee  M25.562       2. Stiffness in joint  M25.60       3. Weakness generalized  R53.1       4. Difficulty walking  R26.2       5. Difficulty walking up stairs  R26.2       6. Difficulty walking down stairs  R26.2          Medical Diagnosis:  Post-op pain [G89.18] L knee   Referring Physician: Remi Bob MD  PCP:

## 2025-05-28 NOTE — FLOWSHEET NOTE
[facility] - Outpatient Rehabilitation and Therapy: [address], [address], [place] [address] office: [phone] fax: [phone]     Physical Therapy Re-Certification Plan of Care    Dear Remi Bob MD  ,    We had the pleasure of treating the following patient for physical therapy services at [facility]. A summary of our findings can be found in the updated assessment below.  This includes our plan of care.  If you have any questions or concerns regarding these findings, please do not hesitate to contact me at the office phone number checked above.  Thank you for the referral.     Physician Signature:________________________________Date:__________________  By signing above (or electronic signature), therapist's plan is approved by physician      Total Visits: 6     Overall Response to Treatment:  Patient is responding well to treatment and improvement is noted with regards to goals    Recommendation:    [x] Continue PT 1x / wk for 8 weeks. (Limited by insurance visits)  [] Hold PT, pending MD visit   [] Discharge to [facility]. Follow up with PT or MD PRN.     Physical Therapy: TREATMENT/PROGRESS NOTE   Patient: Kvng Damon (50 y.o. male)   Examination Date: 2025   :  1975 MRN: [id number]   Visit #:   Insurance Allowable Auth Needed   20 a year  Auth 12 visits 25 - 25 [x]Yes    []No    Insurance: [place] / Plan[place] / Product Type: *No Product type* /   Insurance ID: [id number] - ([facility])  Secondary Insurance (if applicable):    Treatment Diagnosis:     ICD-10-CM    1. Acute pain of left knee  M25.562       2. Stiffness in joint  M25.60       3. Weakness generalized  R53.1       4. Difficulty walking  R26.2       5. Difficulty walking up stairs  R26.2       6. Difficulty walking down stairs  R26.2          Medical Diagnosis:  Post-op pain [G89.18] L knee   Referring Physician: Remi Bob MD  PCP:

## 2025-06-02 ENCOUNTER — OFFICE VISIT (OUTPATIENT)
Dept: ORTHOPEDIC SURGERY | Age: 50
End: 2025-06-02

## 2025-06-02 VITALS — BODY MASS INDEX: 33.13 KG/M2 | WEIGHT: 250 LBS | HEIGHT: 73 IN

## 2025-06-02 DIAGNOSIS — G89.18 POST-OP PAIN: Primary | ICD-10-CM

## 2025-06-02 PROCEDURE — 99024 POSTOP FOLLOW-UP VISIT: CPT | Performed by: ORTHOPAEDIC SURGERY

## 2025-06-04 ENCOUNTER — HOSPITAL ENCOUNTER (OUTPATIENT)
Dept: PHYSICAL THERAPY | Age: 50
Setting detail: THERAPIES SERIES
Discharge: HOME | End: 2025-06-04
Attending: ORTHOPAEDIC SURGERY
Payer: COMMERCIAL

## 2025-06-04 PROCEDURE — 97110 THERAPEUTIC EXERCISES: CPT | Performed by: PHYSICAL THERAPIST

## 2025-06-04 NOTE — PROGRESS NOTES
6/2/2025     Reason for visit:  Status post left knee revision quadriceps tendon repair with hamstring autograft augmentation and external fixation on 3/14/2025  Status post removal of left lower extremity Ex-Fix with I&D pin sites on 3/28/2025    History of Present Illness:  Patient returns for postoperative evaluation.  He is doing well.  He is progressing with physical therapy.  No fever or chills.  No numbness or tingling.    Objective:  Ht 1.854 m (6' 1\")   Wt 113.4 kg (250 lb)   BMI 32.98 kg/m²      Physical Exam:  The patient is well-appearing and in no apparent distress  Examination of the left knee   There is a trace effusion, no gross deformity or skin changes  Patient is able to perform and maintain a straight leg raise without extensor lag  Range of motion is 0 to 100 degrees  5 out of 5 strength throughout distal muscle groups  Sensation is intact to light touch throughout all distributions  There is no calf swelling or tenderness  Palpable DP pulse, brisk cap refill, 2+ symmetric reflexes     AP and lateral x-ray of the left knee was obtained the office today on 4/14/2025.  Postoperative changes present with no evidence of fracture or dislocation.    Assessment:  Status post left knee revision quadriceps tendon repair with hamstring autograft augmentation and external fixation on 3/14/2025  Status post removal of left lower extremity Ex-Fix with I&D pin sites on 3/28/2025    Plan:  Patient is doing well.  Continue physical therapy.  Return in 4 weeks.  At that time if he has any continued discomfort in the knee we can was consider cortisone injection.                 Remi Bob MD            Orthopaedic Surgery Sports Medicine and Arthroscopy            [facility]            Team Physician [facility] ([place])      Disclaimer:  This note was dictated with voice recognition software.  Though review and correction are routine, we apologize

## 2025-06-12 ENCOUNTER — HOSPITAL ENCOUNTER (OUTPATIENT)
Dept: PHYSICAL THERAPY | Age: 50
Setting detail: THERAPIES SERIES
Discharge: HOME | End: 2025-06-12
Attending: ORTHOPAEDIC SURGERY
Payer: COMMERCIAL

## 2025-06-12 PROCEDURE — 97110 THERAPEUTIC EXERCISES: CPT | Performed by: PHYSICAL THERAPIST

## 2025-06-12 NOTE — FLOWSHEET NOTE
efficiency.    Note: If patient does not return for scheduled/recommended follow up visits, this note will serve as a discharge from care along with the most recent update on progress.    Ortho Evaluation

## 2025-06-18 ENCOUNTER — APPOINTMENT (OUTPATIENT)
Dept: PHYSICAL THERAPY | Age: 50
End: 2025-06-18
Attending: ORTHOPAEDIC SURGERY
Payer: COMMERCIAL

## 2025-06-25 ENCOUNTER — APPOINTMENT (OUTPATIENT)
Dept: PHYSICAL THERAPY | Age: 50
End: 2025-06-25
Attending: ORTHOPAEDIC SURGERY
Payer: COMMERCIAL

## 2025-06-27 ENCOUNTER — HOSPITAL ENCOUNTER (OUTPATIENT)
Dept: PHYSICAL THERAPY | Age: 50
Setting detail: THERAPIES SERIES
Discharge: HOME | End: 2025-06-27
Attending: ORTHOPAEDIC SURGERY
Payer: COMMERCIAL

## 2025-06-27 NOTE — FLOWSHEET NOTE
Pt arrives to PT with complaints of increased knee pain and popping. Pt states that his knee is really bothering him today, at about 9:15am he stood up from his chair at work and hard intense pain at the medial aspect of his knee. He nearly fell back into his chair d/t the level of pain. Pt states that he has continued to have interimittent popping in this area since LPV but not bad; however, today the popping has been more prominent since 9:15am. Pt notes that he is definitely a bit nervous about the pain he is feeling today. Given location and extent of symptoms there is concern for retinaculum injury. Due to the fact that pt has limited insurance visits, opted to hold PT until after MD visit on Monday    Yoana Kingston PT, DPT, OCS  Physical Therapist  [id number]  [email or web address]

## 2025-06-30 ENCOUNTER — OFFICE VISIT (OUTPATIENT)
Dept: ORTHOPEDIC SURGERY | Age: 50
End: 2025-06-30
Payer: COMMERCIAL

## 2025-06-30 VITALS — WEIGHT: 250 LBS | BODY MASS INDEX: 33.13 KG/M2 | HEIGHT: 73 IN

## 2025-06-30 DIAGNOSIS — G89.18 POST-OP PAIN: Primary | ICD-10-CM

## 2025-06-30 PROCEDURE — 99213 OFFICE O/P EST LOW 20 MIN: CPT | Performed by: ORTHOPAEDIC SURGERY

## 2025-07-01 NOTE — PROGRESS NOTES
6/30/2025     Reason for visit:  Status post left knee revision quadriceps tendon repair with hamstring autograft augmentation and external fixation on 3/14/2025  Status post removal of left lower extremity Ex-Fix with I&D pin sites on 3/28/2025    History of Present Illness:  Patient returns for postoperative evaluation.  He is doing well.  He is progressing with physical therapy.  No fever or chills.  No numbness or tingling.    Objective:  Ht 1.854 m (6' 1\")   Wt 113.4 kg (250 lb)   BMI 32.98 kg/m²      Physical Exam:  The patient is well-appearing and in no apparent distress  Examination of the left knee   There is a trace effusion, no gross deformity or skin changes  Patient is able to perform and maintain a straight leg raise without extensor lag  Range of motion is 0 to 115 degrees  5 out of 5 strength throughout distal muscle groups  Sensation is intact to light touch throughout all distributions  There is no calf swelling or tenderness  Palpable DP pulse, brisk cap refill, 2+ symmetric reflexes     AP and lateral x-ray of the left knee was obtained the office today on 4/14/2025.  Postoperative changes present with no evidence of fracture or dislocation.    Assessment:  Status post left knee revision quadriceps tendon repair with hamstring autograft augmentation and external fixation on 3/14/2025  Status post removal of left lower extremity Ex-Fix with I&D pin sites on 3/28/2025    Plan:  Patient is doing well.  Continue physical therapy.  Return in 6 weeks.  At that time if he has any continued discomfort in the knee we can was consider cortisone injection.    Greater than 20 minutes were spent with this encounter.  Time spent included evaluating the patient's chart prior to arrival.  Evaluating the patient in the office including history, physical examination, imaging reviewing, and counseling on next steps.  Lastly, time was spent discussing orders with my staff as well as providing documentation in

## 2025-07-10 NOTE — PROGRESS NOTES
Preop instructions were sent to the patient via [employer]    Recommendations  1. Continue regular diet.   2. Continue Curt BID and Boost Plus BID.   3. Please continue documenting meal intake; document intake of all ONS consumed daily (4 total).   4. RD to follow.    Goal #1: Patient will meet > 80% EEN/EPN prior to RD follow up.  Nutrition Goal Status #1: goal met    Goal #2: Meal consumption >/= 50% by RD follow up.  Nutrition Goal Status #2: goal new

## 2025-08-18 ENCOUNTER — OFFICE VISIT (OUTPATIENT)
Dept: ORTHOPEDIC SURGERY | Age: 50
End: 2025-08-18
Payer: COMMERCIAL

## 2025-08-18 VITALS — BODY MASS INDEX: 33.13 KG/M2 | WEIGHT: 250 LBS | HEIGHT: 73 IN

## 2025-08-18 DIAGNOSIS — G89.18 POST-OP PAIN: Primary | ICD-10-CM

## 2025-08-18 PROCEDURE — 99213 OFFICE O/P EST LOW 20 MIN: CPT | Performed by: ORTHOPAEDIC SURGERY

## (undated) DEVICE — STOCKINETTE,IMPERVIOUS,12X48,STERILE: Brand: MEDLINE

## (undated) DEVICE — POST EXT FIX DIA11MM 30DEG OUTRIG MAG RESONANCE CONDITIONAL

## (undated) DEVICE — DRAPE,EENT,SPLIT,STERILE: Brand: MEDLINE

## (undated) DEVICE — BANDAGE COMPR W6INXL12FT SMOOTH FOR LIMB EXSANG ESMARCH

## (undated) DEVICE — SOLUTION IRRIG 1000ML 0.9% SOD CHL USP POUR PLAS BTL

## (undated) DEVICE — SYRINGE IRRIG 60ML SFT PLIABLE BLB EZ TO GRP 1 HND USE W/

## (undated) DEVICE — SUTURE VICRYL + SZ 2-0 L27IN ABSRB CLR CT-1 1/2 CIR TAPERCUT VCP259H

## (undated) DEVICE — INTENDED TO AID IN THE PASSING OF SUTURES THROUGH BONE AND SOFT TISSUE DURING ORTHOPEDIC SURGERY: Brand: HOFFEE SUTURE RETRIEVER

## (undated) DEVICE — SCREW EXT FIX L150MM DIA5MM THRD L150MM S STL SELF DRL MR

## (undated) DEVICE — BANDAGE COMPR W4INXL10YD WHITE/BEIGE E MTRX HK LOOP CLSR

## (undated) DEVICE — SUTURE VICRYL + SZ 2-0 L18IN ABSRB UD CT1 L36MM 1/2 CIR VCP839D

## (undated) DEVICE — APPLICATOR MEDICATED 26 CC SOLUTION HI LT ORNG CHLORAPREP

## (undated) DEVICE — PAD,NON-ADHERENT,3X8,STERILE,LF,1/PK: Brand: MEDLINE

## (undated) DEVICE — GLOVE SURG SZ 85 L12IN FNGR ORTHO 126MIL CRM LTX FREE

## (undated) DEVICE — 3M™ COBAN™ NL STERILE NON-LATEX SELF-ADHERENT WRAP, 2086S, 6 IN X 5 YD (15 CM X 4,5 M), 12 ROLLS/CASE: Brand: 3M™ COBAN™

## (undated) DEVICE — SYRINGE MED 30ML STD CLR PLAS LUERLOCK TIP N CTRL DISP

## (undated) DEVICE — GOWN SIRUS NONREIN XL W/TWL: Brand: MEDLINE INDUSTRIES, INC.

## (undated) DEVICE — DRESSING,GAUZE,XEROFORM,CURAD,5"X9",ST: Brand: CURAD

## (undated) DEVICE — GLOVE SURG SZ 8 CRM LTX FREE POLYISOPRENE POLYMER BEAD ANTI

## (undated) DEVICE — HYPODERMIC SAFETY NEEDLE: Brand: MAGELLAN

## (undated) DEVICE — SUTURE FIBERWIRE SZ 2 W/ TAPERED NEEDLE BLUE L38IN NONABSORB BLU L26.5MM 1/2 CIRCLE AR7200

## (undated) DEVICE — TRANSFER SET 3": Brand: MEDLINE INDUSTRIES, INC.

## (undated) DEVICE — BANDAGE COMPR W6INXL10YD ST M E WHITE/BEIGE

## (undated) DEVICE — GLOVE SURG SZ 85 L12IN FNGR THK79MIL GRN LTX FREE

## (undated) DEVICE — GLOVE SURG SZ 65 THK91MIL LTX FREE SYN POLYISOPRENE

## (undated) DEVICE — INTENDED FOR TISSUE SEPARATION, AND OTHER PROCEDURES THAT REQUIRE A SHARP SURGICAL BLADE TO PUNCTURE OR CUT.: Brand: BARD-PARKER ® STAINLESS STEEL BLADES

## (undated) DEVICE — ADHESIVE SKIN CLOSURE WND 8.661X1.5 IN 22 CM LIQUIBAND SECUR

## (undated) DEVICE — SPONGE LAP W18XL18IN WHT COT 4 PLY FLD STRUNG RADPQ DISP ST 2 PER PACK

## (undated) DEVICE — GOWN SIRUS NONREIN LG W/TWL: Brand: MEDLINE INDUSTRIES, INC.

## (undated) DEVICE — GLOVE SURG SZ 65 L12IN FNGR THK79MIL GRN LTX FREE

## (undated) DEVICE — 3M™ STERI-DRAPE™ U-DRAPE 1015: Brand: STERI-DRAPE™

## (undated) DEVICE — PADDING CAST W6INXL4YD COT LO LINTING WYTEX

## (undated) DEVICE — 4-PORT MANIFOLD: Brand: NEPTUNE 2

## (undated) DEVICE — GLOVE SURG SZ 6 THK91MIL LTX FREE SYN POLYISOPRENE ANTI

## (undated) DEVICE — DRAPE,U/SHT,SPLIT,FILM,60X84,STERILE: Brand: MEDLINE

## (undated) DEVICE — SCREW EXT FIX L175MM DIA5MM THRD L60MM S STL SELF DRL SCHNZ

## (undated) DEVICE — SUTURE VICRYL + SZ 1 L36IN ABSRB UD L36MM CT-1 1/2 CIR VCP947H

## (undated) DEVICE — SUTURE ETHIBOND EXCEL SZ 2 L30IN NONABSORBABLE GRN L40MM V-37 MX69G

## (undated) DEVICE — PADDING,UNDERCAST,COTTON, 4"X4YD STERILE: Brand: MEDLINE

## (undated) DEVICE — 3M™ IOBAN™ 2 ANTIMICROBIAL INCISE DRAPE 6650EZ: Brand: IOBAN™ 2

## (undated) DEVICE — BANDAGE,GAUZE,BULKEE II,4.5"X4.1YD,STRL: Brand: MEDLINE

## (undated) DEVICE — SUTURE VICRYL + 1 L27IN ABSRB UD CT-1 L36MM 1/2 CIR TAPR PNT VCP261H

## (undated) DEVICE — C-ARMOR C-ARM EQUIPMENT COVERS CLEAR STERILE UNIVERSAL FIT 12 PER CASE: Brand: C-ARMOR

## (undated) DEVICE — MASTISOL ADHESIVE LIQ 2/3ML

## (undated) DEVICE — HYPODERMIC SAFETY NEEDLE: Brand: MONOJECT

## (undated) DEVICE — 3D ISLAND DRESSING 4IN X 8IN: Brand: THERABOND 3D ANTIMICROBIAL BARRIER SYSTEMS

## (undated) DEVICE — MARKER,SKIN,WI/RULER AND LABELS: Brand: MEDLINE

## (undated) DEVICE — DRAPE,REIN 53X77,STERILE: Brand: MEDLINE

## (undated) DEVICE — TOWEL,OR,DSP,ST,BLUE,STD,4/PK,20PK/CS: Brand: MEDLINE

## (undated) DEVICE — PAD,ABDOMINAL,8"X7.5",STERILE,LF,1/PK: Brand: MEDLINE

## (undated) DEVICE — COVER LT HNDL BLU PLAS

## (undated) DEVICE — DRAPE C ARM W/ POLY STRP W42XL72IN FOR MOB XR

## (undated) DEVICE — CLAMP EXT FIX L PIN 6 POS MAG RESONANCE CONDITIONAL

## (undated) DEVICE — ELECTRODE PT RET AD L9FT HI MOIST COND ADH HYDRGEL CORDED

## (undated) DEVICE — T-DRAPE,EXTREMITY,STERILE: Brand: MEDLINE

## (undated) DEVICE — GLOVE ORANGE PI 7 1/2   MSG9075

## (undated) DEVICE — CLAMP EXT FIX L TI ALLY COMB CLP ON SELF HLD

## (undated) DEVICE — LOWER EXTREMITY: Brand: MEDLINE INDUSTRIES, INC.

## (undated) DEVICE — SPLINT KNEE UNIV FOR LESS THAN 36IN L24IN FOAM LAM ELAS CNTCT

## (undated) DEVICE — SOLUTION IRRIG 1000ML 09% SOD CHL USP PIC PLAS CONTAINER

## (undated) DEVICE — SUTURE VICRYL + SZ 1 L18IN ABSRB UD L36MM CT-1 1/2 CIR VCP841D

## (undated) DEVICE — GLOVE SURG SZ 75 L12IN FNGR THK79MIL GRN LTX FREE

## (undated) DEVICE — SUTURE FIBERWIRE SZ 5 L38IN NONABSORBABLE BLU L48MM 1/2 AR7211

## (undated) DEVICE — Device

## (undated) DEVICE — DRAPE,U/ SHT,SPLIT,PLAS,STERIL: Brand: MEDLINE

## (undated) DEVICE — 2.4 MM X 15 INCH DRILL TIP PASSING                                    PIN, STERILE: Brand: ENDOBUTTON

## (undated) DEVICE — SHEET,DRAPE,53X77,STERILE: Brand: MEDLINE

## (undated) DEVICE — YANKAUER,BULB TIP,W/O VENT,RIGID,STERILE: Brand: MEDLINE

## (undated) DEVICE — HANDPIECE SET WITH HIGH FLOW TIP AND SUCTION TUBE: Brand: INTERPULSE

## (undated) DEVICE — SPONGE GZ W4XL4IN COT 12 PLY TYP VII WVN C FLD DSGN STERILE

## (undated) DEVICE — HEWSON SUTURE RETRIEVER: Brand: HEWSON SUTURE RETRIEVER

## (undated) DEVICE — STAPLER SKIN H3.9MM WIRE DIA0.58MM CRWN 6.9MM 35 STPL ROT

## (undated) DEVICE — PAD ABSRB W8XL10IN ABD HYDROPHOBIC NONWOVEN THCK LAYR CELOS

## (undated) DEVICE — COVER,MAYO STAND,STERILE: Brand: MEDLINE

## (undated) DEVICE — MEDICINE CUP, GRADUATED, STER: Brand: MEDLINE

## (undated) DEVICE — MERCY HEALTH WEST TURNOVER: Brand: MEDLINE INDUSTRIES, INC.

## (undated) DEVICE — BLADE ES L6IN ELASTOMERIC COAT INSUL DURABLE BEND UPTO

## (undated) DEVICE — MAJOR SET UP: Brand: MEDLINE INDUSTRIES, INC.

## (undated) DEVICE — BIT DRL L195MM DIA3.5MM QUIK CPL FOR PELV INSTR SET PRO-PAK